# Patient Record
Sex: FEMALE | Race: WHITE | NOT HISPANIC OR LATINO | Employment: OTHER | RURAL
[De-identification: names, ages, dates, MRNs, and addresses within clinical notes are randomized per-mention and may not be internally consistent; named-entity substitution may affect disease eponyms.]

---

## 2017-02-08 ENCOUNTER — TELEPHONE (OUTPATIENT)
Dept: FAMILY MEDICINE CLINIC | Facility: CLINIC | Age: 82
End: 2017-02-08

## 2017-02-08 RX ORDER — ONDANSETRON 4 MG/1
4 TABLET, FILM COATED ORAL EVERY 6 HOURS PRN
Qty: 10 TABLET | Refills: 0 | Status: SHIPPED | OUTPATIENT
Start: 2017-02-08 | End: 2018-01-01

## 2017-02-08 NOTE — TELEPHONE ENCOUNTER
"Per Daughter \"she is throwing up and needs some zofran\"  Verbal per Dr Paniagua ok, but if she needs to be seen to let us know? Spoke with daughter and thinks is just a virus, advised if continues to call for appt, dtr stated understanding  "

## 2017-02-09 DIAGNOSIS — E11.9 TYPE 2 DIABETES MELLITUS WITHOUT COMPLICATION (HCC): ICD-10-CM

## 2017-02-09 RX ORDER — SITAGLIPTIN 100 MG/1
TABLET, FILM COATED ORAL
Qty: 90 TABLET | Refills: 1 | Status: SHIPPED | OUTPATIENT
Start: 2017-02-09 | End: 2017-07-05 | Stop reason: SDUPTHER

## 2017-02-09 RX ORDER — RANITIDINE 150 MG/1
TABLET ORAL
Qty: 180 TABLET | Refills: 1 | Status: SHIPPED | OUTPATIENT
Start: 2017-02-09 | End: 2017-07-05 | Stop reason: SDUPTHER

## 2017-02-09 RX ORDER — AMLODIPINE BESYLATE 5 MG/1
TABLET ORAL
Qty: 180 TABLET | Refills: 1 | Status: SHIPPED | OUTPATIENT
Start: 2017-02-09 | End: 2017-07-05 | Stop reason: SDUPTHER

## 2017-02-09 RX ORDER — METFORMIN HYDROCHLORIDE 500 MG/1
TABLET, EXTENDED RELEASE ORAL
Qty: 180 TABLET | Refills: 1 | Status: SHIPPED | OUTPATIENT
Start: 2017-02-09 | End: 2017-07-05 | Stop reason: SDUPTHER

## 2017-02-09 RX ORDER — POTASSIUM CHLORIDE 750 MG/1
CAPSULE, EXTENDED RELEASE ORAL
Qty: 90 CAPSULE | Refills: 1 | Status: SHIPPED | OUTPATIENT
Start: 2017-02-09 | End: 2017-07-05 | Stop reason: SDUPTHER

## 2017-02-09 RX ORDER — DULOXETIN HYDROCHLORIDE 30 MG/1
CAPSULE, DELAYED RELEASE ORAL
Qty: 90 CAPSULE | Refills: 1 | Status: SHIPPED | OUTPATIENT
Start: 2017-02-09 | End: 2017-07-05 | Stop reason: SDUPTHER

## 2017-02-09 RX ORDER — ATORVASTATIN CALCIUM 10 MG/1
TABLET, FILM COATED ORAL
Qty: 90 TABLET | Refills: 1 | Status: SHIPPED | OUTPATIENT
Start: 2017-02-09 | End: 2017-07-05 | Stop reason: SDUPTHER

## 2017-02-09 RX ORDER — GLIMEPIRIDE 4 MG/1
TABLET ORAL
Qty: 90 TABLET | Refills: 1 | Status: SHIPPED | OUTPATIENT
Start: 2017-02-09 | End: 2017-07-05 | Stop reason: SDUPTHER

## 2017-02-09 RX ORDER — PROPRANOLOL HYDROCHLORIDE 40 MG/1
TABLET ORAL
Qty: 270 TABLET | Refills: 1 | Status: SHIPPED | OUTPATIENT
Start: 2017-02-09 | End: 2017-07-05 | Stop reason: SDUPTHER

## 2017-02-09 RX ORDER — LOSARTAN POTASSIUM 100 MG/1
TABLET ORAL
Qty: 90 TABLET | Refills: 1 | Status: SHIPPED | OUTPATIENT
Start: 2017-02-09 | End: 2017-07-05 | Stop reason: SDUPTHER

## 2017-02-09 RX ORDER — FUROSEMIDE 20 MG/1
TABLET ORAL
Qty: 90 TABLET | Refills: 1 | Status: SHIPPED | OUTPATIENT
Start: 2017-02-09 | End: 2017-07-05 | Stop reason: SDUPTHER

## 2017-02-14 ENCOUNTER — LAB (OUTPATIENT)
Dept: FAMILY MEDICINE CLINIC | Facility: CLINIC | Age: 82
End: 2017-02-14

## 2017-02-14 DIAGNOSIS — I10 ESSENTIAL HYPERTENSION: ICD-10-CM

## 2017-02-14 DIAGNOSIS — E11.9 TYPE 2 DIABETES MELLITUS WITHOUT COMPLICATION, UNSPECIFIED LONG TERM INSULIN USE STATUS: Primary | ICD-10-CM

## 2017-02-14 DIAGNOSIS — E55.9 VITAMIN D DEFICIENCY DISEASE: ICD-10-CM

## 2017-02-14 DIAGNOSIS — E78.5 HYPERLIPIDEMIA, UNSPECIFIED HYPERLIPIDEMIA TYPE: ICD-10-CM

## 2017-02-15 LAB
ALBUMIN SERPL-MCNC: 3.5 G/DL (ref 3.5–5)
ALBUMIN/GLOB SERPL: 1.3 G/DL (ref 1.1–2.5)
ALP SERPL-CCNC: 62 U/L (ref 24–120)
ALT SERPL-CCNC: 36 U/L (ref 0–54)
AST SERPL-CCNC: 22 U/L (ref 7–45)
BASOPHILS # BLD AUTO: 0.02 10*3/MM3 (ref 0–0.2)
BASOPHILS NFR BLD AUTO: 0.2 % (ref 0–2)
BILIRUB SERPL-MCNC: 0.5 MG/DL (ref 0.1–1)
BUN SERPL-MCNC: 14 MG/DL (ref 5–21)
BUN/CREAT SERPL: 22.2 (ref 7–25)
CALCIUM SERPL-MCNC: 9.6 MG/DL (ref 8.4–10.4)
CHLORIDE SERPL-SCNC: 99 MMOL/L (ref 98–110)
CHOLEST SERPL-MCNC: 99 MG/DL (ref 130–200)
CO2 SERPL-SCNC: 30 MMOL/L (ref 24–31)
CREAT SERPL-MCNC: 0.63 MG/DL (ref 0.5–1.4)
EOSINOPHIL # BLD AUTO: 0.15 10*3/MM3 (ref 0–0.7)
EOSINOPHIL NFR BLD AUTO: 1.7 % (ref 0–4)
ERYTHROCYTE [DISTWIDTH] IN BLOOD BY AUTOMATED COUNT: 15 % (ref 12–15)
GLOBULIN SER CALC-MCNC: 2.8 GM/DL
GLUCOSE SERPL-MCNC: 164 MG/DL (ref 70–100)
HBA1C MFR BLD: 6.9 %
HCT VFR BLD AUTO: 40.8 % (ref 37–47)
HDLC SERPL-MCNC: 41 MG/DL
HGB BLD-MCNC: 12.5 G/DL (ref 12–16)
IMM GRANULOCYTES # BLD: 0.03 10*3/MM3 (ref 0–0.03)
IMM GRANULOCYTES NFR BLD: 0.3 % (ref 0–5)
LDLC SERPL CALC-MCNC: 39 MG/DL (ref 0–99)
LYMPHOCYTES # BLD AUTO: 0.9 10*3/MM3 (ref 0.72–4.86)
LYMPHOCYTES NFR BLD AUTO: 10.1 % (ref 15–45)
MCH RBC QN AUTO: 28.6 PG (ref 28–32)
MCHC RBC AUTO-ENTMCNC: 30.6 G/DL (ref 33–36)
MCV RBC AUTO: 93.4 FL (ref 82–98)
MICROALBUMIN UR-MCNC: 23.1 UG/ML
MONOCYTES # BLD AUTO: 0.75 10*3/MM3 (ref 0.19–1.3)
MONOCYTES NFR BLD AUTO: 8.4 % (ref 4–12)
NEUTROPHILS # BLD AUTO: 7.06 10*3/MM3 (ref 1.87–8.4)
NEUTROPHILS NFR BLD AUTO: 79.3 % (ref 39–78)
PLATELET # BLD AUTO: 260 10*3/MM3 (ref 130–400)
POTASSIUM SERPL-SCNC: 4.3 MMOL/L (ref 3.5–5.3)
PROT SERPL-MCNC: 6.3 G/DL (ref 6.3–8.7)
RBC # BLD AUTO: 4.37 10*6/MM3 (ref 4.2–5.4)
SODIUM SERPL-SCNC: 139 MMOL/L (ref 135–145)
T4 SERPL-MCNC: 8.3 UG/DL (ref 4.5–12)
TRIGL SERPL-MCNC: 95 MG/DL (ref 0–149)
TSH SERPL DL<=0.005 MIU/L-ACNC: 1.38 MIU/ML (ref 0.47–4.68)
VLDLC SERPL CALC-MCNC: 19 MG/DL
WBC # BLD AUTO: 8.91 10*3/MM3 (ref 4.8–10.8)

## 2017-02-16 PROBLEM — D64.9 ANEMIA: Status: ACTIVE | Noted: 2017-02-16

## 2017-02-16 PROBLEM — H40.9 GLAUCOMA: Chronic | Status: ACTIVE | Noted: 2017-02-16

## 2017-02-16 PROBLEM — R09.02 HYPOXEMIA: Status: ACTIVE | Noted: 2017-02-16

## 2017-02-16 PROBLEM — E78.5 HYPERLIPIDEMIA: Chronic | Status: ACTIVE | Noted: 2017-02-16

## 2017-02-16 PROBLEM — Z00.00 WELLNESS EXAMINATION: Status: ACTIVE | Noted: 2017-02-16

## 2017-02-16 PROBLEM — H61.20 EXCESSIVE CERUMEN IN EAR CANAL: Status: ACTIVE | Noted: 2017-02-16

## 2017-02-16 PROBLEM — H35.30 MACULAR DEGENERATION: Chronic | Status: ACTIVE | Noted: 2017-02-16

## 2017-02-16 PROBLEM — I10 HYPERTENSION: Chronic | Status: ACTIVE | Noted: 2017-02-16

## 2017-02-16 PROBLEM — F32.A DEPRESSION: Chronic | Status: ACTIVE | Noted: 2017-02-16

## 2017-02-16 PROBLEM — E11.9 DIABETES TYPE 2, CONTROLLED (HCC): Chronic | Status: ACTIVE | Noted: 2017-02-16

## 2017-02-16 PROBLEM — R25.1 TREMOR: Status: ACTIVE | Noted: 2017-02-16

## 2017-02-17 ENCOUNTER — OFFICE VISIT (OUTPATIENT)
Dept: FAMILY MEDICINE CLINIC | Facility: CLINIC | Age: 82
End: 2017-02-17

## 2017-02-17 VITALS
RESPIRATION RATE: 16 BRPM | HEART RATE: 84 BPM | OXYGEN SATURATION: 98 % | SYSTOLIC BLOOD PRESSURE: 128 MMHG | DIASTOLIC BLOOD PRESSURE: 68 MMHG | WEIGHT: 137 LBS | TEMPERATURE: 98.1 F | HEIGHT: 65 IN | BODY MASS INDEX: 22.82 KG/M2

## 2017-02-17 DIAGNOSIS — I10 ESSENTIAL HYPERTENSION: Primary | Chronic | ICD-10-CM

## 2017-02-17 DIAGNOSIS — E11.9 CONTROLLED TYPE 2 DIABETES MELLITUS WITHOUT COMPLICATION, WITHOUT LONG-TERM CURRENT USE OF INSULIN (HCC): Chronic | ICD-10-CM

## 2017-02-17 DIAGNOSIS — R26.9 GAIT DIFFICULTY: ICD-10-CM

## 2017-02-17 DIAGNOSIS — E78.5 HYPERLIPIDEMIA, UNSPECIFIED HYPERLIPIDEMIA TYPE: Chronic | ICD-10-CM

## 2017-02-17 DIAGNOSIS — D64.9 ANEMIA, UNSPECIFIED TYPE: ICD-10-CM

## 2017-02-17 DIAGNOSIS — R09.02 HYPOXEMIA: ICD-10-CM

## 2017-02-17 PROBLEM — I49.9 CARDIAC ARRHYTHMIA: Status: ACTIVE | Noted: 2017-02-17

## 2017-02-17 PROCEDURE — 99213 OFFICE O/P EST LOW 20 MIN: CPT | Performed by: FAMILY MEDICINE

## 2017-02-17 RX ORDER — DOCUSATE SODIUM 100 MG/1
100 CAPSULE, LIQUID FILLED ORAL 2 TIMES DAILY PRN
COMMUNITY

## 2017-02-17 RX ORDER — ASPIRIN 81 MG/1
81 TABLET ORAL DAILY
COMMUNITY
End: 2019-01-01 | Stop reason: ALTCHOICE

## 2017-02-17 NOTE — PROGRESS NOTES
Subjective   Kortney Henson is a 89 y.o. female presenting with chief complaint of:   Chief Complaint   Patient presents with   • Diabetes   • Hyperlipidemia   • Hypertension   • Anemia       History of Present Illness :  With daughter.  Has multiple chronic problems; interval appointment.  One of these problems is HTN.       The HTN has been present for years/it is chronic.  The HTN is assumed essential/without testing needed to look for other.  The HTN is controlled manifest by todays blood pressure and no home monitoring.   Other chronic problem/s to consider:  Anemia: This has been present for years/over a year.  It is chronic.  These has been GI evaulation in the past. There is no current melena, hematochezia. It has been bening to date and stable.   DM2: This has been present for years/over a year.  It is chronic.  It is controlled.  Home accuchecks rarely run.  No hypoglycemia manifest as syncope/near syncope or diaphoretic/sweating episodes.  A1c below.   Gait decline: This has been present for years/over a year.  It is chronic.  It is influenced by strength, balance, minor joint pain.   Hyperlipidemia: This has been present for years/over a year.  It is chronic.   It is controlled.   Labs are done regularly and prove control  Nocturnal hypoxemia:  Was discovered with her hip fracture/over a year and chronic. She wears her oxygen at night.  She denies SOB night/day.  She does not want a sleep study.     The following portions of the patient's history were reviewed and updated as appropriate: allergies, current medications, past family history, past medical history, past social history, past surgical history and problem list.  TCC migrated if needed/reviewed.      Current Outpatient Prescriptions:   •  amLODIPine (NORVASC) 5 MG tablet, TAKE ONE TABLET TWICE DAILY GENERIC FOR NORVASC, Disp: 180 tablet, Rfl: 1  •  aspirin 81 MG EC tablet, Take 81 mg by mouth Daily., Disp: , Rfl:   •  atorvastatin (LIPITOR) 10 MG  tablet, TAKE ONE TABLET AT BEDTIME GENERIC FOR LIPITOR, Disp: 90 tablet, Rfl: 1  •  Calcium Carb-Cholecalciferol (CALCIUM-VITAMIN D) 600-400 MG-UNIT tablet, Take  by mouth 2 (Two) Times a Day., Disp: , Rfl:   •  docusate sodium (COLACE) 100 MG capsule, Take 100 mg by mouth 2 (Two) Times a Day As Needed for constipation., Disp: , Rfl:   •  DULoxetine (CYMBALTA) 30 MG capsule, TAKE 1 CAPSULE DAILY GENERIC FOR CYMBALTA, Disp: 90 capsule, Rfl: 1  •  furosemide (LASIX) 20 MG tablet, TAKE 1 TABLET DAILY GENERIC FOR LASIX, Disp: 90 tablet, Rfl: 1  •  glimepiride (AMARYL) 4 MG tablet, TAKE ONE TABLET EVERY MORNING GENERIC FOR AMARYL, Disp: 90 tablet, Rfl: 1  •  glucose blood (COOL BLOOD GLUCOSE TEST STRIPS) test strip, USE AS DIRECTED DAILY, Disp: 100 each, Rfl: 5  •  JANUVIA 100 MG tablet, TAKE ONE TABLET DAILY, Disp: 90 tablet, Rfl: 1  •  losartan (COZAAR) 100 MG tablet, TAKE ONE TABLET DAILY GENERIC FOR COZAAR, Disp: 90 tablet, Rfl: 1  •  metFORMIN ER (GLUCOPHAGE-XR) 500 MG 24 hr tablet, TAKE  2 TABLETS DAILY WITH EVENING MEAL GENERIC FOR GLUCOPHAGE ER( DOSE IN  REHAB CENTER), Disp: 180 tablet, Rfl: 1  •  Multiple Vitamins-Minerals (WOMENS MULTI VITAMIN & MINERAL) tablet, Take  by mouth Daily., Disp: , Rfl:   •  potassium chloride (MICRO-K) 10 MEQ CR capsule, TAKE 1 CAPSULE DAILY (WITH LASIX), Disp: 90 capsule, Rfl: 1  •  propranolol (INDERAL) 40 MG tablet, TAKE ONE TABLET THREE TIMES DAILY GENERIC FOR INDERAL, Disp: 270 tablet, Rfl: 1  •  raNITIdine (ZANTAC) 150 MG tablet, TAKE ONE TABLET TWICE DAILY GENERIC FOR ZANTAC, Disp: 180 tablet, Rfl: 1  •  teriparatide (FORTEO) 600 MCG/2.4ML injection, Inject 20 mcg under the skin Daily. Dr Magana, Disp: , Rfl:   •  ondansetron (ZOFRAN) 4 MG tablet, Take 1 tablet by mouth Every 6 (Six) Hours As Needed for nausea or vomiting., Disp: 10 tablet, Rfl: 0    Allergies   Allergen Reactions   • Penicillins Swelling and Rash       Review of Systems  GENERAL:  Inactive/slower with  limits, speed, samni for age and balance.. Sleep is ok with oxygen and denies apnea. No fever.  ENDO:  No syncope, near or diaphoretic sweaty spells.  HEENT: No head injury  headache,  No vision change, No hearing loss.  Ears without pain/drainage.  No sore throat.  No nasal/sinus congestion/drainage. No epistaxis.  CHEST: No chest wall tenderness or mass. No cough,  without wheeze, SOB; no hemoptysis.  CV: No chest pain, palpatations, ankle edema.  GI: No heartburn, dysphagia.  No abdominal pain, diarrhea, constipation, rectal bleeding, or melena.    :  Voids without dysuria, or incontience to completion.  ORTHO: No painful/swollen joints but various on /off sore.  No  sore neck or back.  No acute neck or back pain without recent injury.   NEURO: No dizziness, weakness of extremities.  No numbness/parethesias.   PSYCH: No memory loss.  Mood good; not anxious, depressed but/and not suicidal.  Tolerated stress.     Results for orders placed or performed in visit on 02/14/17   Comprehensive metabolic panel   Result Value Ref Range    Glucose 164 (H) 70 - 100 mg/dL    BUN 14 5 - 21 mg/dL    Creatinine 0.63 0.50 - 1.40 mg/dL    eGFR Non African Am 89 >60 mL/min/1.73    eGFR African Am 108 >60 mL/min/1.73    BUN/Creatinine Ratio 22.2 7.0 - 25.0    Sodium 139 135 - 145 mmol/L    Potassium 4.3 3.5 - 5.3 mmol/L    Chloride 99 98 - 110 mmol/L    Total CO2 30.0 24.0 - 31.0 mmol/L    Calcium 9.6 8.4 - 10.4 mg/dL    Total Protein 6.3 6.3 - 8.7 g/dL    Albumin 3.50 3.50 - 5.00 g/dL    Globulin 2.8 gm/dL    A/G Ratio 1.3 1.1 - 2.5 g/dL    Total Bilirubin 0.5 0.1 - 1.0 mg/dL    Alkaline Phosphatase 62 24 - 120 U/L    AST (SGOT) 22 7 - 45 U/L    ALT (SGPT) 36 0 - 54 U/L   Lipid Panel   Result Value Ref Range    Total Cholesterol 99 (L) 130 - 200 mg/dL    Triglycerides 95 0 - 149 mg/dL    HDL Cholesterol 41 (L) >=50 mg/dL    VLDL Cholesterol 19 mg/dL    LDL Cholesterol  39 0 - 99 mg/dL   TSH   Result Value Ref Range    TSH 1.380  0.470 - 4.680 mIU/mL   T4   Result Value Ref Range    T4, Total 8.3 4.5 - 12.0 ug/dL   Hemoglobin A1c   Result Value Ref Range    Hemoglobin A1C 6.90 %   MicroAlbumin, Urine, Random   Result Value Ref Range    Microalbumin, Urine 23.1 Not Estab. ug/mL   CBC & Differential   Result Value Ref Range    WBC 8.91 4.80 - 10.80 10*3/mm3    RBC 4.37 4.20 - 5.40 10*6/mm3    Hemoglobin 12.5 12.0 - 16.0 g/dL    Hematocrit 40.8 37.0 - 47.0 %    MCV 93.4 82.0 - 98.0 fL    MCH 28.6 28.0 - 32.0 pg    MCHC 30.6 (L) 33.0 - 36.0 g/dL    RDW 15.0 12.0 - 15.0 %    Platelets 260 130 - 400 10*3/mm3    Neutrophil Rel % 79.3 (H) 39.0 - 78.0 %    Lymphocyte Rel % 10.1 (L) 15.0 - 45.0 %    Monocyte Rel % 8.4 4.0 - 12.0 %    Eosinophil Rel % 1.7 0.0 - 4.0 %    Basophil Rel % 0.2 0.0 - 2.0 %    Neutrophils Absolute 7.06 1.87 - 8.40 10*3/mm3    Lymphocytes Absolute 0.90 0.72 - 4.86 10*3/mm3    Monocytes Absolute 0.75 0.19 - 1.30 10*3/mm3    Eosinophils Absolute 0.15 0.00 - 0.70 10*3/mm3    Basophils Absolute 0.02 0.00 - 0.20 10*3/mm3    Immature Granulocyte Rel % 0.3 0.0 - 5.0 %    Immature Grans Absolute 0.03 0.00 - 0.03 10*3/mm3       Lab Results   Component Value Date    HGBA1C 6.90 02/14/2017       Lab Results   Component Value Date     02/14/2017     02/21/2016     02/20/2016    K 4.3 02/14/2017    K 3.7 02/21/2016    K 3.6 02/20/2016    CL 99 02/14/2017     02/21/2016     02/20/2016    CO2 30.0 02/14/2017    CO2 22 (L) 02/21/2016    CO2 25 02/20/2016    GLUCOSE 131 (H) 02/21/2016    GLUCOSE 204 (H) 02/20/2016    BUN 14 02/14/2017    BUN 22 (H) 02/21/2016    BUN 24 (H) 02/20/2016    CREATININE 0.63 02/14/2017    CREATININE 0.54 02/21/2016    CREATININE 0.51 02/20/2016    CALCIUM 9.6 02/14/2017    CALCIUM 8.2 (L) 02/21/2016    CALCIUM 8.9 02/20/2016    EGFRCLEARA 107 02/21/2016    EGFRCLEARA 114 02/20/2016       No results found for: PSA     Objective   Visit Vitals   • /68 (BP Location: Left arm, Patient  "Position: Sitting, Cuff Size: Adult)   • Pulse 84   • Temp 98.1 °F (36.7 °C) (Oral)   • Resp 16   • Ht 64.5\" (163.8 cm)   • Wt 137 lb (62.1 kg)   • SpO2 98%   • BMI 23.15 kg/m2       Physical Exam  GENERAL:  Well nourished/developed in no acute distress. Thin  SKIN: Turgor excellent, without wound, rash, lesion.  HEENT: Normal cephalic without trauma.  Pupils equal round reactive to light. Extraocular motions full without nystagmus.   External canals nonobstructive nontender without reddness. Tymphatic membranes elza with cristina structures intact.   Oral cavity without growths, exudates, and moist.  Posterior pharnyx without mass, obstruction, reddness.  No thyroidmegaly, mass, tenderness, lymphadenopathy and supple.  CV: Regular rhythm.  No murmur, gallop,  edema. Posterior pulses intact.  No carotid bruits.  CHEST: No chest wall tenderness or mass.   LUNGS: Symmetric motion with clear to auscultation.   ABD: Soft, nontender without mass.   ORTHO: Symmetric extremities without swelling/point tenderness.  Full gross range of motion.    NEURO: CN 2-12 grossly intact.  Symmetric facies. 1/4 x knee  equal reflexes.  UE/LE   2-3/5 strength throughout.  Nonfocal use extremities. Speech clear.  Reduced light touch with monofilament, vibratory sensation with tuning fork; equal toes/distal feet.    PSYCH: Oriented x 3.  Pleasant calm, well kept.  Purposeful/directed conservation with intact short/long gross memory.     Assessment/Plan     1. Essential hypertension    2. Controlled type 2 diabetes mellitus without complication, without long-term current use of insulin    3. Hyperlipidemia, unspecified hyperlipidemia type    4. Anemia, unspecified type  In past    5. Gait difficulty  Mostly degenerative    6. Hypoxemia  nocturnal      Rx: reviewed.  Changes if above  LAB: reviewed/above, and if orders  Wrap-up/other instructions  Regular cardio exercise something everyone should consider and try to do; discussed as " able  Healthy diet helpful for weight management, illness prevention; discussed  If over 50-screening exams include men PSA/rectal exam, women mammograms, and  everyone colonoscopy screening for colon cancer.   There are no Patient Instructions on file for this visit.    Follow up: Return in about 6 months (around 8/17/2017).

## 2017-03-07 ENCOUNTER — OFFICE VISIT (OUTPATIENT)
Dept: FAMILY MEDICINE CLINIC | Facility: CLINIC | Age: 82
End: 2017-03-07

## 2017-03-07 VITALS
DIASTOLIC BLOOD PRESSURE: 66 MMHG | OXYGEN SATURATION: 96 % | TEMPERATURE: 98.2 F | SYSTOLIC BLOOD PRESSURE: 128 MMHG | BODY MASS INDEX: 22.99 KG/M2 | HEIGHT: 65 IN | RESPIRATION RATE: 18 BRPM | HEART RATE: 88 BPM | WEIGHT: 138 LBS

## 2017-03-07 DIAGNOSIS — R09.02 HYPOXEMIA: Primary | ICD-10-CM

## 2017-03-07 PROCEDURE — 99213 OFFICE O/P EST LOW 20 MIN: CPT | Performed by: FAMILY MEDICINE

## 2017-03-07 NOTE — PROGRESS NOTES
"Subjective   Kortney Henson is a 89 y.o. female presenting with chief complaint of:   Chief Complaint   Patient presents with   • oxygen therapy     face to face \"I use it at bedtime\"       History of Present Illness :  With daughter.  Has multiple chronic problems; interval appointment.  One of these problems is nocturnal hypoxemia.  Discovered while in hospital 2.2016 with hip fracture.  Treated with nocturnal oxygen.  Denies SOB at night.  Needs face to face to continue care.      Other chronic problem/s to consider:  HTN.  The HTN has been present for years/it is chronic.  The HTN is assumed essential/without testing needed to look for other.  The HTN is controlled manifest by todays blood pressure and same/occ home monitoring.   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  The arrthymia is primarily PAC/PVC benign on holter.   The rhythm is not associated with syncope/near syncope, dizziness, or weakness. Medications being used help.  DM2: This has been present for years/over a year.  It is chronic.  It is controlled.  Home accuchecks run fasting 100-120.   No hypoglycemia manifest as syncope/near syncope or diaphoretic/sweating episodes.  A1c below.   Hyperlipidemia: This has been present for years/over a year.  It is chronic.   It is controlled.   Labs are done regularly and prove contro    The following portions of the patient's history were reviewed and updated as appropriate: allergies, current medications, past family history, past medical history, past social history, past surgical history and problem list.  TCC migrated if needed/reviewed.      Current Outpatient Prescriptions:   •  amLODIPine (NORVASC) 5 MG tablet, TAKE ONE TABLET TWICE DAILY GENERIC FOR NORVASC, Disp: 180 tablet, Rfl: 1  •  aspirin 81 MG EC tablet, Take 81 mg by mouth Daily., Disp: , Rfl:   •  atorvastatin (LIPITOR) 10 MG tablet, TAKE ONE TABLET AT BEDTIME GENERIC FOR LIPITOR, Disp: 90 tablet, Rfl: " 1  •  Calcium Carb-Cholecalciferol (CALCIUM-VITAMIN D) 600-400 MG-UNIT tablet, Take  by mouth 2 (Two) Times a Day., Disp: , Rfl:   •  docusate sodium (COLACE) 100 MG capsule, Take 100 mg by mouth 2 (Two) Times a Day As Needed for constipation., Disp: , Rfl:   •  DULoxetine (CYMBALTA) 30 MG capsule, TAKE 1 CAPSULE DAILY GENERIC FOR CYMBALTA, Disp: 90 capsule, Rfl: 1  •  furosemide (LASIX) 20 MG tablet, TAKE 1 TABLET DAILY GENERIC FOR LASIX, Disp: 90 tablet, Rfl: 1  •  glimepiride (AMARYL) 4 MG tablet, TAKE ONE TABLET EVERY MORNING GENERIC FOR AMARYL, Disp: 90 tablet, Rfl: 1  •  glucose blood (COOL BLOOD GLUCOSE TEST STRIPS) test strip, USE AS DIRECTED DAILY, Disp: 100 each, Rfl: 5  •  JANUVIA 100 MG tablet, TAKE ONE TABLET DAILY, Disp: 90 tablet, Rfl: 1  •  losartan (COZAAR) 100 MG tablet, TAKE ONE TABLET DAILY GENERIC FOR COZAAR, Disp: 90 tablet, Rfl: 1  •  metFORMIN ER (GLUCOPHAGE-XR) 500 MG 24 hr tablet, TAKE  2 TABLETS DAILY WITH EVENING MEAL GENERIC FOR GLUCOPHAGE ER( DOSE IN  REHAB CENTER), Disp: 180 tablet, Rfl: 1  •  Multiple Vitamins-Minerals (WOMENS MULTI VITAMIN & MINERAL) tablet, Take  by mouth Daily., Disp: , Rfl:   •  O2 (OXYGEN), Inhale 2 L/min Every Night. Per nasal cannula, Disp: , Rfl:   •  ondansetron (ZOFRAN) 4 MG tablet, Take 1 tablet by mouth Every 6 (Six) Hours As Needed for nausea or vomiting., Disp: 10 tablet, Rfl: 0  •  potassium chloride (MICRO-K) 10 MEQ CR capsule, TAKE 1 CAPSULE DAILY (WITH LASIX), Disp: 90 capsule, Rfl: 1  •  propranolol (INDERAL) 40 MG tablet, TAKE ONE TABLET THREE TIMES DAILY GENERIC FOR INDERAL, Disp: 270 tablet, Rfl: 1  •  raNITIdine (ZANTAC) 150 MG tablet, TAKE ONE TABLET TWICE DAILY GENERIC FOR ZANTAC, Disp: 180 tablet, Rfl: 1  •  teriparatide (FORTEO) 600 MCG/2.4ML injection, Inject 20 mcg under the skin Daily. Dr Magana, Disp: , Rfl:     Allergies   Allergen Reactions   • Penicillins Swelling and Rash       Review of Systems  GENERAL:  Inactive/slower with  limits, speed, samni for age fatigue with exertion; desire. Sleep is ok.  ENDO:  No syncope, near or diaphoretic sweaty spells.  BS Ok: no download noted.  HEENT: No head injury or headache,  No vision change, Same mild hearing loss.  Ears without pain/drainage.  No sore throat.  No nasal/sinus congestion/drainage. No epistaxis.  CHEST: No chest wall tenderness or mass. No cough,  without wheeze,occ with exertional stable/mild SOB; no hemoptysis.  CV: No chest pain, palpatations, ankle edema.  GI: No heartburn, dysphagia.  No abdominal pain, diarrhea, constipation, rectal bleeding, or melena.    :  Voids without dysuria, or incontience to completion.  ORTHO: No painful/swollen joints but various on /off sore.  No change occ sore neck or back.  No acute neck or back pain without recent injury.   NEURO: No dizziness, weakness of extremities.  No change LE numbness/parethesias.   PSYCH: No memory loss.  Mood good; mild occ anxious, depressed but/and not suicidal.  Tolerated stress.     Results for orders placed or performed in visit on 02/14/17   Comprehensive metabolic panel   Result Value Ref Range    Glucose 164 (H) 70 - 100 mg/dL    BUN 14 5 - 21 mg/dL    Creatinine 0.63 0.50 - 1.40 mg/dL    eGFR Non African Am 89 >60 mL/min/1.73    eGFR African Am 108 >60 mL/min/1.73    BUN/Creatinine Ratio 22.2 7.0 - 25.0    Sodium 139 135 - 145 mmol/L    Potassium 4.3 3.5 - 5.3 mmol/L    Chloride 99 98 - 110 mmol/L    Total CO2 30.0 24.0 - 31.0 mmol/L    Calcium 9.6 8.4 - 10.4 mg/dL    Total Protein 6.3 6.3 - 8.7 g/dL    Albumin 3.50 3.50 - 5.00 g/dL    Globulin 2.8 gm/dL    A/G Ratio 1.3 1.1 - 2.5 g/dL    Total Bilirubin 0.5 0.1 - 1.0 mg/dL    Alkaline Phosphatase 62 24 - 120 U/L    AST (SGOT) 22 7 - 45 U/L    ALT (SGPT) 36 0 - 54 U/L   Lipid Panel   Result Value Ref Range    Total Cholesterol 99 (L) 130 - 200 mg/dL    Triglycerides 95 0 - 149 mg/dL    HDL Cholesterol 41 (L) >=50 mg/dL    VLDL Cholesterol 19 mg/dL    LDL  Cholesterol  39 0 - 99 mg/dL   TSH   Result Value Ref Range    TSH 1.380 0.470 - 4.680 mIU/mL   T4   Result Value Ref Range    T4, Total 8.3 4.5 - 12.0 ug/dL   Hemoglobin A1c   Result Value Ref Range    Hemoglobin A1C 6.90 %   MicroAlbumin, Urine, Random   Result Value Ref Range    Microalbumin, Urine 23.1 Not Estab. ug/mL   CBC & Differential   Result Value Ref Range    WBC 8.91 4.80 - 10.80 10*3/mm3    RBC 4.37 4.20 - 5.40 10*6/mm3    Hemoglobin 12.5 12.0 - 16.0 g/dL    Hematocrit 40.8 37.0 - 47.0 %    MCV 93.4 82.0 - 98.0 fL    MCH 28.6 28.0 - 32.0 pg    MCHC 30.6 (L) 33.0 - 36.0 g/dL    RDW 15.0 12.0 - 15.0 %    Platelets 260 130 - 400 10*3/mm3    Neutrophil Rel % 79.3 (H) 39.0 - 78.0 %    Lymphocyte Rel % 10.1 (L) 15.0 - 45.0 %    Monocyte Rel % 8.4 4.0 - 12.0 %    Eosinophil Rel % 1.7 0.0 - 4.0 %    Basophil Rel % 0.2 0.0 - 2.0 %    Neutrophils Absolute 7.06 1.87 - 8.40 10*3/mm3    Lymphocytes Absolute 0.90 0.72 - 4.86 10*3/mm3    Monocytes Absolute 0.75 0.19 - 1.30 10*3/mm3    Eosinophils Absolute 0.15 0.00 - 0.70 10*3/mm3    Basophils Absolute 0.02 0.00 - 0.20 10*3/mm3    Immature Granulocyte Rel % 0.3 0.0 - 5.0 %    Immature Grans Absolute 0.03 0.00 - 0.03 10*3/mm3       CBC:     BMP:      Lab Results   Component Value Date    HGBA1C 6.90 02/14/2017       Lab Results   Component Value Date     02/14/2017     02/21/2016     02/20/2016    K 4.3 02/14/2017    K 3.7 02/21/2016    K 3.6 02/20/2016    CL 99 02/14/2017     02/21/2016     02/20/2016    CO2 30.0 02/14/2017    CO2 22 (L) 02/21/2016    CO2 25 02/20/2016    GLUCOSE 131 (H) 02/21/2016    GLUCOSE 204 (H) 02/20/2016    BUN 14 02/14/2017    BUN 22 (H) 02/21/2016    BUN 24 (H) 02/20/2016    CREATININE 0.63 02/14/2017    CREATININE 0.54 02/21/2016    CREATININE 0.51 02/20/2016    CALCIUM 9.6 02/14/2017    CALCIUM 8.2 (L) 02/21/2016    CALCIUM 8.9 02/20/2016    EGFRCLEARA 107 02/21/2016    EGFRCLEARA 114 02/20/2016       No  "results found for: PSA     Objective   Visit Vitals   • /66 (BP Location: Left arm, Patient Position: Sitting, Cuff Size: Adult)   • Pulse 88   • Temp 98.2 °F (36.8 °C) (Oral)   • Resp 18   • Ht 64.5\" (163.8 cm)   • Wt 138 lb (62.6 kg)   • SpO2 96%   • BMI 23.32 kg/m2       Physical Exam  GENERAL:  Well nourished/developed in no acute distress. Thin  SKIN: Turgor excellent, without wound, rash, lesion.  HEENT: Normal cephalic without trauma.  Pupils equal round reactive to light. Extraocular motions full without nystagmus.   External canals nonobstructive nontender without reddness. Tymphatic membranes elza with cristina structures intact.   Oral cavity without growths, exudates, and moist.  Posterior pharnyx without mass, obstruction, reddness.  No thyroidmegaly, mass, tenderness, lymphadenopathy and supple.  CV: Regular rhythm.  No murmur, gallop,  edema. Posterior pulses intact.  No carotid bruits.  CHEST: No chest wall tenderness or mass. Mild kyphosis.   LUNGS: Symmetric motion with clear to auscultation.   ABD: Soft, nontender without mass.   ORTHO: Symmetric extremities without swelling/point tenderness.  Full gross range of motion.   With rolling walker  NEURO: CN 2-12 grossly intact.  Symmetric facies. 1/4 x bicep knee equal reflexes.  UE/LE   3/5 strength throughout.  Nonfocal use extremities. Speech clear.    PSYCH: Oriented x 3.  Pleasant calm, well kept.  Purposeful/directed conservation with intact short/long gross memory.     Assessment/Plan     1. Hypoxemia  Noctural; she does not want further evaluation for this; other health issues with age/situation  Face-face visit for oxygen therapy    Rx: reviewed.  Same-including continuing her oxygen as ordered   LAB: reviewed/above; same 6/12    There are no Patient Instructions on file for this visit.    Follow up: Return for 8.18.17 as planned.  "

## 2017-07-05 RX ORDER — RANITIDINE 150 MG/1
TABLET ORAL
Qty: 180 TABLET | Refills: 1 | Status: SHIPPED | OUTPATIENT
Start: 2017-07-05 | End: 2018-01-19 | Stop reason: SDUPTHER

## 2017-07-05 RX ORDER — FUROSEMIDE 20 MG/1
TABLET ORAL
Qty: 90 TABLET | Refills: 1 | Status: SHIPPED | OUTPATIENT
Start: 2017-07-05 | End: 2018-01-19 | Stop reason: SDUPTHER

## 2017-07-05 RX ORDER — ATORVASTATIN CALCIUM 10 MG/1
TABLET, FILM COATED ORAL
Qty: 90 TABLET | Refills: 1 | Status: SHIPPED | OUTPATIENT
Start: 2017-07-05 | End: 2018-01-19 | Stop reason: SDUPTHER

## 2017-07-05 RX ORDER — DULOXETIN HYDROCHLORIDE 30 MG/1
CAPSULE, DELAYED RELEASE ORAL
Qty: 90 CAPSULE | Refills: 1 | Status: SHIPPED | OUTPATIENT
Start: 2017-07-05 | End: 2018-01-19 | Stop reason: SDUPTHER

## 2017-07-05 RX ORDER — LOSARTAN POTASSIUM 100 MG/1
TABLET ORAL
Qty: 90 TABLET | Refills: 1 | Status: SHIPPED | OUTPATIENT
Start: 2017-07-05 | End: 2018-01-19 | Stop reason: SDUPTHER

## 2017-07-05 RX ORDER — PROPRANOLOL HYDROCHLORIDE 40 MG/1
TABLET ORAL
Qty: 270 TABLET | Refills: 1 | Status: SHIPPED | OUTPATIENT
Start: 2017-07-05 | End: 2018-01-19 | Stop reason: SDUPTHER

## 2017-07-05 RX ORDER — POTASSIUM CHLORIDE 750 MG/1
CAPSULE, EXTENDED RELEASE ORAL
Qty: 90 CAPSULE | Refills: 1 | Status: SHIPPED | OUTPATIENT
Start: 2017-07-05 | End: 2018-01-19 | Stop reason: SDUPTHER

## 2017-07-05 RX ORDER — SITAGLIPTIN 100 MG/1
TABLET, FILM COATED ORAL
Qty: 90 TABLET | Refills: 1 | Status: SHIPPED | OUTPATIENT
Start: 2017-07-05 | End: 2018-01-19 | Stop reason: SDUPTHER

## 2017-07-05 RX ORDER — AMLODIPINE BESYLATE 5 MG/1
TABLET ORAL
Qty: 180 TABLET | Refills: 1 | Status: SHIPPED | OUTPATIENT
Start: 2017-07-05 | End: 2018-01-19 | Stop reason: SDUPTHER

## 2017-07-05 RX ORDER — METFORMIN HYDROCHLORIDE 500 MG/1
TABLET, EXTENDED RELEASE ORAL
Qty: 180 TABLET | Refills: 1 | Status: SHIPPED | OUTPATIENT
Start: 2017-07-05 | End: 2018-01-19 | Stop reason: SDUPTHER

## 2017-07-05 RX ORDER — GLIMEPIRIDE 4 MG/1
TABLET ORAL
Qty: 90 TABLET | Refills: 1 | Status: SHIPPED | OUTPATIENT
Start: 2017-07-05 | End: 2018-01-19 | Stop reason: SDUPTHER

## 2017-08-18 ENCOUNTER — OFFICE VISIT (OUTPATIENT)
Dept: FAMILY MEDICINE CLINIC | Facility: CLINIC | Age: 82
End: 2017-08-18

## 2017-08-18 VITALS
RESPIRATION RATE: 16 BRPM | HEIGHT: 65 IN | BODY MASS INDEX: 22.99 KG/M2 | HEART RATE: 78 BPM | DIASTOLIC BLOOD PRESSURE: 60 MMHG | WEIGHT: 138 LBS | SYSTOLIC BLOOD PRESSURE: 128 MMHG | OXYGEN SATURATION: 97 % | TEMPERATURE: 97.8 F

## 2017-08-18 DIAGNOSIS — E78.5 HYPERLIPIDEMIA, UNSPECIFIED HYPERLIPIDEMIA TYPE: Chronic | ICD-10-CM

## 2017-08-18 DIAGNOSIS — F32.A DEPRESSION, UNSPECIFIED DEPRESSION TYPE: Chronic | ICD-10-CM

## 2017-08-18 DIAGNOSIS — R26.9 GAIT DIFFICULTY: ICD-10-CM

## 2017-08-18 DIAGNOSIS — I10 ESSENTIAL HYPERTENSION: Primary | Chronic | ICD-10-CM

## 2017-08-18 DIAGNOSIS — D64.9 ANEMIA, UNSPECIFIED TYPE: ICD-10-CM

## 2017-08-18 DIAGNOSIS — E11.9 CONTROLLED TYPE 2 DIABETES MELLITUS WITHOUT COMPLICATION, WITHOUT LONG-TERM CURRENT USE OF INSULIN (HCC): Chronic | ICD-10-CM

## 2017-08-18 PROBLEM — R32 URINARY INCONTINENCE: Status: ACTIVE | Noted: 2017-08-18

## 2017-08-18 PROBLEM — M81.0 OSTEOPOROSIS: Status: ACTIVE | Noted: 2017-08-18

## 2017-08-18 PROCEDURE — 99213 OFFICE O/P EST LOW 20 MIN: CPT | Performed by: FAMILY MEDICINE

## 2017-08-19 LAB
ALBUMIN SERPL-MCNC: 4.3 G/DL (ref 3.5–5)
ALBUMIN/GLOB SERPL: 1.5 G/DL (ref 1.1–2.5)
ALP SERPL-CCNC: 67 U/L (ref 24–120)
ALT SERPL-CCNC: 40 U/L (ref 0–54)
AST SERPL-CCNC: 26 U/L (ref 7–45)
BASOPHILS # BLD AUTO: 0.03 10*3/MM3 (ref 0–0.2)
BASOPHILS NFR BLD AUTO: 0.4 % (ref 0–2)
BILIRUB SERPL-MCNC: 0.7 MG/DL (ref 0.1–1)
BUN SERPL-MCNC: 21 MG/DL (ref 5–21)
BUN/CREAT SERPL: 29.6 (ref 7–25)
CALCIUM SERPL-MCNC: 10 MG/DL (ref 8.4–10.4)
CHLORIDE SERPL-SCNC: 103 MMOL/L (ref 98–110)
CO2 SERPL-SCNC: 26 MMOL/L (ref 24–31)
CREAT SERPL-MCNC: 0.71 MG/DL (ref 0.5–1.4)
EOSINOPHIL # BLD AUTO: 0.15 10*3/MM3 (ref 0–0.7)
EOSINOPHIL NFR BLD AUTO: 2.1 % (ref 0–4)
ERYTHROCYTE [DISTWIDTH] IN BLOOD BY AUTOMATED COUNT: 14.4 % (ref 12–15)
GLOBULIN SER CALC-MCNC: 2.8 GM/DL
GLUCOSE SERPL-MCNC: 230 MG/DL (ref 70–100)
HBA1C MFR BLD: 7.2 %
HCT VFR BLD AUTO: 39.3 % (ref 37–47)
HGB BLD-MCNC: 12.3 G/DL (ref 12–16)
IMM GRANULOCYTES # BLD: 0.01 10*3/MM3 (ref 0–0.03)
IMM GRANULOCYTES NFR BLD: 0.1 % (ref 0–5)
LYMPHOCYTES # BLD AUTO: 1.11 10*3/MM3 (ref 0.72–4.86)
LYMPHOCYTES NFR BLD AUTO: 15.6 % (ref 15–45)
MCH RBC QN AUTO: 29.5 PG (ref 28–32)
MCHC RBC AUTO-ENTMCNC: 31.3 G/DL (ref 33–36)
MCV RBC AUTO: 94.2 FL (ref 82–98)
MONOCYTES # BLD AUTO: 0.68 10*3/MM3 (ref 0.19–1.3)
MONOCYTES NFR BLD AUTO: 9.5 % (ref 4–12)
NEUTROPHILS # BLD AUTO: 5.15 10*3/MM3 (ref 1.87–8.4)
NEUTROPHILS NFR BLD AUTO: 72.3 % (ref 39–78)
PLATELET # BLD AUTO: 256 10*3/MM3 (ref 130–400)
POTASSIUM SERPL-SCNC: 4.4 MMOL/L (ref 3.5–5.3)
PROT SERPL-MCNC: 7.1 G/DL (ref 6.3–8.7)
RBC # BLD AUTO: 4.17 10*6/MM3 (ref 4.2–5.4)
SODIUM SERPL-SCNC: 142 MMOL/L (ref 135–145)
WBC # BLD AUTO: 7.13 10*3/MM3 (ref 4.8–10.8)

## 2017-08-21 PROBLEM — Z01.89 LABORATORY TEST: Status: ACTIVE | Noted: 2017-08-21

## 2017-08-29 ENCOUNTER — TELEPHONE (OUTPATIENT)
Dept: FAMILY MEDICINE CLINIC | Facility: CLINIC | Age: 82
End: 2017-08-29

## 2017-08-29 RX ORDER — METRONIDAZOLE 250 MG/1
250 TABLET ORAL 3 TIMES DAILY
Qty: 21 TABLET | Refills: 0 | Status: SHIPPED | OUTPATIENT
Start: 2017-08-29 | End: 2017-10-27 | Stop reason: SDUPTHER

## 2017-08-29 NOTE — TELEPHONE ENCOUNTER
Recent antibiotics from somewhere?   Blood in stool?   Abdominal pain?   Fever?   New Rx from somewhere?

## 2017-08-29 NOTE — TELEPHONE ENCOUNTER
Pt's dtr came in and filled out nurse communication form and states that pt has had explosive diarrhea x 3 days dtr states she has had diarrhea x 3 today and x 4 Sun and Monday has chrystal giving Imodium and pushing fluids and not really helping and wants to know about [place] states she is getting weaker Vy REEVES

## 2017-10-27 RX ORDER — METRONIDAZOLE 250 MG/1
250 TABLET ORAL 3 TIMES DAILY
Qty: 21 TABLET | Refills: 0 | Status: SHIPPED | OUTPATIENT
Start: 2017-10-27 | End: 2018-01-01

## 2018-01-01 ENCOUNTER — APPOINTMENT (OUTPATIENT)
Dept: GENERAL RADIOLOGY | Facility: HOSPITAL | Age: 83
End: 2018-01-01

## 2018-01-01 ENCOUNTER — OFFICE VISIT (OUTPATIENT)
Dept: FAMILY MEDICINE CLINIC | Facility: CLINIC | Age: 83
End: 2018-01-01

## 2018-01-01 ENCOUNTER — OFFICE VISIT (OUTPATIENT)
Dept: OTOLARYNGOLOGY | Facility: CLINIC | Age: 83
End: 2018-01-01

## 2018-01-01 ENCOUNTER — HOSPITAL ENCOUNTER (INPATIENT)
Facility: HOSPITAL | Age: 83
LOS: 4 days | Discharge: HOME | End: 2018-04-19
Attending: FAMILY MEDICINE | Admitting: FAMILY MEDICINE

## 2018-01-01 ENCOUNTER — TELEPHONE (OUTPATIENT)
Dept: FAMILY MEDICINE CLINIC | Facility: CLINIC | Age: 83
End: 2018-01-01

## 2018-01-01 ENCOUNTER — CLINICAL SUPPORT (OUTPATIENT)
Dept: FAMILY MEDICINE CLINIC | Facility: CLINIC | Age: 83
End: 2018-01-01

## 2018-01-01 ENCOUNTER — RESULTS ENCOUNTER (OUTPATIENT)
Dept: FAMILY MEDICINE CLINIC | Facility: CLINIC | Age: 83
End: 2018-01-01

## 2018-01-01 ENCOUNTER — APPOINTMENT (OUTPATIENT)
Dept: ONCOLOGY | Facility: HOSPITAL | Age: 83
End: 2018-01-01

## 2018-01-01 ENCOUNTER — APPOINTMENT (OUTPATIENT)
Dept: CT IMAGING | Facility: HOSPITAL | Age: 83
End: 2018-01-01

## 2018-01-01 ENCOUNTER — APPOINTMENT (OUTPATIENT)
Dept: CARDIOLOGY | Facility: HOSPITAL | Age: 83
End: 2018-01-01
Attending: FAMILY MEDICINE

## 2018-01-01 ENCOUNTER — HOSPITAL ENCOUNTER (OUTPATIENT)
Dept: GENERAL RADIOLOGY | Facility: HOSPITAL | Age: 83
Discharge: HOME | End: 2018-06-11
Attending: FAMILY MEDICINE | Admitting: FAMILY MEDICINE

## 2018-01-01 ENCOUNTER — HOSPITAL ENCOUNTER (EMERGENCY)
Facility: HOSPITAL | Age: 83
Discharge: HOME | End: 2018-04-14
Attending: EMERGENCY MEDICINE | Admitting: EMERGENCY MEDICINE

## 2018-01-01 VITALS
BODY MASS INDEX: 20.41 KG/M2 | OXYGEN SATURATION: 93 % | SYSTOLIC BLOOD PRESSURE: 124 MMHG | RESPIRATION RATE: 18 BRPM | WEIGHT: 127 LBS | HEART RATE: 84 BPM | DIASTOLIC BLOOD PRESSURE: 66 MMHG | TEMPERATURE: 97.7 F | HEIGHT: 66 IN

## 2018-01-01 VITALS
HEART RATE: 87 BPM | SYSTOLIC BLOOD PRESSURE: 131 MMHG | BODY MASS INDEX: 21.66 KG/M2 | HEIGHT: 65 IN | TEMPERATURE: 98.7 F | WEIGHT: 130 LBS | DIASTOLIC BLOOD PRESSURE: 61 MMHG | RESPIRATION RATE: 18 BRPM | OXYGEN SATURATION: 95 %

## 2018-01-01 VITALS
SYSTOLIC BLOOD PRESSURE: 127 MMHG | HEART RATE: 74 BPM | WEIGHT: 132 LBS | TEMPERATURE: 97.9 F | HEIGHT: 65 IN | BODY MASS INDEX: 21.99 KG/M2 | RESPIRATION RATE: 20 BRPM | DIASTOLIC BLOOD PRESSURE: 62 MMHG

## 2018-01-01 VITALS
DIASTOLIC BLOOD PRESSURE: 72 MMHG | HEART RATE: 74 BPM | SYSTOLIC BLOOD PRESSURE: 124 MMHG | RESPIRATION RATE: 16 BRPM | WEIGHT: 132 LBS | HEIGHT: 65 IN | TEMPERATURE: 98.5 F | BODY MASS INDEX: 21.99 KG/M2 | OXYGEN SATURATION: 97 %

## 2018-01-01 VITALS
WEIGHT: 132 LBS | RESPIRATION RATE: 16 BRPM | HEIGHT: 66 IN | SYSTOLIC BLOOD PRESSURE: 116 MMHG | HEART RATE: 74 BPM | BODY MASS INDEX: 21.21 KG/M2 | OXYGEN SATURATION: 98 % | TEMPERATURE: 98.1 F | DIASTOLIC BLOOD PRESSURE: 66 MMHG

## 2018-01-01 VITALS
HEART RATE: 61 BPM | HEIGHT: 66 IN | DIASTOLIC BLOOD PRESSURE: 70 MMHG | WEIGHT: 132 LBS | TEMPERATURE: 97.4 F | BODY MASS INDEX: 21.21 KG/M2 | SYSTOLIC BLOOD PRESSURE: 122 MMHG | RESPIRATION RATE: 18 BRPM | OXYGEN SATURATION: 95 %

## 2018-01-01 VITALS
SYSTOLIC BLOOD PRESSURE: 128 MMHG | BODY MASS INDEX: 21.52 KG/M2 | WEIGHT: 133.9 LBS | HEART RATE: 104 BPM | RESPIRATION RATE: 16 BRPM | DIASTOLIC BLOOD PRESSURE: 67 MMHG | TEMPERATURE: 98.2 F | HEIGHT: 66 IN | OXYGEN SATURATION: 95 %

## 2018-01-01 DIAGNOSIS — H61.20 EXCESSIVE CERUMEN IN EAR CANAL, UNSPECIFIED LATERALITY: ICD-10-CM

## 2018-01-01 DIAGNOSIS — H35.30 MACULAR DEGENERATION, UNSPECIFIED LATERALITY, UNSPECIFIED TYPE: Chronic | ICD-10-CM

## 2018-01-01 DIAGNOSIS — E78.5 HYPERLIPIDEMIA, UNSPECIFIED HYPERLIPIDEMIA TYPE: Chronic | ICD-10-CM

## 2018-01-01 DIAGNOSIS — M81.0 OSTEOPOROSIS, UNSPECIFIED OSTEOPOROSIS TYPE, UNSPECIFIED PATHOLOGICAL FRACTURE PRESENCE: ICD-10-CM

## 2018-01-01 DIAGNOSIS — I10 ESSENTIAL HYPERTENSION: Primary | Chronic | ICD-10-CM

## 2018-01-01 DIAGNOSIS — E53.8 LOW VITAMIN B12 LEVEL: Primary | ICD-10-CM

## 2018-01-01 DIAGNOSIS — R25.1 TREMOR: ICD-10-CM

## 2018-01-01 DIAGNOSIS — D64.9 ANEMIA, UNSPECIFIED TYPE: ICD-10-CM

## 2018-01-01 DIAGNOSIS — E53.8 LOW VITAMIN B12 LEVEL: ICD-10-CM

## 2018-01-01 DIAGNOSIS — F32.A DEPRESSION, UNSPECIFIED DEPRESSION TYPE: Chronic | ICD-10-CM

## 2018-01-01 DIAGNOSIS — M25.552 PAIN IN LEFT HIP: Primary | ICD-10-CM

## 2018-01-01 DIAGNOSIS — H61.23 BILATERAL IMPACTED CERUMEN: Primary | ICD-10-CM

## 2018-01-01 DIAGNOSIS — E11.9 CONTROLLED TYPE 2 DIABETES MELLITUS WITHOUT COMPLICATION, WITHOUT LONG-TERM CURRENT USE OF INSULIN (HCC): Chronic | ICD-10-CM

## 2018-01-01 DIAGNOSIS — J18.9 PNEUMONIA OF BOTH LOWER LOBES DUE TO INFECTIOUS ORGANISM: ICD-10-CM

## 2018-01-01 DIAGNOSIS — M25.552 PAIN IN LEFT HIP: ICD-10-CM

## 2018-01-01 DIAGNOSIS — E11.9 CONTROLLED TYPE 2 DIABETES MELLITUS WITHOUT COMPLICATION, WITHOUT LONG-TERM CURRENT USE OF INSULIN (HCC): Primary | Chronic | ICD-10-CM

## 2018-01-01 DIAGNOSIS — R26.9 GAIT DIFFICULTY: ICD-10-CM

## 2018-01-01 DIAGNOSIS — E53.8 B12 DEFICIENCY: ICD-10-CM

## 2018-01-01 DIAGNOSIS — Z00.00 WELLNESS EXAMINATION: ICD-10-CM

## 2018-01-01 DIAGNOSIS — I10 ESSENTIAL HYPERTENSION: Chronic | ICD-10-CM

## 2018-01-01 DIAGNOSIS — Z79.01 ANTICOAGULATED: ICD-10-CM

## 2018-01-01 DIAGNOSIS — J18.9 PNEUMONIA OF BOTH LOWER LOBES DUE TO INFECTIOUS ORGANISM: Primary | ICD-10-CM

## 2018-01-01 DIAGNOSIS — Z74.09 IMPAIRED FUNCTIONAL MOBILITY AND ACTIVITY TOLERANCE: ICD-10-CM

## 2018-01-01 DIAGNOSIS — R53.83 FATIGUE, UNSPECIFIED TYPE: ICD-10-CM

## 2018-01-01 DIAGNOSIS — G47.34 NOCTURNAL HYPOXEMIA: ICD-10-CM

## 2018-01-01 DIAGNOSIS — R26.9 GAIT DIFFICULTY: Primary | ICD-10-CM

## 2018-01-01 DIAGNOSIS — J44.1 COPD EXACERBATION (HCC): Primary | ICD-10-CM

## 2018-01-01 DIAGNOSIS — H40.9 GLAUCOMA, UNSPECIFIED GLAUCOMA TYPE, UNSPECIFIED LATERALITY: Chronic | ICD-10-CM

## 2018-01-01 DIAGNOSIS — I10 HYPERTENSION, UNSPECIFIED TYPE: Chronic | ICD-10-CM

## 2018-01-01 DIAGNOSIS — M81.0 OSTEOPOROSIS, UNSPECIFIED OSTEOPOROSIS TYPE, UNSPECIFIED PATHOLOGICAL FRACTURE PRESENCE: Primary | ICD-10-CM

## 2018-01-01 DIAGNOSIS — D64.9 ANEMIA, UNSPECIFIED TYPE: Primary | ICD-10-CM

## 2018-01-01 DIAGNOSIS — R63.4 WEIGHT LOSS: ICD-10-CM

## 2018-01-01 DIAGNOSIS — I49.9 CARDIAC ARRHYTHMIA, UNSPECIFIED CARDIAC ARRHYTHMIA TYPE: ICD-10-CM

## 2018-01-01 LAB
25(OH)D3+25(OH)D2 SERPL-MCNC: 48.2 NG/ML (ref 30–100)
ALBUMIN SERPL-MCNC: 3.3 G/DL (ref 3.5–5)
ALBUMIN SERPL-MCNC: 4.1 G/DL (ref 3.5–5)
ALBUMIN SERPL-MCNC: 4.2 G/DL (ref 3.5–5)
ALBUMIN/GLOB SERPL: 1.2 G/DL (ref 1.1–2.5)
ALBUMIN/GLOB SERPL: 1.2 G/DL (ref 1.1–2.5)
ALBUMIN/GLOB SERPL: 1.3 G/DL (ref 1.1–2.5)
ALBUMIN/GLOB SERPL: 1.4 G/DL (ref 1.1–2.5)
ALBUMIN/GLOB SERPL: 1.5 G/DL (ref 1.1–2.5)
ALP SERPL-CCNC: 67 U/L (ref 24–120)
ALP SERPL-CCNC: 69 U/L (ref 24–120)
ALP SERPL-CCNC: 76 U/L (ref 24–120)
ALP SERPL-CCNC: 82 U/L (ref 24–120)
ALP SERPL-CCNC: 88 U/L (ref 24–120)
ALT SERPL W P-5'-P-CCNC: 25 U/L (ref 0–54)
ALT SERPL W P-5'-P-CCNC: 35 U/L (ref 0–54)
ALT SERPL-CCNC: 20 U/L (ref 0–54)
ALT SERPL-CCNC: 21 U/L (ref 0–54)
ALT SERPL-CCNC: 25 U/L (ref 0–54)
ANION GAP SERPL CALCULATED.3IONS-SCNC: 11 MMOL/L (ref 4–13)
ANION GAP SERPL CALCULATED.3IONS-SCNC: 12 MMOL/L (ref 4–13)
ANION GAP SERPL CALCULATED.3IONS-SCNC: 6 MMOL/L (ref 4–13)
ANION GAP SERPL CALCULATED.3IONS-SCNC: 8 MMOL/L (ref 4–13)
ANION GAP SERPL CALCULATED.3IONS-SCNC: 9 MMOL/L (ref 4–13)
ARTERIAL PATENCY WRIST A: POSITIVE
AST SERPL-CCNC: 21 U/L (ref 7–45)
AST SERPL-CCNC: 21 U/L (ref 7–45)
AST SERPL-CCNC: 22 U/L (ref 7–45)
AST SERPL-CCNC: 28 U/L (ref 7–45)
AST SERPL-CCNC: 44 U/L (ref 7–45)
ATMOSPHERIC PRESS: 747 MMHG
BACTERIA SPEC AEROBE CULT: NORMAL
BACTERIA SPEC AEROBE CULT: NORMAL
BASE EXCESS BLDA CALC-SCNC: 1 MMOL/L (ref 0–2)
BASOPHILS # BLD AUTO: 0.01 10*3/MM3 (ref 0–0.2)
BASOPHILS # BLD AUTO: 0.01 10*3/MM3 (ref 0–0.2)
BASOPHILS # BLD AUTO: 0.02 10*3/MM3 (ref 0–0.2)
BASOPHILS # BLD AUTO: 0.03 10*3/MM3 (ref 0–0.2)
BASOPHILS # BLD AUTO: 0.04 10*3/MM3 (ref 0–0.2)
BASOPHILS NFR BLD AUTO: 0.1 % (ref 0–2)
BASOPHILS NFR BLD AUTO: 0.1 % (ref 0–2)
BASOPHILS NFR BLD AUTO: 0.2 % (ref 0–2)
BASOPHILS NFR BLD AUTO: 0.3 % (ref 0–2)
BASOPHILS NFR BLD AUTO: 0.4 % (ref 0–2)
BASOPHILS NFR BLD AUTO: 0.5 % (ref 0–2)
BASOPHILS NFR BLD AUTO: 0.5 % (ref 0–2)
BDY SITE: ABNORMAL
BH CV ECHO MEAS - AI DEC SLOPE: 285 CM/SEC^2
BH CV ECHO MEAS - AI MAX PG: 72.6 MMHG
BH CV ECHO MEAS - AI MAX VEL: 426 CM/SEC
BH CV ECHO MEAS - AI P1/2T: 437.8 MSEC
BH CV ECHO MEAS - AO MAX PG (FULL): 6.9 MMHG
BH CV ECHO MEAS - AO MAX PG: 12.7 MMHG
BH CV ECHO MEAS - AO MEAN PG (FULL): 3.5 MMHG
BH CV ECHO MEAS - AO MEAN PG: 7.5 MMHG
BH CV ECHO MEAS - AO ROOT AREA (BSA CORRECTED): 1.6
BH CV ECHO MEAS - AO ROOT AREA: 5.3 CM^2
BH CV ECHO MEAS - AO ROOT DIAM: 2.6 CM
BH CV ECHO MEAS - AO V2 MAX: 178 CM/SEC
BH CV ECHO MEAS - AO V2 MEAN: 130 CM/SEC
BH CV ECHO MEAS - AO V2 VTI: 36.5 CM
BH CV ECHO MEAS - AVA(I,A): 2.3 CM^2
BH CV ECHO MEAS - AVA(I,D): 2.3 CM^2
BH CV ECHO MEAS - AVA(V,A): 2.1 CM^2
BH CV ECHO MEAS - AVA(V,D): 2.1 CM^2
BH CV ECHO MEAS - BSA(HAYCOCK): 1.7 M^2
BH CV ECHO MEAS - BSA: 1.7 M^2
BH CV ECHO MEAS - BZI_BMI: 21.8 KILOGRAMS/M^2
BH CV ECHO MEAS - BZI_METRIC_HEIGHT: 165.1 CM
BH CV ECHO MEAS - BZI_METRIC_WEIGHT: 59.4 KG
BH CV ECHO MEAS - CONTRAST EF 4CH: 42.5 ML/M^2
BH CV ECHO MEAS - EDV(CUBED): 71 ML
BH CV ECHO MEAS - EDV(MOD-SP4): 62.8 ML
BH CV ECHO MEAS - EDV(TEICH): 75.9 ML
BH CV ECHO MEAS - EF(CUBED): 57.2 %
BH CV ECHO MEAS - EF(MOD-SP4): 42.5 %
BH CV ECHO MEAS - EF(TEICH): 49.3 %
BH CV ECHO MEAS - ESV(CUBED): 30.4 ML
BH CV ECHO MEAS - ESV(MOD-SP4): 36.1 ML
BH CV ECHO MEAS - ESV(TEICH): 38.5 ML
BH CV ECHO MEAS - FS: 24.6 %
BH CV ECHO MEAS - IVS/LVPW: 0.82
BH CV ECHO MEAS - IVSD: 1.2 CM
BH CV ECHO MEAS - LA DIMENSION: 4.3 CM
BH CV ECHO MEAS - LA/AO: 1.7
BH CV ECHO MEAS - LV DIASTOLIC VOL/BSA (35-75): 38 ML/M^2
BH CV ECHO MEAS - LV MASS(C)D: 206.6 GRAMS
BH CV ECHO MEAS - LV MASS(C)DI: 125 GRAMS/M^2
BH CV ECHO MEAS - LV MAX PG: 5.8 MMHG
BH CV ECHO MEAS - LV MEAN PG: 4 MMHG
BH CV ECHO MEAS - LV SYSTOLIC VOL/BSA (12-30): 21.8 ML/M^2
BH CV ECHO MEAS - LV V1 MAX: 120 CM/SEC
BH CV ECHO MEAS - LV V1 MEAN: 100 CM/SEC
BH CV ECHO MEAS - LV V1 VTI: 26.4 CM
BH CV ECHO MEAS - LVIDD: 4.1 CM
BH CV ECHO MEAS - LVIDS: 3.1 CM
BH CV ECHO MEAS - LVLD AP4: 7.6 CM
BH CV ECHO MEAS - LVLS AP4: 7.4 CM
BH CV ECHO MEAS - LVOT AREA (M): 3.1 CM^2
BH CV ECHO MEAS - LVOT AREA: 3.1 CM^2
BH CV ECHO MEAS - LVOT DIAM: 2 CM
BH CV ECHO MEAS - LVPWD: 1.5 CM
BH CV ECHO MEAS - MR ALIAS VEL: 30.8 CM/SEC
BH CV ECHO MEAS - MR ERO: 0.15 CM^2
BH CV ECHO MEAS - MR FLOW RATE: 69.7 CM^3/SEC
BH CV ECHO MEAS - MR MAX PG: 82.1 MMHG
BH CV ECHO MEAS - MR MAX VEL: 453 CM/SEC
BH CV ECHO MEAS - MR MEAN PG: 67 MMHG
BH CV ECHO MEAS - MR MEAN VEL: 397 CM/SEC
BH CV ECHO MEAS - MR PISA RADIUS: 0.6 CM
BH CV ECHO MEAS - MR PISA: 2.3 CM^2
BH CV ECHO MEAS - MR VOLUME: 23.8 ML
BH CV ECHO MEAS - MR VTI: 155 CM
BH CV ECHO MEAS - MV DEC TIME: 0.21 SEC
BH CV ECHO MEAS - MV E MAX VEL: 130 CM/SEC
BH CV ECHO MEAS - RAP SYSTOLE: 10 MMHG
BH CV ECHO MEAS - RVSP: 57.9 MMHG
BH CV ECHO MEAS - SI(AO): 117.3 ML/M^2
BH CV ECHO MEAS - SI(CUBED): 24.6 ML/M^2
BH CV ECHO MEAS - SI(LVOT): 50.2 ML/M^2
BH CV ECHO MEAS - SI(MOD-SP4): 16.2 ML/M^2
BH CV ECHO MEAS - SI(TEICH): 22.7 ML/M^2
BH CV ECHO MEAS - SV(AO): 193.8 ML
BH CV ECHO MEAS - SV(CUBED): 40.6 ML
BH CV ECHO MEAS - SV(LVOT): 82.9 ML
BH CV ECHO MEAS - SV(MOD-SP4): 26.7 ML
BH CV ECHO MEAS - SV(TEICH): 37.4 ML
BH CV ECHO MEAS - TR MAX VEL: 346 CM/SEC
BILIRUB SERPL-MCNC: 0.5 MG/DL (ref 0.1–1)
BILIRUB SERPL-MCNC: 0.5 MG/DL (ref 0.1–1)
BILIRUB SERPL-MCNC: 0.6 MG/DL (ref 0.1–1)
BILIRUB UR QL STRIP: NEGATIVE
BODY TEMPERATURE: 37 C
BUN BLD-MCNC: 20 MG/DL (ref 5–21)
BUN BLD-MCNC: 20 MG/DL (ref 5–21)
BUN BLD-MCNC: 21 MG/DL (ref 5–21)
BUN BLD-MCNC: 22 MG/DL (ref 5–21)
BUN BLD-MCNC: 32 MG/DL (ref 5–21)
BUN SERPL-MCNC: 19 MG/DL (ref 5–21)
BUN SERPL-MCNC: 22 MG/DL (ref 5–21)
BUN SERPL-MCNC: 24 MG/DL (ref 5–21)
BUN/CREAT SERPL: 25.3 (ref 7–25)
BUN/CREAT SERPL: 27.9 (ref 7–25)
BUN/CREAT SERPL: 28.2 (ref 7–25)
BUN/CREAT SERPL: 28.8 (ref 7–25)
BUN/CREAT SERPL: 29.4 (ref 7–25)
BUN/CREAT SERPL: 29.7 (ref 7–25)
BUN/CREAT SERPL: 33.3 (ref 7–25)
BUN/CREAT SERPL: 46.4 (ref 7–25)
CALCIUM SERPL-MCNC: 10.2 MG/DL (ref 8.4–10.4)
CALCIUM SERPL-MCNC: 10.7 MG/DL (ref 8.4–10.4)
CALCIUM SERPL-MCNC: 9.5 MG/DL (ref 8.4–10.4)
CALCIUM SPEC-SCNC: 10 MG/DL (ref 8.4–10.4)
CALCIUM SPEC-SCNC: 9.3 MG/DL (ref 8.4–10.4)
CALCIUM SPEC-SCNC: 9.5 MG/DL (ref 8.4–10.4)
CALCIUM SPEC-SCNC: 9.6 MG/DL (ref 8.4–10.4)
CALCIUM SPEC-SCNC: 9.8 MG/DL (ref 8.4–10.4)
CHLORIDE SERPL-SCNC: 100 MMOL/L (ref 98–110)
CHLORIDE SERPL-SCNC: 101 MMOL/L (ref 98–110)
CHLORIDE SERPL-SCNC: 97 MMOL/L (ref 98–110)
CHLORIDE SERPL-SCNC: 98 MMOL/L (ref 98–110)
CHOLEST SERPL-MCNC: 127 MG/DL (ref 130–200)
CLARITY UR: CLEAR
CO2 SERPL-SCNC: 30 MMOL/L (ref 24–31)
CO2 SERPL-SCNC: 31 MMOL/L (ref 24–31)
CO2 SERPL-SCNC: 32 MMOL/L (ref 24–31)
CO2 SERPL-SCNC: 34 MMOL/L (ref 24–31)
CO2 SERPL-SCNC: 35 MMOL/L (ref 24–31)
COLOR UR: YELLOW
CREAT BLD-MCNC: 0.63 MG/DL (ref 0.5–1.4)
CREAT BLD-MCNC: 0.68 MG/DL (ref 0.5–1.4)
CREAT BLD-MCNC: 0.69 MG/DL (ref 0.5–1.4)
CREAT BLD-MCNC: 0.71 MG/DL (ref 0.5–1.4)
CREAT BLD-MCNC: 0.74 MG/DL (ref 0.5–1.4)
CREAT SERPL-MCNC: 0.66 MG/DL (ref 0.5–1.4)
CREAT SERPL-MCNC: 0.86 MG/DL (ref 0.5–1.4)
CREAT SERPL-MCNC: 0.87 MG/DL (ref 0.5–1.4)
D DIMER PPP FEU-MCNC: 0.91 MG/L (FEU) (ref 0–0.5)
D DIMER PPP FEU-MCNC: 1.67 MG/L (FEU) (ref 0–0.5)
D-LACTATE SERPL-SCNC: 1.6 MMOL/L (ref 0.5–2)
D-LACTATE SERPL-SCNC: 2.4 MMOL/L (ref 0.5–2)
DEPRECATED RDW RBC AUTO: 42.8 FL (ref 40–54)
DEPRECATED RDW RBC AUTO: 43.1 FL (ref 40–54)
DEPRECATED RDW RBC AUTO: 43.1 FL (ref 40–54)
DEPRECATED RDW RBC AUTO: 43.3 FL (ref 40–54)
DEPRECATED RDW RBC AUTO: 43.7 FL (ref 40–54)
E/E' RATIO: 20.6
EOSINOPHIL # BLD AUTO: 0 10*3/MM3 (ref 0–0.7)
EOSINOPHIL # BLD AUTO: 0 10*3/MM3 (ref 0–0.7)
EOSINOPHIL # BLD AUTO: 0.05 10*3/MM3 (ref 0–0.7)
EOSINOPHIL # BLD AUTO: 0.07 10*3/MM3 (ref 0–0.7)
EOSINOPHIL # BLD AUTO: 0.19 10*3/MM3 (ref 0–0.7)
EOSINOPHIL # BLD AUTO: 0.2 10*3/MM3 (ref 0–0.7)
EOSINOPHIL # BLD AUTO: 0.31 10*3/MM3 (ref 0–0.7)
EOSINOPHIL NFR BLD AUTO: 0 % (ref 0–4)
EOSINOPHIL NFR BLD AUTO: 0 % (ref 0–4)
EOSINOPHIL NFR BLD AUTO: 0.7 % (ref 0–4)
EOSINOPHIL NFR BLD AUTO: 0.9 % (ref 0–4)
EOSINOPHIL NFR BLD AUTO: 1.8 % (ref 0–4)
EOSINOPHIL NFR BLD AUTO: 2.2 % (ref 0–4)
EOSINOPHIL NFR BLD AUTO: 4.7 % (ref 0–4)
ERYTHROCYTE [DISTWIDTH] IN BLOOD BY AUTOMATED COUNT: 13.1 % (ref 12–15)
ERYTHROCYTE [DISTWIDTH] IN BLOOD BY AUTOMATED COUNT: 13.2 % (ref 12–15)
ERYTHROCYTE [DISTWIDTH] IN BLOOD BY AUTOMATED COUNT: 13.2 % (ref 12–15)
ERYTHROCYTE [DISTWIDTH] IN BLOOD BY AUTOMATED COUNT: 13.8 % (ref 12–15)
ERYTHROCYTE [DISTWIDTH] IN BLOOD BY AUTOMATED COUNT: 13.8 % (ref 12–15)
ERYTHROCYTE [DISTWIDTH] IN BLOOD BY AUTOMATED COUNT: 15.6 % (ref 12–15)
FOLATE SERPL-MCNC: >20 NG/ML
FOLATE SERPL-MCNC: >20 NG/ML
GAS FLOW AIRWAY: 5 LPM
GFR SERPL CREATININE-BSD FRML MDRD: 74 ML/MIN/1.73
GFR SERPL CREATININE-BSD FRML MDRD: 77 ML/MIN/1.73
GFR SERPL CREATININE-BSD FRML MDRD: 80 ML/MIN/1.73
GFR SERPL CREATININE-BSD FRML MDRD: 81 ML/MIN/1.73
GFR SERPL CREATININE-BSD FRML MDRD: 89 ML/MIN/1.73
GFR SERPLBLD CREATININE-BSD FMLA CKD-EPI: 61 ML/MIN/1.73
GFR SERPLBLD CREATININE-BSD FMLA CKD-EPI: 62 ML/MIN/1.73
GFR SERPLBLD CREATININE-BSD FMLA CKD-EPI: 74 ML/MIN/1.73
GFR SERPLBLD CREATININE-BSD FMLA CKD-EPI: 75 ML/MIN/1.73
GIANT PLATELETS: ABNORMAL
GLOBULIN SER CALC-MCNC: 2.6 GM/DL
GLOBULIN SER CALC-MCNC: 2.8 GM/DL
GLOBULIN SER CALC-MCNC: 2.9 GM/DL
GLOBULIN UR ELPH-MCNC: 3.4 GM/DL
GLOBULIN UR ELPH-MCNC: 3.4 GM/DL
GLUCOSE BLD-MCNC: 249 MG/DL (ref 70–100)
GLUCOSE BLD-MCNC: 263 MG/DL (ref 70–100)
GLUCOSE BLD-MCNC: 344 MG/DL (ref 70–100)
GLUCOSE BLD-MCNC: 44 MG/DL (ref 70–100)
GLUCOSE BLD-MCNC: 64 MG/DL (ref 70–100)
GLUCOSE BLDC GLUCOMTR-MCNC: 115 MG/DL (ref 70–130)
GLUCOSE BLDC GLUCOMTR-MCNC: 134 MG/DL (ref 70–130)
GLUCOSE BLDC GLUCOMTR-MCNC: 136 MG/DL (ref 70–130)
GLUCOSE BLDC GLUCOMTR-MCNC: 139 MG/DL (ref 70–130)
GLUCOSE BLDC GLUCOMTR-MCNC: 177 MG/DL (ref 70–130)
GLUCOSE BLDC GLUCOMTR-MCNC: 207 MG/DL (ref 70–130)
GLUCOSE BLDC GLUCOMTR-MCNC: 311 MG/DL (ref 70–130)
GLUCOSE BLDC GLUCOMTR-MCNC: 76 MG/DL (ref 70–130)
GLUCOSE SERPL-MCNC: 129 MG/DL (ref 70–100)
GLUCOSE SERPL-MCNC: 191 MG/DL (ref 70–100)
GLUCOSE SERPL-MCNC: 316 MG/DL (ref 70–100)
GLUCOSE UR STRIP-MCNC: ABNORMAL MG/DL
HBA1C MFR BLD: 6.6 %
HBA1C MFR BLD: 6.9 %
HCO3 BLDA-SCNC: 27.1 MMOL/L (ref 20–26)
HCT VFR BLD AUTO: 34.9 % (ref 37–47)
HCT VFR BLD AUTO: 35.9 % (ref 37–47)
HCT VFR BLD AUTO: 36.5 % (ref 37–47)
HCT VFR BLD AUTO: 36.6 % (ref 37–47)
HCT VFR BLD AUTO: 37.4 % (ref 37–47)
HCT VFR BLD AUTO: 38.2 % (ref 37–47)
HCT VFR BLD AUTO: 39.2 % (ref 37–47)
HCT VFR BLD AUTO: 39.5 % (ref 37–47)
HDLC SERPL-MCNC: 57 MG/DL
HGB BLD-MCNC: 11.3 G/DL (ref 12–16)
HGB BLD-MCNC: 11.6 G/DL (ref 12–16)
HGB BLD-MCNC: 11.8 G/DL (ref 12–16)
HGB BLD-MCNC: 11.9 G/DL (ref 12–16)
HGB BLD-MCNC: 12 G/DL (ref 12–16)
HGB BLD-MCNC: 12 G/DL (ref 12–16)
HGB BLD-MCNC: 12.2 G/DL (ref 12–16)
HGB BLD-MCNC: 12.2 G/DL (ref 12–16)
HGB UR QL STRIP.AUTO: NEGATIVE
HOLD SPECIMEN: NORMAL
IMM GRANULOCYTES # BLD: 0.02 10*3/MM3 (ref 0–0.03)
IMM GRANULOCYTES # BLD: 0.03 10*3/MM3 (ref 0–0.03)
IMM GRANULOCYTES # BLD: 0.04 10*3/MM3 (ref 0–0.03)
IMM GRANULOCYTES # BLD: 0.04 10*3/MM3 (ref 0–0.03)
IMM GRANULOCYTES # BLD: 0.05 10*3/MM3 (ref 0–0.03)
IMM GRANULOCYTES # BLD: 0.06 10*3/MM3 (ref 0–0.03)
IMM GRANULOCYTES # BLD: 0.08 10*3/MM3 (ref 0–0.03)
IMM GRANULOCYTES NFR BLD: 0.3 % (ref 0–5)
IMM GRANULOCYTES NFR BLD: 0.4 % (ref 0–5)
IMM GRANULOCYTES NFR BLD: 0.5 % (ref 0–5)
IMM GRANULOCYTES NFR BLD: 0.5 % (ref 0–5)
IMM GRANULOCYTES NFR BLD: 0.6 % (ref 0–5)
IRON SATN MFR SERPL: 24 % (ref 20–45)
IRON SERPL-MCNC: 77 MCG/DL (ref 42–180)
KETONES UR QL STRIP: NEGATIVE
LDLC SERPL CALC-MCNC: 51 MG/DL (ref 0–99)
LEFT ATRIUM VOLUME INDEX: 49.6 ML/M2
LEFT ATRIUM VOLUME: 81.9 CM3
LEUKOCYTE ESTERASE UR QL STRIP.AUTO: NEGATIVE
LV EF 2D ECHO EST: 45 %
LYMPHOCYTES # BLD AUTO: 0.6 10*3/MM3 (ref 0.72–4.86)
LYMPHOCYTES # BLD AUTO: 0.69 10*3/MM3 (ref 0.72–4.86)
LYMPHOCYTES # BLD AUTO: 0.77 10*3/MM3 (ref 0.72–4.86)
LYMPHOCYTES # BLD AUTO: 0.81 10*3/MM3 (ref 0.72–4.86)
LYMPHOCYTES # BLD AUTO: 0.84 10*3/MM3 (ref 0.72–4.86)
LYMPHOCYTES # BLD AUTO: 0.85 10*3/MM3 (ref 0.72–4.86)
LYMPHOCYTES # BLD AUTO: 0.93 10*3/MM3 (ref 0.72–4.86)
LYMPHOCYTES # BLD MANUAL: 0.5 10*3/MM3 (ref 0.8–7)
LYMPHOCYTES NFR BLD AUTO: 10 % (ref 15–45)
LYMPHOCYTES NFR BLD AUTO: 12.4 % (ref 15–45)
LYMPHOCYTES NFR BLD AUTO: 14 % (ref 15–45)
LYMPHOCYTES NFR BLD AUTO: 4.8 % (ref 15–45)
LYMPHOCYTES NFR BLD AUTO: 7 % (ref 15–45)
LYMPHOCYTES NFR BLD AUTO: 8.7 % (ref 15–45)
LYMPHOCYTES NFR BLD AUTO: 9.5 % (ref 15–45)
LYMPHOCYTES NFR BLD MANUAL: 4 % (ref 0–12)
LYMPHOCYTES NFR BLD MANUAL: 8.1 % (ref 24–44)
Lab: ABNORMAL
MAGNESIUM SERPL-MCNC: 1.7 MG/DL (ref 1.4–2.2)
MAXIMAL PREDICTED HEART RATE: 130 BPM
MCH RBC QN AUTO: 28.8 PG (ref 28–32)
MCH RBC QN AUTO: 29 PG (ref 28–32)
MCH RBC QN AUTO: 29.2 PG (ref 28–32)
MCH RBC QN AUTO: 29.3 PG (ref 28–32)
MCH RBC QN AUTO: 29.7 PG (ref 28–32)
MCHC RBC AUTO-ENTMCNC: 30.4 G/DL (ref 33–36)
MCHC RBC AUTO-ENTMCNC: 31.1 G/DL (ref 33–36)
MCHC RBC AUTO-ENTMCNC: 31.4 G/DL (ref 33–36)
MCHC RBC AUTO-ENTMCNC: 32.2 G/DL (ref 33–36)
MCHC RBC AUTO-ENTMCNC: 32.3 G/DL (ref 33–36)
MCHC RBC AUTO-ENTMCNC: 32.4 G/DL (ref 33–36)
MCHC RBC AUTO-ENTMCNC: 32.6 G/DL (ref 33–36)
MCHC RBC AUTO-ENTMCNC: 32.6 G/DL (ref 33–36)
MCV RBC AUTO: 89.1 FL (ref 82–98)
MCV RBC AUTO: 89.7 FL (ref 82–98)
MCV RBC AUTO: 89.7 FL (ref 82–98)
MCV RBC AUTO: 90.2 FL (ref 82–98)
MCV RBC AUTO: 90.6 FL (ref 82–98)
MCV RBC AUTO: 93.8 FL (ref 82–98)
MCV RBC AUTO: 94.6 FL (ref 82–98)
MCV RBC AUTO: 95.4 FL (ref 82–98)
MICROALBUMIN UR-MCNC: 28.8 UG/ML
MODALITY: ABNORMAL
MONOCYTES # BLD AUTO: 0.25 10*3/MM3 (ref 0–1)
MONOCYTES # BLD AUTO: 0.58 10*3/MM3 (ref 0.19–1.3)
MONOCYTES # BLD AUTO: 0.64 10*3/MM3 (ref 0.19–1.3)
MONOCYTES # BLD AUTO: 0.74 10*3/MM3 (ref 0.19–1.3)
MONOCYTES # BLD AUTO: 0.74 10*3/MM3 (ref 0.19–1.3)
MONOCYTES # BLD AUTO: 0.79 10*3/MM3 (ref 0.19–1.3)
MONOCYTES # BLD AUTO: 0.86 10*3/MM3 (ref 0.19–1.3)
MONOCYTES # BLD AUTO: 0.95 10*3/MM3 (ref 0.19–1.3)
MONOCYTES NFR BLD AUTO: 10.9 % (ref 4–12)
MONOCYTES NFR BLD AUTO: 11.1 % (ref 4–12)
MONOCYTES NFR BLD AUTO: 11.6 % (ref 4–12)
MONOCYTES NFR BLD AUTO: 4.6 % (ref 4–12)
MONOCYTES NFR BLD AUTO: 6.7 % (ref 4–12)
MONOCYTES NFR BLD AUTO: 8.7 % (ref 4–12)
MONOCYTES NFR BLD AUTO: 9.6 % (ref 4–12)
NEUTROPHILS # BLD AUTO: 11.3 10*3/MM3 (ref 1.87–8.4)
NEUTROPHILS # BLD AUTO: 4.72 10*3/MM3 (ref 1.87–8.4)
NEUTROPHILS # BLD AUTO: 5.06 10*3/MM3 (ref 1.87–8.4)
NEUTROPHILS # BLD AUTO: 5.3 10*3/MM3 (ref 1–11)
NEUTROPHILS # BLD AUTO: 6.2 10*3/MM3 (ref 1.87–8.4)
NEUTROPHILS # BLD AUTO: 6.63 10*3/MM3 (ref 1.87–8.4)
NEUTROPHILS # BLD AUTO: 6.73 10*3/MM3 (ref 1.87–8.4)
NEUTROPHILS # BLD AUTO: 9.17 10*3/MM3 (ref 1.87–8.4)
NEUTROPHILS NFR BLD AUTO: 70.9 % (ref 39–78)
NEUTROPHILS NFR BLD AUTO: 74.6 % (ref 39–78)
NEUTROPHILS NFR BLD AUTO: 78.3 % (ref 39–78)
NEUTROPHILS NFR BLD AUTO: 78.5 % (ref 39–78)
NEUTROPHILS NFR BLD AUTO: 78.7 % (ref 39–78)
NEUTROPHILS NFR BLD AUTO: 83.7 % (ref 39–78)
NEUTROPHILS NFR BLD AUTO: 89.8 % (ref 39–78)
NEUTROPHILS NFR BLD MANUAL: 85.9 % (ref 37–80)
NITRITE UR QL STRIP: NEGATIVE
NRBC BLD AUTO-RTO: 0 /100 WBC (ref 0–0)
NRBC BLD MANUAL-RTO: 0 /100 WBC (ref 0–0)
NT-PROBNP SERPL-MCNC: 3410 PG/ML (ref 0–1800)
NT-PROBNP SERPL-MCNC: 4250 PG/ML (ref 0–1800)
PCO2 BLDA: 48.2 MM HG (ref 35–45)
PH BLDA: 7.36 PH UNITS (ref 7.35–7.45)
PH UR STRIP.AUTO: <=5 [PH] (ref 5–8)
PLATELET # BLD AUTO: 204 10*3/MM3 (ref 130–400)
PLATELET # BLD AUTO: 210 10*3/MM3 (ref 130–400)
PLATELET # BLD AUTO: 211 10*3/MM3 (ref 130–400)
PLATELET # BLD AUTO: 233 10*3/MM3 (ref 130–400)
PLATELET # BLD AUTO: 239 10*3/MM3 (ref 130–400)
PLATELET # BLD AUTO: 251 10*3/MM3 (ref 130–400)
PLATELET # BLD AUTO: 267 10*3/MM3 (ref 130–400)
PLATELET # BLD AUTO: 311 10*3/MM3 (ref 130–400)
PMV BLD AUTO: 10.4 FL (ref 6–12)
PMV BLD AUTO: 10.5 FL (ref 6–12)
PMV BLD AUTO: 10.5 FL (ref 6–12)
PO2 BLDA: 98.2 MM HG (ref 83–108)
POTASSIUM BLD-SCNC: 3.3 MMOL/L (ref 3.5–5.3)
POTASSIUM BLD-SCNC: 4 MMOL/L (ref 3.5–5.3)
POTASSIUM BLD-SCNC: 4.2 MMOL/L (ref 3.5–5.3)
POTASSIUM BLD-SCNC: 4.5 MMOL/L (ref 3.5–5.3)
POTASSIUM BLD-SCNC: 5 MMOL/L (ref 3.5–5.3)
POTASSIUM SERPL-SCNC: 4.3 MMOL/L (ref 3.5–5.3)
POTASSIUM SERPL-SCNC: 4.7 MMOL/L (ref 3.5–5.3)
POTASSIUM SERPL-SCNC: 5.1 MMOL/L (ref 3.5–5.3)
PROT SERPL-MCNC: 5.9 G/DL (ref 6.3–8.7)
PROT SERPL-MCNC: 6.9 G/DL (ref 6.3–8.7)
PROT SERPL-MCNC: 7.1 G/DL (ref 6.3–8.7)
PROT SERPL-MCNC: 7.6 G/DL (ref 6.3–8.7)
PROT SERPL-MCNC: 7.6 G/DL (ref 6.3–8.7)
PROT UR QL STRIP: NEGATIVE
RBC # BLD AUTO: 3.87 10*6/MM3 (ref 4.2–5.4)
RBC # BLD AUTO: 4.03 10*6/MM3 (ref 4.2–5.4)
RBC # BLD AUTO: 4.04 10*6/MM3 (ref 4.2–5.4)
RBC # BLD AUTO: 4.04 10*6/MM3 (ref 4.2–5.4)
RBC # BLD AUTO: 4.07 10*6/MM3 (ref 4.2–5.4)
RBC # BLD AUTO: 4.14 10*6/MM3 (ref 4.2–5.4)
RBC # BLD AUTO: 4.17 10*6/MM3 (ref 4.2–5.4)
RBC # BLD AUTO: 4.18 10*6/MM3 (ref 4.2–5.4)
RBC MORPH BLD: NORMAL
SAO2 % BLDCOA: 97.7 % (ref 94–99)
SODIUM BLD-SCNC: 138 MMOL/L (ref 135–145)
SODIUM BLD-SCNC: 139 MMOL/L (ref 135–145)
SODIUM BLD-SCNC: 141 MMOL/L (ref 135–145)
SODIUM BLD-SCNC: 141 MMOL/L (ref 135–145)
SODIUM BLD-SCNC: 146 MMOL/L (ref 135–145)
SODIUM SERPL-SCNC: 138 MMOL/L (ref 135–145)
SODIUM SERPL-SCNC: 142 MMOL/L (ref 135–145)
SODIUM SERPL-SCNC: 143 MMOL/L (ref 135–145)
SP GR UR STRIP: 1.01 (ref 1–1.03)
STRESS TARGET HR: 111 BPM
T4 FREE SERPL-MCNC: 1.59 NG/DL (ref 0.78–2.19)
TIBC SERPL-MCNC: 315 MCG/DL (ref 225–420)
TRIGL SERPL-MCNC: 95 MG/DL (ref 0–149)
TROPONIN I SERPL-MCNC: <0.012 NG/ML (ref 0–0.03)
TROPONIN I SERPL-MCNC: <0.012 NG/ML (ref 0–0.03)
TSH SERPL DL<=0.005 MIU/L-ACNC: 1.43 MIU/ML (ref 0.47–4.68)
TSH SERPL DL<=0.005 MIU/L-ACNC: 1.55 MIU/ML (ref 0.47–4.68)
TSH SERPL DL<=0.05 MIU/L-ACNC: 2.36 MIU/ML (ref 0.47–4.68)
UIBC SERPL-MCNC: 238 MCG/DL
UROBILINOGEN UR QL STRIP: ABNORMAL
VARIANT LYMPHS NFR BLD MANUAL: 2 % (ref 0–5)
VENTILATOR MODE: ABNORMAL
VIT B12 SERPL-MCNC: 237 PG/ML (ref 239–931)
VIT B12 SERPL-MCNC: 422 PG/ML (ref 239–931)
VLDLC SERPL CALC-MCNC: 19 MG/DL
WBC # BLD AUTO: 10.97 10*3/MM3 (ref 4.8–10.8)
WBC # BLD AUTO: 6.65 10*3/MM3 (ref 4.8–10.8)
WBC # BLD AUTO: 8.47 10*3/MM3 (ref 4.8–10.8)
WBC MORPH BLD: NORMAL
WBC NRBC COR # BLD: 12.58 10*3/MM3 (ref 4.8–10.8)
WBC NRBC COR # BLD: 6.17 10*3/MM3 (ref 4.8–10.8)
WBC NRBC COR # BLD: 6.79 10*3/MM3 (ref 4.8–10.8)
WBC NRBC COR # BLD: 7.9 10*3/MM3 (ref 4.8–10.8)
WBC NRBC COR # BLD: 8.55 10*3/MM3 (ref 4.8–10.8)
WHOLE BLOOD HOLD SPECIMEN: NORMAL

## 2018-01-01 PROCEDURE — 96372 THER/PROPH/DIAG INJ SC/IM: CPT | Performed by: FAMILY MEDICINE

## 2018-01-01 PROCEDURE — 85379 FIBRIN DEGRADATION QUANT: CPT | Performed by: EMERGENCY MEDICINE

## 2018-01-01 PROCEDURE — 96375 TX/PRO/DX INJ NEW DRUG ADDON: CPT

## 2018-01-01 PROCEDURE — 84443 ASSAY THYROID STIM HORMONE: CPT | Performed by: EMERGENCY MEDICINE

## 2018-01-01 PROCEDURE — 25010000002 LEVOFLOXACIN PER 250 MG: Performed by: FAMILY MEDICINE

## 2018-01-01 PROCEDURE — 36600 WITHDRAWAL OF ARTERIAL BLOOD: CPT

## 2018-01-01 PROCEDURE — 94618 PULMONARY STRESS TESTING: CPT

## 2018-01-01 PROCEDURE — 25010000002 METHYLPREDNISOLONE PER 125 MG: Performed by: EMERGENCY MEDICINE

## 2018-01-01 PROCEDURE — 63710000001 INSULIN LISPRO (HUMAN) PER 5 UNITS: Performed by: FAMILY MEDICINE

## 2018-01-01 PROCEDURE — 93306 TTE W/DOPPLER COMPLETE: CPT | Performed by: INTERNAL MEDICINE

## 2018-01-01 PROCEDURE — 80048 BASIC METABOLIC PNL TOTAL CA: CPT | Performed by: FAMILY MEDICINE

## 2018-01-01 PROCEDURE — 93005 ELECTROCARDIOGRAM TRACING: CPT | Performed by: FAMILY MEDICINE

## 2018-01-01 PROCEDURE — 94640 AIRWAY INHALATION TREATMENT: CPT

## 2018-01-01 PROCEDURE — 81003 URINALYSIS AUTO W/O SCOPE: CPT | Performed by: FAMILY MEDICINE

## 2018-01-01 PROCEDURE — 83605 ASSAY OF LACTIC ACID: CPT | Performed by: FAMILY MEDICINE

## 2018-01-01 PROCEDURE — 99213 OFFICE O/P EST LOW 20 MIN: CPT | Performed by: FAMILY MEDICINE

## 2018-01-01 PROCEDURE — 94799 UNLISTED PULMONARY SVC/PX: CPT

## 2018-01-01 PROCEDURE — 90662 IIV NO PRSV INCREASED AG IM: CPT | Performed by: FAMILY MEDICINE

## 2018-01-01 PROCEDURE — 99495 TRANSJ CARE MGMT MOD F2F 14D: CPT | Performed by: FAMILY MEDICINE

## 2018-01-01 PROCEDURE — 85379 FIBRIN DEGRADATION QUANT: CPT | Performed by: FAMILY MEDICINE

## 2018-01-01 PROCEDURE — 99214 OFFICE O/P EST MOD 30 MIN: CPT | Performed by: FAMILY MEDICINE

## 2018-01-01 PROCEDURE — 71275 CT ANGIOGRAPHY CHEST: CPT

## 2018-01-01 PROCEDURE — 83735 ASSAY OF MAGNESIUM: CPT | Performed by: EMERGENCY MEDICINE

## 2018-01-01 PROCEDURE — 99232 SBSQ HOSP IP/OBS MODERATE 35: CPT | Performed by: INTERNAL MEDICINE

## 2018-01-01 PROCEDURE — 97161 PT EVAL LOW COMPLEX 20 MIN: CPT

## 2018-01-01 PROCEDURE — 93010 ELECTROCARDIOGRAM REPORT: CPT | Performed by: INTERNAL MEDICINE

## 2018-01-01 PROCEDURE — 84484 ASSAY OF TROPONIN QUANT: CPT | Performed by: FAMILY MEDICINE

## 2018-01-01 PROCEDURE — 82962 GLUCOSE BLOOD TEST: CPT

## 2018-01-01 PROCEDURE — G8979 MOBILITY GOAL STATUS: HCPCS

## 2018-01-01 PROCEDURE — 85025 COMPLETE CBC W/AUTO DIFF WBC: CPT | Performed by: EMERGENCY MEDICINE

## 2018-01-01 PROCEDURE — 99222 1ST HOSP IP/OBS MODERATE 55: CPT | Performed by: INTERNAL MEDICINE

## 2018-01-01 PROCEDURE — G8978 MOBILITY CURRENT STATUS: HCPCS

## 2018-01-01 PROCEDURE — 25010000002 CYANOCOBALAMIN PER 1000 MCG: Performed by: FAMILY MEDICINE

## 2018-01-01 PROCEDURE — 25010000002 FUROSEMIDE PER 20 MG: Performed by: FAMILY MEDICINE

## 2018-01-01 PROCEDURE — 85025 COMPLETE CBC W/AUTO DIFF WBC: CPT | Performed by: FAMILY MEDICINE

## 2018-01-01 PROCEDURE — 71046 X-RAY EXAM CHEST 2 VIEWS: CPT

## 2018-01-01 PROCEDURE — G0008 ADMIN INFLUENZA VIRUS VAC: HCPCS | Performed by: FAMILY MEDICINE

## 2018-01-01 PROCEDURE — 83880 ASSAY OF NATRIURETIC PEPTIDE: CPT | Performed by: EMERGENCY MEDICINE

## 2018-01-01 PROCEDURE — 80053 COMPREHEN METABOLIC PANEL: CPT | Performed by: EMERGENCY MEDICINE

## 2018-01-01 PROCEDURE — 25010000002 ENOXAPARIN PER 10 MG: Performed by: FAMILY MEDICINE

## 2018-01-01 PROCEDURE — 99238 HOSP IP/OBS DSCHRG MGMT 30/<: CPT | Performed by: FAMILY MEDICINE

## 2018-01-01 PROCEDURE — 25010000002 PERFLUTREN 6.52 MG/ML SUSPENSION: Performed by: FAMILY MEDICINE

## 2018-01-01 PROCEDURE — 36415 COLL VENOUS BLD VENIPUNCTURE: CPT | Performed by: FAMILY MEDICINE

## 2018-01-01 PROCEDURE — 99232 SBSQ HOSP IP/OBS MODERATE 35: CPT | Performed by: FAMILY MEDICINE

## 2018-01-01 PROCEDURE — 99284 EMERGENCY DEPT VISIT MOD MDM: CPT

## 2018-01-01 PROCEDURE — 80053 COMPREHEN METABOLIC PANEL: CPT | Performed by: FAMILY MEDICINE

## 2018-01-01 PROCEDURE — 93005 ELECTROCARDIOGRAM TRACING: CPT

## 2018-01-01 PROCEDURE — 94760 N-INVAS EAR/PLS OXIMETRY 1: CPT

## 2018-01-01 PROCEDURE — 99233 SBSQ HOSP IP/OBS HIGH 50: CPT | Performed by: INTERNAL MEDICINE

## 2018-01-01 PROCEDURE — 0 IOPAMIDOL PER 1 ML: Performed by: EMERGENCY MEDICINE

## 2018-01-01 PROCEDURE — 99222 1ST HOSP IP/OBS MODERATE 55: CPT | Performed by: FAMILY MEDICINE

## 2018-01-01 PROCEDURE — 63710000001 PREDNISONE PER 5 MG: Performed by: FAMILY MEDICINE

## 2018-01-01 PROCEDURE — 69210 REMOVE IMPACTED EAR WAX UNI: CPT | Performed by: NURSE PRACTITIONER

## 2018-01-01 PROCEDURE — 82803 BLOOD GASES ANY COMBINATION: CPT

## 2018-01-01 PROCEDURE — 93005 ELECTROCARDIOGRAM TRACING: CPT | Performed by: EMERGENCY MEDICINE

## 2018-01-01 PROCEDURE — 84484 ASSAY OF TROPONIN QUANT: CPT | Performed by: EMERGENCY MEDICINE

## 2018-01-01 PROCEDURE — 85007 BL SMEAR W/DIFF WBC COUNT: CPT | Performed by: FAMILY MEDICINE

## 2018-01-01 PROCEDURE — 87040 BLOOD CULTURE FOR BACTERIA: CPT | Performed by: FAMILY MEDICINE

## 2018-01-01 PROCEDURE — 83880 ASSAY OF NATRIURETIC PEPTIDE: CPT | Performed by: FAMILY MEDICINE

## 2018-01-01 PROCEDURE — 96374 THER/PROPH/DIAG INJ IV PUSH: CPT

## 2018-01-01 PROCEDURE — 25010000002 FUROSEMIDE PER 20 MG: Performed by: EMERGENCY MEDICINE

## 2018-01-01 PROCEDURE — 71045 X-RAY EXAM CHEST 1 VIEW: CPT

## 2018-01-01 PROCEDURE — 93306 TTE W/DOPPLER COMPLETE: CPT

## 2018-01-01 PROCEDURE — 99285 EMERGENCY DEPT VISIT HI MDM: CPT

## 2018-01-01 RX ORDER — PROPRANOLOL HYDROCHLORIDE 40 MG/1
40 TABLET ORAL 3 TIMES DAILY
Status: DISCONTINUED | OUTPATIENT
Start: 2018-01-01 | End: 2018-01-01 | Stop reason: HOSPADM

## 2018-01-01 RX ORDER — GLIMEPIRIDE 4 MG/1
TABLET ORAL
Qty: 90 TABLET | Refills: 1 | Status: SHIPPED | OUTPATIENT
Start: 2018-01-01 | End: 2019-01-01 | Stop reason: SDUPTHER

## 2018-01-01 RX ORDER — GLIPIZIDE 5 MG/1
2.5 TABLET ORAL
Status: DISCONTINUED | OUTPATIENT
Start: 2018-01-01 | End: 2018-01-01 | Stop reason: HOSPADM

## 2018-01-01 RX ORDER — PREDNISONE 20 MG/1
TABLET ORAL
Qty: 10 TABLET | Refills: 0 | Status: ON HOLD | OUTPATIENT
Start: 2018-01-01 | End: 2018-01-01

## 2018-01-01 RX ORDER — PROPRANOLOL HYDROCHLORIDE 40 MG/1
TABLET ORAL
Qty: 270 TABLET | Refills: 1 | Status: SHIPPED | OUTPATIENT
Start: 2018-01-01 | End: 2019-01-01 | Stop reason: SDUPTHER

## 2018-01-01 RX ORDER — POTASSIUM CHLORIDE 750 MG/1
10 CAPSULE, EXTENDED RELEASE ORAL DAILY
Status: DISCONTINUED | OUTPATIENT
Start: 2018-01-01 | End: 2018-01-01

## 2018-01-01 RX ORDER — FUROSEMIDE 20 MG/1
TABLET ORAL
Qty: 90 TABLET | Refills: 3 | Status: SHIPPED | OUTPATIENT
Start: 2018-01-01

## 2018-01-01 RX ORDER — METHYLPREDNISOLONE SODIUM SUCCINATE 125 MG/2ML
125 INJECTION, POWDER, LYOPHILIZED, FOR SOLUTION INTRAMUSCULAR; INTRAVENOUS ONCE
Status: COMPLETED | OUTPATIENT
Start: 2018-01-01 | End: 2018-01-01

## 2018-01-01 RX ORDER — SODIUM CHLORIDE 0.9 % (FLUSH) 0.9 %
10 SYRINGE (ML) INJECTION AS NEEDED
Status: DISCONTINUED | OUTPATIENT
Start: 2018-01-01 | End: 2018-01-01

## 2018-01-01 RX ORDER — DOCUSATE SODIUM 100 MG/1
100 CAPSULE, LIQUID FILLED ORAL 2 TIMES DAILY PRN
Status: DISCONTINUED | OUTPATIENT
Start: 2018-01-01 | End: 2018-01-01 | Stop reason: HOSPADM

## 2018-01-01 RX ORDER — POTASSIUM CHLORIDE 750 MG/1
10 CAPSULE, EXTENDED RELEASE ORAL 2 TIMES DAILY
Start: 2018-01-01 | End: 2018-01-01 | Stop reason: SDUPTHER

## 2018-01-01 RX ORDER — LEVOFLOXACIN 750 MG/1
750 TABLET ORAL DAILY
Qty: 7 TABLET | Refills: 0 | Status: ON HOLD | OUTPATIENT
Start: 2018-01-01 | End: 2018-01-01

## 2018-01-01 RX ORDER — AMLODIPINE BESYLATE 5 MG/1
TABLET ORAL
Qty: 180 TABLET | Refills: 1 | Status: SHIPPED | OUTPATIENT
Start: 2018-01-01 | End: 2019-01-01 | Stop reason: SDUPTHER

## 2018-01-01 RX ORDER — RANITIDINE 150 MG/1
TABLET ORAL
Qty: 180 TABLET | Refills: 1 | Status: SHIPPED | OUTPATIENT
Start: 2018-01-01 | End: 2019-01-01 | Stop reason: SDUPTHER

## 2018-01-01 RX ORDER — FUROSEMIDE 20 MG/1
20 TABLET ORAL DAILY
Qty: 30 TABLET | Refills: 5 | Status: SHIPPED | OUTPATIENT
Start: 2018-01-01 | End: 2018-01-01 | Stop reason: SDUPTHER

## 2018-01-01 RX ORDER — DEXTROSE MONOHYDRATE 25 G/50ML
25 INJECTION, SOLUTION INTRAVENOUS
Status: DISCONTINUED | OUTPATIENT
Start: 2018-01-01 | End: 2018-01-01 | Stop reason: HOSPADM

## 2018-01-01 RX ORDER — METFORMIN HYDROCHLORIDE 500 MG/1
TABLET, EXTENDED RELEASE ORAL
Qty: 180 TABLET | Refills: 1 | Status: SHIPPED | OUTPATIENT
Start: 2018-01-01 | End: 2019-01-01 | Stop reason: SDUPTHER

## 2018-01-01 RX ORDER — FUROSEMIDE 10 MG/ML
20 INJECTION INTRAMUSCULAR; INTRAVENOUS ONCE
Status: COMPLETED | OUTPATIENT
Start: 2018-01-01 | End: 2018-01-01

## 2018-01-01 RX ORDER — LEVOFLOXACIN 250 MG/1
250 TABLET ORAL EVERY 24 HOURS
Status: DISCONTINUED | OUTPATIENT
Start: 2018-01-01 | End: 2018-01-01 | Stop reason: HOSPADM

## 2018-01-01 RX ORDER — PROPRANOLOL HYDROCHLORIDE 40 MG/1
40 TABLET ORAL 3 TIMES DAILY
COMMUNITY
End: 2018-01-01 | Stop reason: SDUPTHER

## 2018-01-01 RX ORDER — CYANOCOBALAMIN 1000 UG/ML
1000 INJECTION, SOLUTION INTRAMUSCULAR; SUBCUTANEOUS
Status: SHIPPED | OUTPATIENT
Start: 2018-01-01

## 2018-01-01 RX ORDER — RANITIDINE 150 MG/1
150 TABLET ORAL 2 TIMES DAILY
COMMUNITY
End: 2018-01-01 | Stop reason: SDUPTHER

## 2018-01-01 RX ORDER — LOSARTAN POTASSIUM 100 MG/1
100 TABLET ORAL DAILY
COMMUNITY
End: 2018-01-01 | Stop reason: SDUPTHER

## 2018-01-01 RX ORDER — GLIMEPIRIDE 4 MG/1
4 TABLET ORAL
COMMUNITY
End: 2018-01-01 | Stop reason: SDUPTHER

## 2018-01-01 RX ORDER — LOSARTAN POTASSIUM 50 MG/1
100 TABLET ORAL DAILY
Status: DISCONTINUED | OUTPATIENT
Start: 2018-01-01 | End: 2018-01-01 | Stop reason: HOSPADM

## 2018-01-01 RX ORDER — DULOXETIN HYDROCHLORIDE 30 MG/1
30 CAPSULE, DELAYED RELEASE ORAL DAILY
COMMUNITY
End: 2018-01-01 | Stop reason: SDUPTHER

## 2018-01-01 RX ORDER — IPRATROPIUM BROMIDE AND ALBUTEROL SULFATE 2.5; .5 MG/3ML; MG/3ML
3 SOLUTION RESPIRATORY (INHALATION) ONCE
Status: COMPLETED | OUTPATIENT
Start: 2018-01-01 | End: 2018-01-01

## 2018-01-01 RX ORDER — POTASSIUM CHLORIDE 750 MG/1
CAPSULE, EXTENDED RELEASE ORAL
Qty: 60 CAPSULE | Refills: 3 | Status: SHIPPED | OUTPATIENT
Start: 2018-01-01 | End: 2019-01-01 | Stop reason: SDUPTHER

## 2018-01-01 RX ORDER — IPRATROPIUM BROMIDE AND ALBUTEROL SULFATE 2.5; .5 MG/3ML; MG/3ML
3 SOLUTION RESPIRATORY (INHALATION)
Status: DISCONTINUED | OUTPATIENT
Start: 2018-01-01 | End: 2018-01-01 | Stop reason: HOSPADM

## 2018-01-01 RX ORDER — DULOXETIN HYDROCHLORIDE 30 MG/1
CAPSULE, DELAYED RELEASE ORAL
Qty: 90 CAPSULE | Refills: 1 | Status: SHIPPED | OUTPATIENT
Start: 2018-01-01 | End: 2019-01-01 | Stop reason: SDUPTHER

## 2018-01-01 RX ORDER — POTASSIUM CHLORIDE 750 MG/1
20 CAPSULE, EXTENDED RELEASE ORAL 2 TIMES DAILY WITH MEALS
Status: DISCONTINUED | OUTPATIENT
Start: 2018-01-01 | End: 2018-01-01 | Stop reason: HOSPADM

## 2018-01-01 RX ORDER — MULTIVIT,CALC,MINS/IRON/FOLIC 9MG-400MCG
1 TABLET ORAL DAILY
Status: DISCONTINUED | OUTPATIENT
Start: 2018-01-01 | End: 2018-01-01 | Stop reason: HOSPADM

## 2018-01-01 RX ORDER — ATORVASTATIN CALCIUM 10 MG/1
10 TABLET, FILM COATED ORAL NIGHTLY
COMMUNITY
End: 2018-01-01 | Stop reason: SDUPTHER

## 2018-01-01 RX ORDER — PREDNISONE 10 MG/1
10 TABLET ORAL
Status: DISCONTINUED | OUTPATIENT
Start: 2018-01-01 | End: 2018-01-01 | Stop reason: HOSPADM

## 2018-01-01 RX ORDER — FUROSEMIDE 20 MG/1
20 TABLET ORAL DAILY
Status: DISCONTINUED | OUTPATIENT
Start: 2018-01-01 | End: 2018-01-01 | Stop reason: HOSPADM

## 2018-01-01 RX ORDER — POTASSIUM CHLORIDE 750 MG/1
10 CAPSULE, EXTENDED RELEASE ORAL 2 TIMES DAILY
Qty: 60 CAPSULE | Refills: 5 | Status: SHIPPED | OUTPATIENT
Start: 2018-01-01 | End: 2018-01-01 | Stop reason: SDUPTHER

## 2018-01-01 RX ORDER — CYANOCOBALAMIN 1000 UG/ML
1000 INJECTION, SOLUTION INTRAMUSCULAR; SUBCUTANEOUS
Status: DISCONTINUED | OUTPATIENT
Start: 2018-01-01 | End: 2018-01-01 | Stop reason: HOSPADM

## 2018-01-01 RX ORDER — SITAGLIPTIN 100 MG/1
TABLET, FILM COATED ORAL
Qty: 90 TABLET | Refills: 1 | Status: SHIPPED | OUTPATIENT
Start: 2018-01-01 | End: 2019-01-01 | Stop reason: SDUPTHER

## 2018-01-01 RX ORDER — DULOXETIN HYDROCHLORIDE 30 MG/1
30 CAPSULE, DELAYED RELEASE ORAL DAILY
Status: DISCONTINUED | OUTPATIENT
Start: 2018-01-01 | End: 2018-01-01 | Stop reason: HOSPADM

## 2018-01-01 RX ORDER — SODIUM CHLORIDE 0.9 % (FLUSH) 0.9 %
1-10 SYRINGE (ML) INJECTION AS NEEDED
Status: DISCONTINUED | OUTPATIENT
Start: 2018-01-01 | End: 2018-01-01 | Stop reason: HOSPADM

## 2018-01-01 RX ORDER — AMLODIPINE BESYLATE 5 MG/1
5 TABLET ORAL
Status: DISCONTINUED | OUTPATIENT
Start: 2018-01-01 | End: 2018-01-01 | Stop reason: HOSPADM

## 2018-01-01 RX ORDER — LEVOFLOXACIN 750 MG/1
750 TABLET ORAL DAILY
COMMUNITY
End: 2018-01-01

## 2018-01-01 RX ORDER — FUROSEMIDE 20 MG/1
20 TABLET ORAL DAILY
COMMUNITY
End: 2018-01-01 | Stop reason: SDUPTHER

## 2018-01-01 RX ORDER — PREDNISONE 20 MG/1
20 TABLET ORAL DAILY
Qty: 10 TABLET | Refills: 0 | Status: SHIPPED | OUTPATIENT
Start: 2018-01-01 | End: 2018-01-01

## 2018-01-01 RX ORDER — NICOTINE POLACRILEX 4 MG
15 LOZENGE BUCCAL
Status: DISCONTINUED | OUTPATIENT
Start: 2018-01-01 | End: 2018-01-01 | Stop reason: HOSPADM

## 2018-01-01 RX ORDER — ATORVASTATIN CALCIUM 10 MG/1
10 TABLET, FILM COATED ORAL NIGHTLY
Status: DISCONTINUED | OUTPATIENT
Start: 2018-01-01 | End: 2018-01-01 | Stop reason: HOSPADM

## 2018-01-01 RX ORDER — POTASSIUM CHLORIDE 750 MG/1
10 CAPSULE, EXTENDED RELEASE ORAL DAILY
Status: ON HOLD | COMMUNITY
End: 2018-01-01

## 2018-01-01 RX ORDER — LEVOFLOXACIN 500 MG/1
500 TABLET, FILM COATED ORAL EVERY 24 HOURS
Status: DISCONTINUED | OUTPATIENT
Start: 2018-01-01 | End: 2018-01-01

## 2018-01-01 RX ORDER — LEVOFLOXACIN 5 MG/ML
500 INJECTION, SOLUTION INTRAVENOUS EVERY 24 HOURS
Status: DISCONTINUED | OUTPATIENT
Start: 2018-01-01 | End: 2018-01-01

## 2018-01-01 RX ORDER — AMLODIPINE BESYLATE 5 MG/1
5 TABLET ORAL 2 TIMES DAILY
COMMUNITY
End: 2018-01-01 | Stop reason: SDUPTHER

## 2018-01-01 RX ORDER — PREDNISONE 20 MG/1
40 TABLET ORAL DAILY
Status: ON HOLD | COMMUNITY
End: 2018-01-01

## 2018-01-01 RX ORDER — FUROSEMIDE 10 MG/ML
40 INJECTION INTRAMUSCULAR; INTRAVENOUS ONCE
Status: COMPLETED | OUTPATIENT
Start: 2018-01-01 | End: 2018-01-01

## 2018-01-01 RX ORDER — LOSARTAN POTASSIUM 100 MG/1
TABLET ORAL
Qty: 90 TABLET | Refills: 1 | Status: SHIPPED | OUTPATIENT
Start: 2018-01-01 | End: 2019-01-01 | Stop reason: SDUPTHER

## 2018-01-01 RX ORDER — LEVOFLOXACIN 5 MG/ML
750 INJECTION, SOLUTION INTRAVENOUS ONCE
Status: COMPLETED | OUTPATIENT
Start: 2018-01-01 | End: 2018-01-01

## 2018-01-01 RX ORDER — ATORVASTATIN CALCIUM 10 MG/1
TABLET, FILM COATED ORAL
Qty: 90 TABLET | Refills: 1 | Status: SHIPPED | OUTPATIENT
Start: 2018-01-01 | End: 2019-01-01 | Stop reason: SDUPTHER

## 2018-01-01 RX ORDER — ASPIRIN 81 MG/1
81 TABLET ORAL DAILY
Status: DISCONTINUED | OUTPATIENT
Start: 2018-01-01 | End: 2018-01-01 | Stop reason: HOSPADM

## 2018-01-01 RX ORDER — METFORMIN HYDROCHLORIDE 500 MG/1
1000 TABLET, EXTENDED RELEASE ORAL EVERY EVENING
COMMUNITY
End: 2018-01-01 | Stop reason: SDUPTHER

## 2018-01-01 RX ADMIN — IPRATROPIUM BROMIDE AND ALBUTEROL SULFATE 3 ML: 2.5; .5 SOLUTION RESPIRATORY (INHALATION) at 14:50

## 2018-01-01 RX ADMIN — GLIPIZIDE 2.5 MG: 5 TABLET ORAL at 07:30

## 2018-01-01 RX ADMIN — PROPRANOLOL HYDROCHLORIDE 40 MG: 40 TABLET ORAL at 20:53

## 2018-01-01 RX ADMIN — PROPRANOLOL HYDROCHLORIDE 40 MG: 40 TABLET ORAL at 20:21

## 2018-01-01 RX ADMIN — DULOXETINE HYDROCHLORIDE 30 MG: 30 CAPSULE, DELAYED RELEASE ORAL at 09:00

## 2018-01-01 RX ADMIN — CYANOCOBALAMIN 1000 MCG: 1000 INJECTION, SOLUTION INTRAMUSCULAR at 11:44

## 2018-01-01 RX ADMIN — POTASSIUM CHLORIDE 20 MEQ: 750 CAPSULE, EXTENDED RELEASE ORAL at 09:30

## 2018-01-01 RX ADMIN — POTASSIUM CHLORIDE 20 MEQ: 750 CAPSULE, EXTENDED RELEASE ORAL at 08:36

## 2018-01-01 RX ADMIN — FUROSEMIDE 20 MG: 10 INJECTION, SOLUTION INTRAMUSCULAR; INTRAVENOUS at 12:54

## 2018-01-01 RX ADMIN — LEVOFLOXACIN 500 MG: 5 INJECTION, SOLUTION INTRAVENOUS at 17:09

## 2018-01-01 RX ADMIN — CYANOCOBALAMIN 1000 MCG: 1000 INJECTION, SOLUTION INTRAMUSCULAR; SUBCUTANEOUS at 10:43

## 2018-01-01 RX ADMIN — CYANOCOBALAMIN 1000 MCG: 1000 INJECTION, SOLUTION INTRAMUSCULAR; SUBCUTANEOUS at 09:10

## 2018-01-01 RX ADMIN — AMLODIPINE BESYLATE 5 MG: 5 TABLET ORAL at 09:00

## 2018-01-01 RX ADMIN — LEVOFLOXACIN 250 MG: 250 TABLET, FILM COATED ORAL at 10:07

## 2018-01-01 RX ADMIN — ASPIRIN 81 MG: 81 TABLET ORAL at 08:34

## 2018-01-01 RX ADMIN — INSULIN LISPRO 3 UNITS: 100 INJECTION, SOLUTION INTRAVENOUS; SUBCUTANEOUS at 22:10

## 2018-01-01 RX ADMIN — IPRATROPIUM BROMIDE AND ALBUTEROL SULFATE 3 ML: 2.5; .5 SOLUTION RESPIRATORY (INHALATION) at 11:57

## 2018-01-01 RX ADMIN — DULOXETINE HYDROCHLORIDE 30 MG: 30 CAPSULE, DELAYED RELEASE ORAL at 09:30

## 2018-01-01 RX ADMIN — ENOXAPARIN SODIUM 40 MG: 40 INJECTION SUBCUTANEOUS at 15:10

## 2018-01-01 RX ADMIN — CYANOCOBALAMIN 1000 MCG: 1000 INJECTION, SOLUTION INTRAMUSCULAR; SUBCUTANEOUS at 12:03

## 2018-01-01 RX ADMIN — Medication 1 TABLET: at 09:30

## 2018-01-01 RX ADMIN — LOSARTAN POTASSIUM 100 MG: 50 TABLET, FILM COATED ORAL at 09:00

## 2018-01-01 RX ADMIN — ASPIRIN 81 MG: 81 TABLET ORAL at 09:30

## 2018-01-01 RX ADMIN — IPRATROPIUM BROMIDE AND ALBUTEROL SULFATE 3 ML: 2.5; .5 SOLUTION RESPIRATORY (INHALATION) at 11:36

## 2018-01-01 RX ADMIN — AMLODIPINE BESYLATE 5 MG: 5 TABLET ORAL at 08:10

## 2018-01-01 RX ADMIN — IPRATROPIUM BROMIDE AND ALBUTEROL SULFATE 3 ML: 2.5; .5 SOLUTION RESPIRATORY (INHALATION) at 16:07

## 2018-01-01 RX ADMIN — PROPRANOLOL HYDROCHLORIDE 40 MG: 40 TABLET ORAL at 15:13

## 2018-01-01 RX ADMIN — CYANOCOBALAMIN 1000 MCG: 1000 INJECTION, SOLUTION INTRAMUSCULAR; SUBCUTANEOUS at 11:54

## 2018-01-01 RX ADMIN — CYANOCOBALAMIN 1000 MCG: 1000 INJECTION, SOLUTION INTRAMUSCULAR; SUBCUTANEOUS at 11:14

## 2018-01-01 RX ADMIN — PROPRANOLOL HYDROCHLORIDE 40 MG: 40 TABLET ORAL at 21:33

## 2018-01-01 RX ADMIN — Medication 1 TABLET: at 08:12

## 2018-01-01 RX ADMIN — CYANOCOBALAMIN 1000 MCG: 1000 INJECTION, SOLUTION INTRAMUSCULAR; SUBCUTANEOUS at 11:00

## 2018-01-01 RX ADMIN — ENOXAPARIN SODIUM 40 MG: 40 INJECTION SUBCUTANEOUS at 15:34

## 2018-01-01 RX ADMIN — PROPRANOLOL HYDROCHLORIDE 40 MG: 40 TABLET ORAL at 20:47

## 2018-01-01 RX ADMIN — GLIPIZIDE 2.5 MG: 5 TABLET ORAL at 09:29

## 2018-01-01 RX ADMIN — AMLODIPINE BESYLATE 5 MG: 5 TABLET ORAL at 08:35

## 2018-01-01 RX ADMIN — IPRATROPIUM BROMIDE AND ALBUTEROL SULFATE 3 ML: 2.5; .5 SOLUTION RESPIRATORY (INHALATION) at 14:56

## 2018-01-01 RX ADMIN — CYANOCOBALAMIN 1000 MCG: 1000 INJECTION, SOLUTION INTRAMUSCULAR; SUBCUTANEOUS at 13:44

## 2018-01-01 RX ADMIN — DULOXETINE HYDROCHLORIDE 30 MG: 30 CAPSULE, DELAYED RELEASE ORAL at 08:34

## 2018-01-01 RX ADMIN — IPRATROPIUM BROMIDE AND ALBUTEROL SULFATE 3 ML: 2.5; .5 SOLUTION RESPIRATORY (INHALATION) at 15:33

## 2018-01-01 RX ADMIN — FUROSEMIDE 20 MG: 20 TABLET ORAL at 08:10

## 2018-01-01 RX ADMIN — FUROSEMIDE 40 MG: 10 INJECTION, SOLUTION INTRAMUSCULAR; INTRAVENOUS at 01:57

## 2018-01-01 RX ADMIN — CYANOCOBALAMIN 1000 MCG: 1000 INJECTION, SOLUTION INTRAMUSCULAR; SUBCUTANEOUS at 11:07

## 2018-01-01 RX ADMIN — PREDNISONE 10 MG: 10 TABLET ORAL at 09:30

## 2018-01-01 RX ADMIN — IPRATROPIUM BROMIDE AND ALBUTEROL SULFATE 3 ML: 2.5; .5 SOLUTION RESPIRATORY (INHALATION) at 21:01

## 2018-01-01 RX ADMIN — CYANOCOBALAMIN 1000 MCG: 1000 INJECTION, SOLUTION INTRAMUSCULAR; SUBCUTANEOUS at 11:01

## 2018-01-01 RX ADMIN — PROPRANOLOL HYDROCHLORIDE 40 MG: 40 TABLET ORAL at 19:13

## 2018-01-01 RX ADMIN — LOSARTAN POTASSIUM 100 MG: 50 TABLET, FILM COATED ORAL at 09:30

## 2018-01-01 RX ADMIN — GLIPIZIDE 2.5 MG: 5 TABLET ORAL at 08:37

## 2018-01-01 RX ADMIN — PROPRANOLOL HYDROCHLORIDE 40 MG: 40 TABLET ORAL at 08:36

## 2018-01-01 RX ADMIN — FUROSEMIDE 20 MG: 20 TABLET ORAL at 09:30

## 2018-01-01 RX ADMIN — LINAGLIPTIN 5 MG: 5 TABLET, FILM COATED ORAL at 09:30

## 2018-01-01 RX ADMIN — PROPRANOLOL HYDROCHLORIDE 40 MG: 40 TABLET ORAL at 09:00

## 2018-01-01 RX ADMIN — FUROSEMIDE 20 MG: 10 INJECTION, SOLUTION INTRAVENOUS at 11:44

## 2018-01-01 RX ADMIN — ASPIRIN 81 MG: 81 TABLET ORAL at 09:00

## 2018-01-01 RX ADMIN — ATORVASTATIN CALCIUM 10 MG: 10 TABLET, FILM COATED ORAL at 20:21

## 2018-01-01 RX ADMIN — LEVOFLOXACIN 250 MG: 250 TABLET, FILM COATED ORAL at 09:30

## 2018-01-01 RX ADMIN — IOPAMIDOL 76 ML: 755 INJECTION, SOLUTION INTRAVENOUS at 01:31

## 2018-01-01 RX ADMIN — DULOXETINE HYDROCHLORIDE 30 MG: 30 CAPSULE, DELAYED RELEASE ORAL at 08:11

## 2018-01-01 RX ADMIN — PERFLUTREN 8.48 MG: 6.52 INJECTION, SUSPENSION INTRAVENOUS at 17:47

## 2018-01-01 RX ADMIN — ATORVASTATIN CALCIUM 10 MG: 10 TABLET, FILM COATED ORAL at 20:47

## 2018-01-01 RX ADMIN — Medication 1 TABLET: at 09:00

## 2018-01-01 RX ADMIN — ASPIRIN 81 MG: 81 TABLET ORAL at 08:10

## 2018-01-01 RX ADMIN — IPRATROPIUM BROMIDE AND ALBUTEROL SULFATE 3 ML: 2.5; .5 SOLUTION RESPIRATORY (INHALATION) at 12:13

## 2018-01-01 RX ADMIN — ENOXAPARIN SODIUM 40 MG: 40 INJECTION SUBCUTANEOUS at 13:36

## 2018-01-01 RX ADMIN — METHYLPREDNISOLONE SODIUM SUCCINATE 125 MG: 125 INJECTION, POWDER, FOR SOLUTION INTRAMUSCULAR; INTRAVENOUS at 00:01

## 2018-01-01 RX ADMIN — LEVOFLOXACIN 750 MG: 5 INJECTION, SOLUTION INTRAVENOUS at 13:32

## 2018-01-01 RX ADMIN — AMLODIPINE BESYLATE 5 MG: 5 TABLET ORAL at 09:30

## 2018-01-01 RX ADMIN — PROPRANOLOL HYDROCHLORIDE 40 MG: 40 TABLET ORAL at 09:30

## 2018-01-01 RX ADMIN — PROPRANOLOL HYDROCHLORIDE 40 MG: 40 TABLET ORAL at 16:15

## 2018-01-01 RX ADMIN — CYANOCOBALAMIN 1000 MCG: 1000 INJECTION, SOLUTION INTRAMUSCULAR; SUBCUTANEOUS at 11:33

## 2018-01-01 RX ADMIN — LINAGLIPTIN 5 MG: 5 TABLET, FILM COATED ORAL at 09:00

## 2018-01-01 RX ADMIN — POTASSIUM CHLORIDE 20 MEQ: 750 CAPSULE, EXTENDED RELEASE ORAL at 17:02

## 2018-01-01 RX ADMIN — LINAGLIPTIN 5 MG: 5 TABLET, FILM COATED ORAL at 08:08

## 2018-01-01 RX ADMIN — Medication 1 TABLET: at 08:34

## 2018-01-01 RX ADMIN — POTASSIUM CHLORIDE 20 MEQ: 750 CAPSULE, EXTENDED RELEASE ORAL at 19:13

## 2018-01-01 RX ADMIN — ATORVASTATIN CALCIUM 10 MG: 10 TABLET, FILM COATED ORAL at 21:34

## 2018-01-01 RX ADMIN — POTASSIUM CHLORIDE 20 MEQ: 750 CAPSULE, EXTENDED RELEASE ORAL at 08:00

## 2018-01-01 RX ADMIN — PROPRANOLOL HYDROCHLORIDE 40 MG: 40 TABLET ORAL at 15:05

## 2018-01-01 RX ADMIN — IPRATROPIUM BROMIDE AND ALBUTEROL SULFATE 3 ML: 2.5; .5 SOLUTION RESPIRATORY (INHALATION) at 07:25

## 2018-01-01 RX ADMIN — INSULIN LISPRO 2 UNITS: 100 INJECTION, SOLUTION INTRAVENOUS; SUBCUTANEOUS at 12:20

## 2018-01-01 RX ADMIN — IPRATROPIUM BROMIDE AND ALBUTEROL SULFATE 3 ML: 2.5; .5 SOLUTION RESPIRATORY (INHALATION) at 20:52

## 2018-01-01 RX ADMIN — IPRATROPIUM BROMIDE AND ALBUTEROL SULFATE 3 ML: 2.5; .5 SOLUTION RESPIRATORY (INHALATION) at 23:23

## 2018-01-01 RX ADMIN — LINAGLIPTIN 5 MG: 5 TABLET, FILM COATED ORAL at 08:35

## 2018-01-01 RX ADMIN — IPRATROPIUM BROMIDE AND ALBUTEROL SULFATE 3 ML: 2.5; .5 SOLUTION RESPIRATORY (INHALATION) at 10:56

## 2018-01-01 RX ADMIN — Medication 1 TABLET: at 15:05

## 2018-01-01 RX ADMIN — POTASSIUM CHLORIDE 20 MEQ: 750 CAPSULE, EXTENDED RELEASE ORAL at 08:12

## 2018-01-01 RX ADMIN — IPRATROPIUM BROMIDE AND ALBUTEROL SULFATE 3 ML: 2.5; .5 SOLUTION RESPIRATORY (INHALATION) at 20:01

## 2018-01-01 RX ADMIN — IPRATROPIUM BROMIDE AND ALBUTEROL SULFATE 3 ML: 2.5; .5 SOLUTION RESPIRATORY (INHALATION) at 07:15

## 2018-01-01 RX ADMIN — PREDNISONE 10 MG: 10 TABLET ORAL at 08:12

## 2018-01-01 RX ADMIN — INSULIN LISPRO 5 UNITS: 100 INJECTION, SOLUTION INTRAVENOUS; SUBCUTANEOUS at 17:06

## 2018-01-01 RX ADMIN — PROPRANOLOL HYDROCHLORIDE 40 MG: 40 TABLET ORAL at 08:13

## 2018-01-01 RX ADMIN — LOSARTAN POTASSIUM 100 MG: 50 TABLET, FILM COATED ORAL at 08:37

## 2018-01-01 RX ADMIN — IPRATROPIUM BROMIDE AND ALBUTEROL SULFATE 3 ML: 2.5; .5 SOLUTION RESPIRATORY (INHALATION) at 10:47

## 2018-01-01 RX ADMIN — CYANOCOBALAMIN 1000 MCG: 1000 INJECTION, SOLUTION INTRAMUSCULAR; SUBCUTANEOUS at 15:30

## 2018-01-01 RX ADMIN — LOSARTAN POTASSIUM 100 MG: 50 TABLET, FILM COATED ORAL at 08:11

## 2018-01-01 RX ADMIN — POTASSIUM CHLORIDE 20 MEQ: 750 CAPSULE, EXTENDED RELEASE ORAL at 18:46

## 2018-01-01 RX ADMIN — ATORVASTATIN CALCIUM 10 MG: 10 TABLET, FILM COATED ORAL at 20:53

## 2018-01-01 RX ADMIN — GLIPIZIDE 2.5 MG: 5 TABLET ORAL at 08:11

## 2018-01-19 RX ORDER — LOSARTAN POTASSIUM 100 MG/1
TABLET ORAL
Qty: 90 TABLET | Refills: 1 | Status: ON HOLD | OUTPATIENT
Start: 2018-01-19 | End: 2018-01-01

## 2018-01-19 RX ORDER — PROPRANOLOL HYDROCHLORIDE 40 MG/1
TABLET ORAL
Qty: 270 TABLET | Refills: 1 | Status: ON HOLD | OUTPATIENT
Start: 2018-01-19 | End: 2018-01-01

## 2018-01-19 RX ORDER — FUROSEMIDE 20 MG/1
TABLET ORAL
Qty: 90 TABLET | Refills: 1 | Status: ON HOLD | OUTPATIENT
Start: 2018-01-19 | End: 2018-01-01

## 2018-01-19 RX ORDER — AMLODIPINE BESYLATE 5 MG/1
TABLET ORAL
Qty: 180 TABLET | Refills: 1 | Status: ON HOLD | OUTPATIENT
Start: 2018-01-19 | End: 2018-01-01

## 2018-01-19 RX ORDER — DULOXETIN HYDROCHLORIDE 30 MG/1
CAPSULE, DELAYED RELEASE ORAL
Qty: 90 CAPSULE | Refills: 1 | Status: ON HOLD | OUTPATIENT
Start: 2018-01-19 | End: 2018-01-01

## 2018-01-19 RX ORDER — POTASSIUM CHLORIDE 750 MG/1
CAPSULE, EXTENDED RELEASE ORAL
Qty: 90 CAPSULE | Refills: 1 | Status: ON HOLD | OUTPATIENT
Start: 2018-01-19 | End: 2018-01-01

## 2018-01-19 RX ORDER — RANITIDINE 150 MG/1
TABLET ORAL
Qty: 180 TABLET | Refills: 1 | Status: ON HOLD | OUTPATIENT
Start: 2018-01-19 | End: 2018-01-01

## 2018-01-19 RX ORDER — ATORVASTATIN CALCIUM 10 MG/1
TABLET, FILM COATED ORAL
Qty: 90 TABLET | Refills: 1 | Status: ON HOLD | OUTPATIENT
Start: 2018-01-19 | End: 2018-01-01

## 2018-01-19 RX ORDER — SITAGLIPTIN 100 MG/1
TABLET, FILM COATED ORAL
Qty: 90 TABLET | Refills: 1 | Status: ON HOLD | OUTPATIENT
Start: 2018-01-19 | End: 2018-01-01

## 2018-01-19 RX ORDER — GLIMEPIRIDE 4 MG/1
TABLET ORAL
Qty: 90 TABLET | Refills: 1 | Status: ON HOLD | OUTPATIENT
Start: 2018-01-19 | End: 2018-01-01

## 2018-01-19 RX ORDER — METFORMIN HYDROCHLORIDE 500 MG/1
TABLET, EXTENDED RELEASE ORAL
Qty: 180 TABLET | Refills: 1 | Status: ON HOLD | OUTPATIENT
Start: 2018-01-19 | End: 2018-01-01

## 2018-03-15 NOTE — PROGRESS NOTES
"Subjective   Kortney Henson is a 89 y.o. female presenting with chief complaint of:   Chief Complaint   Patient presents with   • Diabetes   • Anemia   • Ear Fullness     \"feels like I have water in them\"   • Hyperlipidemia   • Hypertension       History of Present Illness :  With daugher.   Has multiple chronic problems to consider that might have a bearing on today's issues; an interval appointment.       1. Essential hypertension -long term/chronic.  Response suggest essential.  No home monitoring   2. Depression, unspecified depression type -doing well/ denies suicidial ideation   3. Anemia, unspecified type -post op affected past.  Colonoscopy 2000?; but with her age declines neg   4. Gait difficulty -no falls; gradually worse   5. Hyperlipidemia, unspecified hyperlipidemia type -comorbids for which lipitor felt important       acute Excessive cerumen in ear canal, unspecified laterality -acutely cannot hear well bilaterally       Has an/another acute issue today: ear wax above.    The following portions of the patient's history were reviewed and updated as appropriate: allergies, current medications, past family history, past medical history, past social history, past surgical history and problem list.  Records acquired and reviewed; TCC migrated.      Current Outpatient Prescriptions:   •  amLODIPine (NORVASC) 5 MG tablet, TAKE ONE TABLET TWICE DAILY GENERIC FOR NORVASC, Disp: 180 tablet, Rfl: 1  •  aspirin 81 MG EC tablet, Take 81 mg by mouth Daily., Disp: , Rfl:   •  atorvastatin (LIPITOR) 10 MG tablet, TAKE ONE TABLET AT BEDTIME GENERIC FOR LIPITOR, Disp: 90 tablet, Rfl: 1  •  Calcium Carb-Cholecalciferol (CALCIUM-VITAMIN D) 600-400 MG-UNIT tablet, Take  by mouth 2 (Two) Times a Day., Disp: , Rfl:   •  docusate sodium (COLACE) 100 MG capsule, Take 100 mg by mouth 2 (Two) Times a Day As Needed for constipation., Disp: , Rfl:   •  DULoxetine (CYMBALTA) 30 MG capsule, TAKE 1 CAPSULE DAILY GENERIC FOR CYMBALTA, " Disp: 90 capsule, Rfl: 1  •  furosemide (LASIX) 20 MG tablet, TAKE 1 TABLET DAILY GENERIC FOR LASIX, Disp: 90 tablet, Rfl: 1  •  glimepiride (AMARYL) 4 MG tablet, TAKE ONE TABLET EVERY MORNING GENERIC FOR AMARYL, Disp: 90 tablet, Rfl: 1  •  glucose blood (COOL BLOOD GLUCOSE TEST STRIPS) test strip, USE AS DIRECTED DAILY, Disp: 100 each, Rfl: 5  •  JANUVIA 100 MG tablet, TAKE ONE TABLET DAILY, Disp: 90 tablet, Rfl: 1  •  losartan (COZAAR) 100 MG tablet, TAKE ONE TABLET DAILY GENERIC FOR COZAAR, Disp: 90 tablet, Rfl: 1  •  metFORMIN ER (GLUCOPHAGE-XR) 500 MG 24 hr tablet, TAKE  2 TABLETS DAILY WITH EVENING MEAL GENERIC FOR GLUCOPHAGE ER( DOSE IN  REHAB CENTER), Disp: 180 tablet, Rfl: 1  •  O2 (OXYGEN), Inhale 2 L/min Every Night. Per nasal cannula, Disp: , Rfl:   •  potassium chloride (MICRO-K) 10 MEQ CR capsule, TAKE 1 CAPSULE DAILY (WITH LASIX), Disp: 90 capsule, Rfl: 1  •  propranolol (INDERAL) 40 MG tablet, TAKE ONE TABLET THREE TIMES DAILY GENERIC FOR INDERAL, Disp: 270 tablet, Rfl: 1  •  raNITIdine (ZANTAC) 150 MG tablet, TAKE ONE TABLET TWICE DAILY GENERIC FOR ZANTAC, Disp: 180 tablet, Rfl: 1  •  teriparatide (FORTEO) 600 MCG/2.4ML injection, Inject 20 mcg under the skin Daily. Dr Magana, Disp: , Rfl:   •  Multiple Vitamins-Minerals (WOMENS MULTI VITAMIN & MINERAL) tablet, Take  by mouth Daily., Disp: , Rfl:     No problems with medications.  Refills if needed done    Allergies   Allergen Reactions   • Penicillins Swelling and Rash       Review of Systems  GENERAL:  Inactive/slower with limits, speed, stamina for age and desire. Sleep is ok. No fever now.  SKIN: No rash/skin lesion of concern:   ENDO:  No syncope, near or diaphoretic sweaty spells.  HEENT: No recent head injury;  headache,  No vision change, Worse than usual; significant hearing loss.  Ears without pain/drainage.  No sore throat.  No significant nasal/sinus congestion/drainage. No epistaxis.  CHEST: No chest wall tenderness or mass. No  cough,  without wheeze.  No SOB; no hemoptysis.  CV: No chest pain, palpitations, ankle edema.  GI: No heartburn, dysphagia.  No abdominal pain, diarrhea, constipation.  No rectal bleeding, or melena.    :  Voids without dysuria, or  incontinence to completion.  ORTHO: No painful/swollen joints but various on /off sore.  No major sore neck or back.  No acute neck or back pain without recent injury.  NEURO: No dizziness, weakness of extremities.  No numbness/paresthesias.   PSYCH: ? mild memory loss.  Mood good; not anxious, depressed but/and not suicidal.  Tries to tolerate stress .     Results for orders placed or performed in visit on 08/18/17   Comprehensive Metabolic Panel   Result Value Ref Range    Glucose 230 (H) 70 - 100 mg/dL    BUN 21 5 - 21 mg/dL    Creatinine 0.71 0.50 - 1.40 mg/dL    eGFR Non African Am 77 >60 mL/min/1.73    eGFR African Am 94 >60 mL/min/1.73    BUN/Creatinine Ratio 29.6 (H) 7.0 - 25.0    Sodium 142 135 - 145 mmol/L    Potassium 4.4 3.5 - 5.3 mmol/L    Chloride 103 98 - 110 mmol/L    Total CO2 26.0 24.0 - 31.0 mmol/L    Calcium 10.0 8.4 - 10.4 mg/dL    Total Protein 7.1 6.3 - 8.7 g/dL    Albumin 4.30 3.50 - 5.00 g/dL    Globulin 2.8 gm/dL    A/G Ratio 1.5 1.1 - 2.5 g/dL    Total Bilirubin 0.7 0.1 - 1.0 mg/dL    Alkaline Phosphatase 67 24 - 120 U/L    AST (SGOT) 26 7 - 45 U/L    ALT (SGPT) 40 0 - 54 U/L   Hemoglobin A1c   Result Value Ref Range    Hemoglobin A1C 7.20 %   CBC & Differential   Result Value Ref Range    WBC 7.13 4.80 - 10.80 10*3/mm3    RBC 4.17 (L) 4.20 - 5.40 10*6/mm3    Hemoglobin 12.3 12.0 - 16.0 g/dL    Hematocrit 39.3 37.0 - 47.0 %    MCV 94.2 82.0 - 98.0 fL    MCH 29.5 28.0 - 32.0 pg    MCHC 31.3 (L) 33.0 - 36.0 g/dL    RDW 14.4 12.0 - 15.0 %    Platelets 256 130 - 400 10*3/mm3    Neutrophil Rel % 72.3 39.0 - 78.0 %    Lymphocyte Rel % 15.6 15.0 - 45.0 %    Monocyte Rel % 9.5 4.0 - 12.0 %    Eosinophil Rel % 2.1 0.0 - 4.0 %    Basophil Rel % 0.4 0.0 - 2.0 %     "Neutrophils Absolute 5.15 1.87 - 8.40 10*3/mm3    Lymphocytes Absolute 1.11 0.72 - 4.86 10*3/mm3    Monocytes Absolute 0.68 0.19 - 1.30 10*3/mm3    Eosinophils Absolute 0.15 0.00 - 0.70 10*3/mm3    Basophils Absolute 0.03 0.00 - 0.20 10*3/mm3    Immature Granulocyte Rel % 0.1 0.0 - 5.0 %    Immature Grans Absolute 0.01 0.00 - 0.03 10*3/mm3       No results found for: PSA     Lab Results:  CBC:  Lab Results - Last 18 Months  Lab Units 08/18/17  1242 02/14/17  0842   WBC 10*3/mm3 7.13 8.91   HEMOGLOBIN g/dL 12.3 12.5   HEMATOCRIT % 39.3 40.8      CMP:  Lab Results - Last 18 Months  Lab Units 08/18/17  1242 02/14/17  0842   SODIUM mmol/L 142 139   CHLORIDE mmol/L 103 99   TOTAL CO2, ARTERIAL mmol/L 26.0 30.0   BUN mg/dL 21 14   CREATININE mg/dL 0.71 0.63   EGFR IF NONAFRICN AM mL/min/1.73 77 89   EGFR IF AFRICN AM mL/min/1.73 94 108   CALCIUM mg/dL 10.0 9.6     HEPATIC:  Lab Results - Last 18 Months  Lab Units 08/18/17  1242 02/14/17  0842   ALT (SGPT) U/L 40 36   AST (SGOT) U/L 26 22     THYROID:  Lab Results - Last 18 Months  Lab Units 02/14/17  0842   TSH mIU/mL 1.380     A1C:  Lab Results - Last 18 Months  Lab Units 08/18/17  1242 02/14/17  0842   HEMOGLOBIN A1C % 7.20 6.90     PSA:No results for input(s): PSA in the last 81203 hours.    Objective   /72 (BP Location: Left arm, Patient Position: Sitting, Cuff Size: Adult)   Pulse 74   Temp 98.5 °F (36.9 °C) (Oral)   Resp 16   Ht 163.8 cm (64.5\")   Wt 59.9 kg (132 lb)   SpO2 97%   BMI 22.31 kg/m²   Body mass index is 22.31 kg/m².    Physical Exam  GENERAL:  Well nourished/developed in no acute distress. But weight loss noticed.  SKIN: Turgor excellent, without wound, rash, lesion  HEENT: Normal cephalic without trauma.  Pupils equal round reactive to light. Extraocular motions full without nystagmus.   External canals nonobstructive nontender without reddness. Tymphatic membranes elza with cristina structures intact.   Oral cavity without growths, " exudates, and moist.  Posterior pharynx without mass, obstruction, redness.  No thyromegaly, mass, tenderness, lymphadenopathy and supple.  CV: Regular rhythm.  No murmur, gallop, trace edema. Posterior pulses intact.  No carotid bruits.  CHEST: No chest wall tenderness or mass.   LUNGS: Symmetric motion with clear to auscultation.  ABD: Soft, nontender without mass.   ORTHO: Symmetric extremities without swelling/point tenderness.  Full gross range of motion.  NEURO: CN 2-12 grossly intact.  Symmetric facies. 1/4 x bicep knee equal reflexes.  UE/LE   3/5 strength throughout.  Nonfocal use extremities. Speech clear.  Reduced light touch with monofilament, vibratory sensation with tuning fork; equal toes/distal feet.    PSYCH: Oriented x 3.  Pleasant calm, well kept.  Purposeful/directed conservation with intact short/long gross memory.     Assessment/Plan     1. Essential hypertension    2. Depression, unspecified depression type    3. Anemia, unspecified type    4. Gait difficulty    5. Hyperlipidemia, unspecified hyperlipidemia type    6. Excessive cerumen in ear canal, unspecified laterality    7. Weight loss    8. Controlled type 2 diabetes mellitus without complication, without long-term current use of insulin        Rx: reviewed/changes:  same    LAB/Testing/Referrals: reviewed/orders:   Orders Placed This Encounter   Procedures   • Comprehensive Metabolic Panel   • Hemoglobin A1c   • Iron Profile   • TSH   • T4, Free   • Vitamin B12   • Folate   • Ambulatory Referral to ENT (Otolaryngology)   • CBC & Differential         Discussions:   Body mass index is 22.31 kg/m².  Discussed the patient's BMI with her. BMI is below normal parameters. Follow-up plan includes:  suggesting watching this and use boost/equal.     Non-smoker    Patient Instructions   Work with   1200 jt/24 hr  60 oz fluid/24 hr      Follow up: Return for lab today and Dr Paniagua-2m.  Future Appointments  Date Time Provider Department Center    3/26/2018 11:00 AM NURSE/NICOLE HEREDIA [facility] None   4/13/2018 9:30 AM FLORIAN Carlson [facility] None   5/10/2018 11:45 AM Luciano Paniagua MD [facility] None

## 2018-03-16 PROBLEM — E53.8 LOW VITAMIN B12 LEVEL: Status: ACTIVE | Noted: 2018-01-01

## 2018-03-16 NOTE — PROGRESS NOTES
Daughter informed of lab results. B12 is low. To start b12 injections weekly times 4, then Monthly. First appt made for B12 injection 3/26/2018

## 2018-03-26 NOTE — PROGRESS NOTES
Pt here for B 12 injection as ordered. Given in RVG site . Pt tolerated well and no adverse reactions noted and pt left office.

## 2018-04-13 PROBLEM — H61.23 BILATERAL IMPACTED CERUMEN: Status: ACTIVE | Noted: 2018-01-01

## 2018-04-13 NOTE — PROGRESS NOTES
PRIMARY CARE PROVIDER: Luciano Paniagua MD  REFERRING PROVIDER: No ref. provider found    Chief Complaint   Patient presents with   • Ear Problem     EAR WAX        Subjective   History of Present Illness:  Kortney Henson is a  89 y.o. female who complains of cerumen accumulation. The symptoms are localized to both ears. The patient has had severe symptoms. The symptoms have been present for the last several months. There have been no identified factors that aggravate the symptoms. There have been no factors that have improved the symptoms. She denies otalgia, otorrhea, dizziness, vertigo, tinnitus, or change in hearing.     Review of Systems:  Review of Systems   Constitutional: Negative for chills and fever.   HENT: Positive for hearing loss. Negative for ear discharge, ear pain, tinnitus, trouble swallowing and voice change.    Musculoskeletal: Positive for gait problem (uses cane).   All other systems reviewed and are negative.      Past History:  Past Medical History:   Diagnosis Date   • Anemia    • CHF (congestive heart failure)    • Diabetes    • Hyperlipidemia    • Hypertension      Past Surgical History:   Procedure Laterality Date   • FEMUR FRACTURE SURGERY       History reviewed. No pertinent family history.  Social History   Substance Use Topics   • Smoking status: Never Smoker   • Smokeless tobacco: Never Used   • Alcohol use No     Allergies:  Penicillins    Current Outpatient Prescriptions:   •  amLODIPine (NORVASC) 5 MG tablet, TAKE ONE TABLET TWICE DAILY GENERIC FOR NORVASC, Disp: 180 tablet, Rfl: 1  •  aspirin 81 MG EC tablet, Take 81 mg by mouth Daily., Disp: , Rfl:   •  atorvastatin (LIPITOR) 10 MG tablet, TAKE ONE TABLET AT BEDTIME GENERIC FOR LIPITOR, Disp: 90 tablet, Rfl: 1  •  Calcium Carb-Cholecalciferol (CALCIUM-VITAMIN D) 600-400 MG-UNIT tablet, Take  by mouth 2 (Two) Times a Day., Disp: , Rfl:   •  docusate sodium (COLACE) 100 MG capsule, Take 100 mg by mouth 2 (Two) Times a Day As  Needed for constipation., Disp: , Rfl:   •  DULoxetine (CYMBALTA) 30 MG capsule, TAKE 1 CAPSULE DAILY GENERIC FOR CYMBALTA, Disp: 90 capsule, Rfl: 1  •  furosemide (LASIX) 20 MG tablet, TAKE 1 TABLET DAILY GENERIC FOR LASIX, Disp: 90 tablet, Rfl: 1  •  glimepiride (AMARYL) 4 MG tablet, TAKE ONE TABLET EVERY MORNING GENERIC FOR AMARYL, Disp: 90 tablet, Rfl: 1  •  glucose blood (COOL BLOOD GLUCOSE TEST STRIPS) test strip, USE AS DIRECTED DAILY, Disp: 100 each, Rfl: 5  •  JANUVIA 100 MG tablet, TAKE ONE TABLET DAILY, Disp: 90 tablet, Rfl: 1  •  losartan (COZAAR) 100 MG tablet, TAKE ONE TABLET DAILY GENERIC FOR COZAAR, Disp: 90 tablet, Rfl: 1  •  metFORMIN ER (GLUCOPHAGE-XR) 500 MG 24 hr tablet, TAKE  2 TABLETS DAILY WITH EVENING MEAL GENERIC FOR GLUCOPHAGE ER( DOSE IN  REHAB CENTER), Disp: 180 tablet, Rfl: 1  •  Multiple Vitamins-Minerals (WOMENS MULTI VITAMIN & MINERAL) tablet, Take  by mouth Daily., Disp: , Rfl:   •  O2 (OXYGEN), Inhale 2 L/min Every Night. Per nasal cannula, Disp: , Rfl:   •  potassium chloride (MICRO-K) 10 MEQ CR capsule, TAKE 1 CAPSULE DAILY (WITH LASIX), Disp: 90 capsule, Rfl: 1  •  propranolol (INDERAL) 40 MG tablet, TAKE ONE TABLET THREE TIMES DAILY GENERIC FOR INDERAL, Disp: 270 tablet, Rfl: 1  •  raNITIdine (ZANTAC) 150 MG tablet, TAKE ONE TABLET TWICE DAILY GENERIC FOR ZANTAC, Disp: 180 tablet, Rfl: 1  •  teriparatide (FORTEO) 600 MCG/2.4ML injection, Inject 20 mcg under the skin Daily. Dr Magana, Disp: , Rfl:     Current Facility-Administered Medications:   •  cyanocobalamin injection 1,000 mcg, 1,000 mcg, Intramuscular, Q30 Days, Luciano Paniagua MD, 1,000 mcg at 04/09/18 1107      Objective     Vital Signs:  Temp:  [97.9 °F (36.6 °C)] 97.9 °F (36.6 °C)  Heart Rate:  [74] 74  Resp:  [20] 20  BP: (127)/(62) 127/62    Physical Exam:  Physical Exam  CONSTITUTIONAL: well nourished, well-developed, alert, oriented, in no acute distress   COMMUNICATION AND VOICE: able to communicate  normally, normal voice quality  HEAD: normocephalic, no lesions, atraumatic, no tenderness, no masses   FACE: appearance normal, no lesions, no tenderness, no deformities, facial motion symmetric  SALIVARY GLANDS: parotid glands with no tenderness, no swelling, no masses, submandibular glands with normal size, nontender  EYES: ocular motility normal, eyelids normal, orbits normal, no proptosis, conjunctiva normal , pupils equal, round   EARS:  Hearing: response to conversational voice normal bilaterally   External Ears: auricles without lesions  Otoscopic: tympanic membrane appearance normal, no lesions, no perforation, normal mobility, no fluid, severe bilateral cerumen impactions removed under microscopy with curette  NOSE:  External Nose: structure normal, no tenderness on palpation, no nasal discharge, no lesions, no evidence of trauma, nostrils patent   ORAL:  Lips: upper and lower lips without lesion   NECK: neck appearance normal, no masses or tenderness   LYMPH NODES: no lymphadenopathy  CHEST/RESPIRATORY: respiratory effort normal, normal breath sounds   CARDIOVASCULAR: rate and rhythm normal, extremities without cyanosis or edema    NEUROLOGIC/PSYCHIATRIC: oriented to time, place and person, mood normal, affect appropriate, CN II-XII intact grossly      PROCEDURE NOTE    LOCATION: [facility]  PROVIDER: TABBY Molina   PREOPERATIVE DIAGNOSIS: Cerumen Impaction bilaterally   POSTOPERATIVE DIAGNOSIS: Same  PROCEDURE: Cerumen removal  ANESTHESIA: None   REASON FOR THE OPERATION: The patient verbally consented to intervention after a full discussion of risks, benefits, and alternatives. No guarantees were made or implied.   DETAILS OF THE OPERATION: The patient was reclined in the procedure room chair. The binocular microscope was used to visualize the ear canal and tympanic membrane. Cerumen was then removed from the both ears using a currette. Findings: bilateral EACs narrow without lesion, bilateral  TMs intact without effusion. Patient tolerated procedure well and was without complications      Assessment   Assessment:  1. Bilateral impacted cerumen        Plan   Plan:    Cerumen impactions removed - see procedure note.  Call for ear drainage, ear pain, fever over 101, or hearing loss. Call for problems or worsening symptoms.     Return if symptoms worsen or fail to improve, for Recheck.    My findings and recommendations were discussed and questions were answered.     Emmy Martinez, APRN  04/13/18  10:38 AM

## 2018-04-14 NOTE — ED PROVIDER NOTES
Subjective   Patient is a 89-year-old female who presents with cough and shortness of breath.  History of diabetes, hypertension, CHF.  Patient notes she has had a cough her entire life but cough is worsening over 2 days.  She notes chest congestion.  Cough has been nonproductive.  Her daughter noted increased respiratory effort today with respirations in the 30s.  She is on 2 L at night.  Denies any leg swelling or hemoptysis.  No history of A. fib according to the patient.  No anticoagulation.  Denies any chest pain.  Denies any fevers.            Review of Systems   Constitutional: Negative for fever.   HENT: Negative for congestion, rhinorrhea and sore throat.    Eyes: Negative for visual disturbance.   Respiratory: Positive for cough and shortness of breath. Negative for wheezing and stridor.    Cardiovascular: Negative for chest pain, palpitations and leg swelling.   Gastrointestinal: Negative for abdominal pain, constipation, diarrhea, nausea and vomiting.   Genitourinary: Negative for hematuria.   Musculoskeletal: Negative for back pain.   Skin: Negative for rash.   Neurological: Negative for syncope and headaches.       Past Medical History:   Diagnosis Date   • Anemia    • CHF (congestive heart failure)    • Diabetes    • Hyperlipidemia    • Hypertension        Allergies   Allergen Reactions   • Penicillins Swelling and Rash       Past Surgical History:   Procedure Laterality Date   • FEMUR FRACTURE SURGERY         History reviewed. No pertinent family history.    Social History     Social History   • Marital status:      Spouse name:    • Number of children: 2   • Years of education: 12     Occupational History   • retired      telephone co/homemaker     Social History Main Topics   • Smoking status: Never Smoker   • Smokeless tobacco: Never Used   • Alcohol use No   • Drug use: No   • Sexual activity: Defer     Other Topics Concern   • Not on file       Lab Results (last 24 hours)      Procedure Component Value Units Date/Time    D-dimer, Quantitative [[id number]]  (Abnormal) Collected:  04/13/18 2048    Specimen:  Blood Updated:  04/13/18 2353     D-Dimer, Quantitative 1.67 (H) mg/L (FEU)     Narrative:       Reference Range is 0-0.50 mg/L FEU. However, results <0.50 mg/L FEU tends to rule out DVT or PE. Results >0.50 mg/L FEU are not useful in predicting absence or presence of DVT or PE.    CBC & Differential [[id number]] Collected:  04/13/18 2049    Specimen:  Blood Updated:  04/13/18 2056    Narrative:       The following orders were created for panel order CBC & Differential.  Procedure                               Abnormality         Status                     ---------                               -----------         ------                     CBC Auto Differential[[id number]]        Abnormal            Final result                 Please view results for these tests on the individual orders.    Comprehensive Metabolic Panel [[id number]]  (Abnormal) Collected:  04/13/18 2049    Specimen:  Blood Updated:  04/13/18 2108     Glucose 263 (H) mg/dL      BUN 20 mg/dL      Creatinine 0.68 mg/dL      Sodium 141 mmol/L      Potassium 4.2 mmol/L      Chloride 100 mmol/L      CO2 30.0 mmol/L      Calcium 10.0 mg/dL      Total Protein 7.6 g/dL      Albumin 4.20 g/dL      ALT (SGPT) 25 U/L      AST (SGOT) 28 U/L      Alkaline Phosphatase 76 U/L      Total Bilirubin 0.6 mg/dL      eGFR Non African Amer 81 mL/min/1.73      Globulin 3.4 gm/dL      A/G Ratio 1.2 g/dL      BUN/Creatinine Ratio 29.4 (H)     Anion Gap 11.0 mmol/L     Narrative:       The MDRD GFR formula is only valid for adults with stable renal function between ages 18 and 70.    Troponin [[id number]]  (Normal) Collected:  04/13/18 2049    Specimen:  Blood Updated:  04/13/18 2119     Troponin I <0.012 ng/mL     CBC Auto Differential [[id number]]  (Abnormal) Collected:  04/13/18 2049    Specimen:  Blood Updated:  04/13/18 2056     WBC 7.90  10*3/mm3      RBC 4.04 (L) 10*6/mm3      Hemoglobin 11.8 (L) g/dL      Hematocrit 36.6 (L) %      MCV 90.6 fL      MCH 29.2 pg      MCHC 32.2 (L) g/dL      RDW 13.2 %      RDW-SD 43.7 fl      MPV 10.4 fL      Platelets 204 10*3/mm3      Neutrophil % 78.5 (H) %      Lymphocyte % 8.7 (L) %      Monocyte % 10.9 %      Eosinophil % 0.9 %      Basophil % 0.5 %      Immature Grans % 0.5 %      Neutrophils, Absolute 6.20 10*3/mm3      Lymphocytes, Absolute 0.69 (L) 10*3/mm3      Monocytes, Absolute 0.86 10*3/mm3      Eosinophils, Absolute 0.07 10*3/mm3      Basophils, Absolute 0.04 10*3/mm3      Immature Grans, Absolute 0.04 (H) 10*3/mm3      nRBC 0.0 /100 WBC     BNP [[id number]]  (Abnormal) Collected:  04/13/18 2049    Specimen:  Blood Updated:  04/14/18 0014     proBNP 3,410.0 (H) pg/mL     Magnesium [[id number]]  (Normal) Collected:  04/13/18 2049    Specimen:  Blood Updated:  04/14/18 0004     Magnesium 1.7 mg/dL     TSH [[id number]]  (Normal) Collected:  04/13/18 2049    Specimen:  Blood Updated:  04/14/18 0036     TSH 2.360 mIU/mL           Objective   Physical Exam   Constitutional: She is oriented to person, place, and time. She appears well-nourished.  Non-toxic appearance. No distress.   HENT:   Head: Atraumatic.   Mouth/Throat: Oropharynx is clear and moist and mucous membranes are normal.   Eyes: EOM are normal. Pupils are equal, round, and reactive to light.   Neck: Normal range of motion. Neck supple. No JVD present. No tracheal deviation present.   Cardiovascular: Regular rhythm and normal heart sounds.  Tachycardia present.  Exam reveals no gallop and no friction rub.    No murmur heard.  Pulmonary/Chest: Effort normal. No stridor. No tachypnea. No respiratory distress. She has no decreased breath sounds. She has wheezes. She has no rhonchi. She has no rales.   Abdominal: Soft. There is no tenderness.   Musculoskeletal: Normal range of motion. She exhibits no edema, tenderness or deformity.   No calf Swelling  "or tenderness   Neurological: She is alert and oriented to person, place, and time.   Skin: Skin is warm and dry.   Psychiatric: She has a normal mood and affect.   Nursing note and vitals reviewed.      ECG 12 Lead    Date/Time: 4/13/2018 11:17 PM  Performed by: KYAW OSBORN  Authorized by: KYAW OSBORN   Interpreted by physician  Comparison: compared with previous ECG   Rhythm: atrial fibrillation  Rate: normal  QRS axis: normal  T depression: III  Clinical impression: dysrhythmia - atrial  Comments: A. fib that is rate controlled.  Nonspecific ST and T-wave abnormalities.  No identifiable P waves.  QTc 444.               XR Chest 2 View   ED Interpretation   No pneumothorax.  No obvious focal consolidation or infiltrate.  No significant change from previous chest x-ray.      CT Angiogram Chest With Contrast    (Results Pending)       /94   Pulse 85   Temp 98.7 °F (37.1 °C) (Temporal Artery )   Resp 18   Ht 165.1 cm (65\")   Wt 59 kg (130 lb)   SpO2 100%   BMI 21.63 kg/m²     ED Course    ED Course   Value Comment By Time   Glucose: (!) 263 (Reviewed) Kyaw Osborn MD 04/13 6307    This is a 89-year-old female who presents in setting of dyspnea and cough.  History of CHF on Lasix.  Patient did have wheezing bilaterally and was initially slightly tachycardic.  BNP 3400 without previous for comparison.  EKG unchanged.  Labs otherwise benign.  D-dimer was elevated therefore CTA obtained.  His negative for PE.  There are nodular opacities concerning for inflammatory or infectious process. Kyaw Osborn MD 04/14 4779    Upon reassessment, patient is well-appearing.  She does state her symptoms have largely resolved.  I did offer admission versus discharge.  Patient does want to go home.  Her daughter is in agreement.  She will continue to watch her.  We will give her antibiotics and steroids.  There is no concerning signs for fluid overload.  Kyaw Osborn MD 04/14 [id number] "       Medications   ipratropium-albuterol (DUO-NEB) nebulizer solution 3 mL (3 mL Nebulization Given 4/13/18 2323)   methylPREDNISolone sodium succinate (SOLU-Medrol) injection 125 mg (125 mg Intravenous Given 4/14/18 0001)   furosemide (LASIX) injection 40 mg (40 mg Intravenous Given 4/14/18 0157)   iopamidol (ISOVUE-370) 76 % injection 100 mL (76 mL Intravenous Given 4/14/18 0131)            MDM  Number of Diagnoses or Management Options  COPD exacerbation: new and requires workup     Amount and/or Complexity of Data Reviewed  Clinical lab tests: ordered and reviewed  Tests in the radiology section of CPT®: ordered and reviewed  Decide to obtain previous medical records or to obtain history from someone other than the patient: yes  Independent visualization of images, tracings, or specimens: yes    Risk of Complications, Morbidity, and/or Mortality  Presenting problems: moderate  Diagnostic procedures: moderate  Management options: low    Patient Progress  Patient progress: improved      Final diagnoses:   COPD exacerbation          Kyaw Osborn MD  04/14/18 1977

## 2018-04-15 PROBLEM — J18.9 PNEUMONIA OF BOTH LOWER LOBES DUE TO INFECTIOUS ORGANISM: Status: ACTIVE | Noted: 2018-01-01

## 2018-04-15 NOTE — PROGRESS NOTES
"Pharmacy Dosing Service  Anticoagulant  Enoxaparin     Assessment/Action/Plan:  Pharmacy to dose Lovenox for DVT PPX.  Initiated Lovenox 40 mg q 24hrs.      Subjective:  Kortney Henson is a 89 y.o. female on Enoxaparin 40 mg SQ every 24 hours for indication of VTE prophylaxis.  Objective:  [Ht: 165.1 cm (65\"); Wt: 59 kg (130 lb); BMI: Body mass index is 21.63 kg/m².]  Estimated Creatinine Clearance: 43.5 mL/min (by C-G formula based on SCr of 0.69 mg/dL).         Lab Results   Component Value Date     INR 1.07 02/20/2016     PROTIME 14.2 02/20/2016            Lab Results   Component Value Date     HGB 11.3 (L) 04/15/2018     HGB 11.8 (L) 04/13/2018     HGB 12.2 03/15/2018            Lab Results   Component Value Date      04/15/2018      04/13/2018      03/15/2018         SMITA Trujillo RPH  04/15/18 1:59 PM   "

## 2018-04-15 NOTE — ED NOTES
DR. MCDONALD AT BEDSIDE. PT PLACED ON 4L O2 NC. RESPIRATORY CALLED FOR DUO-NEB.      Anisa Cristina RN  04/15/18 1202

## 2018-04-15 NOTE — PLAN OF CARE
Problem: Patient Care Overview  Goal: Plan of Care Review  Outcome: Ongoing (interventions implemented as appropriate)   04/15/18 1538   Coping/Psychosocial   Plan of Care Reviewed With patient   Plan of Care Review   Progress no change   OTHER   Outcome Summary Pt new admit from ER. VSS. Currently on 3L 02 with sats in low 90's. Family at bedside. Will continue to monitor.      Goal: Individualization and Mutuality  Outcome: Ongoing (interventions implemented as appropriate)    Goal: Discharge Needs Assessment  Outcome: Ongoing (interventions implemented as appropriate)    Goal: Interprofessional Rounds/Family Conf  Outcome: Ongoing (interventions implemented as appropriate)      Problem: Fall Risk (Adult)  Goal: Identify Related Risk Factors and Signs and Symptoms  Outcome: Ongoing (interventions implemented as appropriate)    Goal: Absence of Fall  Outcome: Ongoing (interventions implemented as appropriate)      Problem: Skin Injury Risk (Adult)  Goal: Identify Related Risk Factors and Signs and Symptoms  Outcome: Ongoing (interventions implemented as appropriate)    Goal: Skin Health and Integrity  Outcome: Ongoing (interventions implemented as appropriate)      Problem: Pneumonia (Adult)  Goal: Signs and Symptoms of Listed Potential Problems Will be Absent, Minimized or Managed (Pneumonia)  Outcome: Ongoing (interventions implemented as appropriate)

## 2018-04-15 NOTE — ED PROVIDER NOTES
[facility]  eMERGENCY dEPARTMENT eNCOUnter      Pt Name: Kortney Henson  MRN: [id number]  Birthdate 8/16/1927  Date of evaluation: 4/15/2018      CHIEF COMPLAINT       Chief Complaint   Patient presents with   • Shortness of Breath       Nurses Notes reviewed and I agree except as noted in the HPI.      HISTORY OF PRESENT ILLNESS    Kortney Henson is a 89 y.o. female who presents      For shortness of breath.  Patient recently seen at this facility Friday.  She was discharged home with a diagnosis of COPD exacerbation.  Patient has not gotten any better and his fact has gotten much worse and chest her day.  Upon arrival patient is in mild distress, he is using accessory muscles to facilitate respirations.  And upon being hooked up to the O2 monitor patient had an O2 saturation 68%.  Respirations were 38-40 on arrival.    REVIEW OF SYSTEMS     Review of Systems  CONSTITUTION: No Fever, No chills, No activity change, No diaphoresis, No unexpected wt change.    HENT: No congestion, no dental problems, no ear pain or discharge,   EYES: No drainage, no itching, no photophobia, no visual disturbance  RESPIRATORY: As per the HPI otherwise negative.  CARDIOVASCULAR: No chest pain, no palpitations, no leg swelling  GI: No abdominal distention, no abdominal pain, no rectal bleeding, no melena, no hematochezia, no diarrhea, no nausea, no vomiting  ENDOCRINE: No polydipsia, no polyphagia, no polyuria, no cold or heat intolerance  : No difficulty urinating, no dyspareunia, no dysuria, no flank pain, no frequency, no genital sore, no hematuria, no menstrual problem if female, no decreased urination, no hesitation of urination, no vaginal discharge if female, no vaginal pain if female no penile pain if male  ALLERG/IMMUNO:  No env or food allergies, not immunocompromised  NEUROLOGICAL: No dizziness, no facial asymmetry, no Headaches, no Light-headedness, no numbness nor paresthesias, no seizures, no speech  difficulty, No syncope, no tremors, no weakness  HEMATOLOGIC: No adenopathy, no unusual bleeding or bruising  MUSC: No arthralgia, no back pain, no joint swelling, no myalgias, no neck pain, no neck stiffness  SKIN: No rash, no color change, no pallor, no wound  PSYCH: No agitation, no behavior problem, no confusion, no decreased concentration, no hallucinations, no suicidal ideation, no homicidal ideation, no self injury, no sleep disturbance  All other systems negative.    PAST MEDICAL HISTORY     Past Medical History:   Diagnosis Date   • Anemia    • CHF (congestive heart failure)    • Diabetes    • Hyperlipidemia    • Hypertension        SURGICAL HISTORY      has a past surgical history that includes Femur fracture surgery.    CURRENT MEDICATIONS        Medication List      ASK your doctor about these medications    amLODIPine 5 MG tablet  Commonly known as:  NORVASC  TAKE ONE TABLET TWICE DAILY GENERIC FOR NORVASC     aspirin 81 MG EC tablet     atorvastatin 10 MG tablet  Commonly known as:  LIPITOR  TAKE ONE TABLET AT BEDTIME GENERIC FOR LIPITOR     Calcium-Vitamin D 600-400 MG-UNIT tablet     docusate sodium 100 MG capsule  Commonly known as:  COLACE     DULoxetine 30 MG capsule  Commonly known as:  CYMBALTA  TAKE 1 CAPSULE DAILY GENERIC FOR CYMBALTA     furosemide 20 MG tablet  Commonly known as:  LASIX  TAKE 1 TABLET DAILY GENERIC FOR LASIX     glimepiride 4 MG tablet  Commonly known as:  AMARYL  TAKE ONE TABLET EVERY MORNING GENERIC FOR AMARYL     glucose blood test strip  Commonly known as:  COOL BLOOD GLUCOSE TEST STRIPS  USE AS DIRECTED DAILY     JANUVIA 100 MG tablet  Generic drug:  SITagliptin  TAKE ONE TABLET DAILY     levoFLOXacin 750 MG tablet  Commonly known as:  LEVAQUIN  Take 1 tablet by mouth Daily.     losartan 100 MG tablet  Commonly known as:  COZAAR  TAKE ONE TABLET DAILY GENERIC FOR COZAAR     metFORMIN  MG 24 hr tablet  Commonly known as:  GLUCOPHAGE-XR  TAKE  2 TABLETS DAILY WITH  "EVENING MEAL GENERIC FOR GLUCOPHAGE ER( DOSE IN    REHAB CENTER)     O2  Commonly known as:  OXYGEN     potassium chloride 10 MEQ CR capsule  Commonly known as:  MICRO-K  TAKE 1 CAPSULE DAILY (WITH LASIX)     predniSONE 20 MG tablet  Commonly known as:  DELTASONE  Take 2 tablets by mouth daily for 5 days.     propranolol 40 MG tablet  Commonly known as:  INDERAL  TAKE ONE TABLET THREE TIMES DAILY GENERIC FOR INDERAL     raNITIdine 150 MG tablet  Commonly known as:  ZANTAC  TAKE ONE TABLET TWICE DAILY GENERIC FOR ZANTAC     teriparatide 600 MCG/2.4ML injection  Commonly known as:  FORTEO     WOMENS MULTI VITAMIN & MINERAL tablet          ALLERGIES     is allergic to penicillins.    FAMILY HISTORY     has no family status information on file.    family history is not on file.    SOCIAL HISTORY      reports that she has never smoked. She has never used smokeless tobacco. She reports that she does not drink alcohol or use drugs.    PHYSICAL EXAM     INITIAL VITALS:  height is 165.1 cm (65\") and weight is 59 kg (130 lb). Her temperature is 97.8 °F (36.6 °C). Her blood pressure is 144/74 and her pulse is 90. Her respiration is 20 and oxygen saturation is 95%.    Physical Exam    CONSTITUTIONAL: Well developed, well nourished, not diaphoretic nor distressed  HENT: Normocephalic, atraumatic, oropharynx clear and moist  EYES: PERRL, EOM normal, no discharge, no scleral icterus  NECK: ROM normal, supple, no tracheal deviation nor JVD, no stridor  CARDIOVASCULAR: Normal rate and rhythm, heart sounds normal, no rub no gallop, intact distal pulses, normal cap refill  PULMONARY: Normal effort and breath sounds, no distress, no wheezes, rhonchi or rales, no chest tenderness  ABDOMINAL: Soft, nontender, no guarding, no mass, no rebound, no hernia  GENITOURINARY/ANORECTAL: deferred  MUSCULOSKELETAL: ROM normal, no tenderness nor deformity, no edema  NEUROLOGICAL: Alert, oriented x 3, DTRS normal, CN x 10 normal, normal tone  SKIN: " Warm, dry, no erythema, no rash, normal color  PSYCH: Mood and affect normal, behavior normal, thought content and judgement normal.  SEPTIC SHOCK FOCUSED EXAM    *Must be completed by a licensed independent Practitioner within 6 hours of diagnosis for the following conditions -- Septic Shock or Severe Sepsis with Lactate >/= 4.    Fluid bolus must be completed prior to assessment.      Diagnosis: Pneumonia     Vital Signs (Attestation) Reviewed Temp, HR, RR, BP   General Ill-appearing    Respiratory respiratory distress, decreased breath sounds, accessory muscle use, rales and wheezing   Cardiac/Chest no edema, no gallop, no JVD, no murmur and tachycardia   Skin (documentation of skin color is required) pink mucosa and warm/dry   Capillary Refill <3 seconds   Peripheral Pulses Checked                          Normal limits 2/4     † Septic shock is defined by CMS as severe sepsis plus one of the following: persistent hypotension after fluid bolus OR tissue hypoperfusion (Lactic Acid ?4)  †† TWO OF THE FOLLOWING can be done in lieu of the Focused Exam: Measure CVP; Measure ScVO2; Bedside cardiovascular ultrasound; Dynamic assessment of fluid responsiveness with passive leg raise or fluid challenge.      Martine Hopper DO  04/15/18  12:58 PM        DIFFERENTIAL DIAGNOSIS:       DIAGNOSTIC RESULTS     EKG: All EKG's are interpreted by the Emergency Department Physician who either signs or Co-signs this chart in the absence of a cardiologist.    EKG A. fib mild elevation of 105 normal axis  RADIOLOGY: non-plain film images(s) such as CT, Ultrasound and MRI are read by the radiologist.  Plain radiographic images are visualized and preliminarily interpreted by the emergency physician unless otherwise stated below.  Xr Chest 2 View    Result Date: 4/14/2018  Narrative: EXAMINATION: Chest 2 view 4/13/2018  HISTORY: Cough and shortness of breath  FINDINGS: Today's exam is compared to previous study 2/20/2016. There is  cardiomegaly with mild prominent vascular congestion. There is no evidence of lobar pneumonia or effusion. There is a tortuous thoracic aorta.      Impression: . Moderate cardiomegaly with mild vascular congestion. No evidence of lobar pneumonia or effusion. This report was finalized on 04/14/2018 10:10 by Dr. Ranjan Coyle MD.    Xr Chest 1 View    Result Date: 4/15/2018  Narrative: EXAMINATION: Chest 1 view 4/15/2018  HISTORY: Simple sepsis protocol  FINDINGS: Today's exam is compared to a previous study of 2 days earlier. There is cardiomegaly with equalization of the pulmonary vascularity. There are increased interstitial markings of the lungs which are unchanged from the previous study. By CT examination there is what appeared to be a patchy bilateral bronchopneumonia. There is no effusion or free air present.      Impression: . Stable appearance of the chest from previous study of 2 days earlier. This report was finalized on 04/15/2018 13:06 by Dr. Ranjan Coyle MD.    Ct Angiogram Chest With Contrast    Result Date: 4/14/2018  Narrative: CT chest angiogram dated 4/14/2018 1:20 AM CDT  HISTORY: Dyspnea and cough  COMPARISON: None.  DLP: 339 mGy cm. Automated exposure control was utilized to diminish patient radiation dose.  TECHNIQUE: Helical tomographic images of the chest were obtained after the administration of intravenous contrast following angiogram protocol. Additionally, 3D MIP reconstructions in the coronal and sagittal planes were provided.    FINDINGS:  There is mild enlargement of the thyroid gland with diffuse nodularity. If not previously evaluated this could be further assessed with ultrasound. There are some prominent lymph nodes at the base of the neck. This includes a 8 mm short axis node at the base of the right neck and a 1.8 x 0.9 cm node at the base of the left neck.  There is adequate enhancement of the pulmonary arteries to evaluate for central and segmental pulmonary emboli.  There are no filling defects within the main, lobar, segmental or visualized subsegmental pulmonary arteries. The pulmonary vessels are within normal limits.  There is bronchial wall thickening throughout both lungs suggesting an element of bronchitis. There is scarring within the lung apices. An ill-defined groundglass opacity is noted within the right upper lobe measuring approximately 9 mm in size. There is also ill-defined airspace disease involving the lingular segment of the left upper lobe. Ill-defined nodular opacities are noted within both lower lobes. I would favor this to represent an infectious process-likely a patchy bilateral pneumonia. There is no evidence of lobar consolidation. There is no effusion present.. The trachea and bronchial tree are patent.  There is mild cardiomegaly. Coronary artery calcifications are noted in the right coronary, LAD and circumflex distributions.. There is no pericardial effusion.  There are multiple middle mediastinal nodes none of which are enlarged by CT criteria. Several these nodes are borderline in size measuring 8 to 9 mm in short axis. This includes right paratracheal, pretracheal and AP window nodes. There is no enlarged axillary adenopathy..  The osseous structures of the thorax and surrounding soft tissues demonstrate no acute process.  A 10 mm left adrenal nodules present likely representing an adenoma. There is a small hiatal hernia. There is some mild esophageal dilatation within the mid and upper thorax of uncertain etiology.      Impression: 1. No evidence of pulmonary embolus. The thoracic aorta is normal in caliber with no dissection or aneurysm. 2. Moderate cardiomegaly. Coronary calcifications are present. 3. Patchy ill-defined nodular opacities within both lower lobes, lingular segment left upper lobe and right upper lobe. Radiographically this has the appearance of a patchy bilateral pneumonia. There is bronchial wall thickening within both lungs,  particularly within both lower lobes. No effusion is present. 4. Suspected left adrenal adenoma. 5. Hiatal hernia. There is mild esophageal dilatation within the mid and upper thorax of uncertain etiology. Barium swallow may be helpful for further characterization..   This report was finalized on 04/14/2018 08:18 by Dr. Ranjan Coyle MD.             LABS:   Lab Results (last 24 hours)     Procedure Component Value Units Date/Time    Blood Gas, Arterial [[id number]]  (Abnormal) Collected:  04/15/18 1205    Specimen:  Arterial Blood Updated:  04/15/18 1210     Site Left Radial     Bruce's Test Positive     pH, Arterial 7.358 pH units      pCO2, Arterial 48.2 (H) mm Hg      pO2, Arterial 98.2 mm Hg      HCO3, Arterial 27.1 (H) mmol/L      Base Excess, Arterial 1.0 mmol/L      O2 Saturation, Arterial 97.7 %      Temperature 37.0 C      Barometric Pressure for Blood Gas 747 mmHg      Modality Nasal Cannula     Flow Rate 5.0 lpm      Ventilator Mode NA     Collected by dr ball    Blood Culture With DINESH - Blood, [[id number]] Collected:  04/15/18 1208    Specimen:  Blood from Arm, Right Updated:  04/15/18 1223    Blood Culture With DINESH - Blood, [[id number]] Collected:  04/15/18 1210    Specimen:  Blood from Hand, Left Updated:  04/15/18 1223    CBC & Differential [[id number]] Collected:  04/15/18 1211    Specimen:  Blood Updated:  04/15/18 1223    Narrative:       The following orders were created for panel order CBC & Differential.  Procedure                               Abnormality         Status                     ---------                               -----------         ------                     CBC Auto Differential[[id number]]        Abnormal            Final result                 Please view results for these tests on the individual orders.    Comprehensive Metabolic Panel [[id number]]  (Abnormal) Collected:  04/15/18 1211    Specimen:  Blood Updated:  04/15/18 1237     Glucose 344 (H) mg/dL      BUN 32 (H) mg/dL       Creatinine 0.69 mg/dL      Sodium 139 mmol/L      Potassium 4.5 mmol/L      Chloride 98 mmol/L      CO2 32.0 (H) mmol/L      Calcium 9.6 mg/dL      Total Protein 7.6 g/dL      Albumin 4.20 g/dL      ALT (SGPT) 35 U/L      AST (SGOT) 44 U/L      Alkaline Phosphatase 67 U/L      Total Bilirubin 0.6 mg/dL      eGFR Non African Amer 80 mL/min/1.73      Globulin 3.4 gm/dL      A/G Ratio 1.2 g/dL      BUN/Creatinine Ratio 46.4 (H)     Anion Gap 9.0 mmol/L     Narrative:       The MDRD GFR formula is only valid for adults with stable renal function between ages 18 and 70.    Lactic Acid, Plasma [[id number]]  (Abnormal) Collected:  04/15/18 1211    Specimen:  Blood Updated:  04/15/18 1240     Lactate 2.4 (C) mmol/L     CBC Auto Differential [[id number]]  (Abnormal) Collected:  04/15/18 1211    Specimen:  Blood Updated:  04/15/18 1223     WBC 12.58 (H) 10*3/mm3      RBC 3.87 (L) 10*6/mm3      Hemoglobin 11.3 (L) g/dL      Hematocrit 34.9 (L) %      MCV 90.2 fL      MCH 29.2 pg      MCHC 32.4 (L) g/dL      RDW 13.2 %      RDW-SD 43.3 fl      MPV 10.5 fL      Platelets 239 10*3/mm3      Neutrophil % 89.8 (H) %      Lymphocyte % 4.8 (L) %      Monocyte % 4.6 %      Eosinophil % 0.0 %      Basophil % 0.2 %      Immature Grans % 0.6 %      Neutrophils, Absolute 11.30 (H) 10*3/mm3      Lymphocytes, Absolute 0.60 (L) 10*3/mm3      Monocytes, Absolute 0.58 10*3/mm3      Eosinophils, Absolute 0.00 10*3/mm3      Basophils, Absolute 0.02 10*3/mm3      Immature Grans, Absolute 0.08 (H) 10*3/mm3      nRBC 0.0 /100 WBC     Troponin [[id number]] Collected:  04/15/18 1211    Specimen:  Blood Updated:  04/15/18 1257    D-dimer, Quantitative [[id number]]  (Abnormal) Collected:  04/15/18 1211    Specimen:  Blood Updated:  04/15/18 1252     D-Dimer, Quantitative 0.91 (H) mg/L (FEU)     Narrative:       Reference Range is 0-0.50 mg/L FEU. However, results <0.50 mg/L FEU tends to rule out DVT or PE. Results >0.50 mg/L FEU are not useful in  predicting absence or presence of DVT or PE.    BNP [[id number]] Collected:  04/15/18 1211    Specimen:  Blood Updated:  04/15/18 1257    Lactic Acid, Reflex Timer (This will reflex a repeat order 3-3:15 hours after ordered.) [[id number]] Collected:  04/15/18 1211    Specimen:  Blood Updated:  04/15/18 1240          EMERGENCY DEPARTMENT COURSE:   Vitals:    Vitals:    04/15/18 1201 04/15/18 1213 04/15/18 1218 04/15/18 1228   BP: 144/74      Pulse: 100 96 90    Resp: 27 22 20    Temp:    97.8 °F (36.6 °C)   SpO2: 95% 95%     Weight:       Height:           The patient was given the following medications:  Medications   sodium chloride 0.9 % flush 10 mL (not administered)   levoFLOXacin (LEVAQUIN) 750 mg/150 mL D5W (premix) (LEVAQUIN) 750 mg (not administered)   ipratropium-albuterol (DUO-NEB) nebulizer solution 3 mL (3 mL Nebulization Given 4/15/18 1213)   furosemide (LASIX) injection 20 mg (20 mg Intravenous Given 4/15/18 1254)       ED Course       CRITICAL CARE:  none    CONSULTS:  none    PROCEDURES:  None    FINAL IMPRESSION      1. Pneumonia of both lower lobes due to infectious organism          DISPOSITION/PLAN   Admitted to the service of Dr. Paniagua in improved condition.  PATIENT REFERRED TO:  No follow-up provider specified.    DISCHARGE MEDICATIONS:     Medication List      ASK your doctor about these medications    amLODIPine 5 MG tablet  Commonly known as:  NORVASC  TAKE ONE TABLET TWICE DAILY GENERIC FOR NORVASC     aspirin 81 MG EC tablet     atorvastatin 10 MG tablet  Commonly known as:  LIPITOR  TAKE ONE TABLET AT BEDTIME GENERIC FOR LIPITOR     Calcium-Vitamin D 600-400 MG-UNIT tablet     docusate sodium 100 MG capsule  Commonly known as:  COLACE     DULoxetine 30 MG capsule  Commonly known as:  CYMBALTA  TAKE 1 CAPSULE DAILY GENERIC FOR CYMBALTA     furosemide 20 MG tablet  Commonly known as:  LASIX  TAKE 1 TABLET DAILY GENERIC FOR LASIX     glimepiride 4 MG tablet  Commonly known as:  AMARYL  TAKE  ONE TABLET EVERY MORNING GENERIC FOR AMARYL     glucose blood test strip  Commonly known as:  COOL BLOOD GLUCOSE TEST STRIPS  USE AS DIRECTED DAILY     JANUVIA 100 MG tablet  Generic drug:  SITagliptin  TAKE ONE TABLET DAILY     levoFLOXacin 750 MG tablet  Commonly known as:  LEVAQUIN  Take 1 tablet by mouth Daily.     losartan 100 MG tablet  Commonly known as:  COZAAR  TAKE ONE TABLET DAILY GENERIC FOR COZAAR     metFORMIN  MG 24 hr tablet  Commonly known as:  GLUCOPHAGE-XR  TAKE  2 TABLETS DAILY WITH EVENING MEAL GENERIC FOR GLUCOPHAGE ER( DOSE IN    REHAB CENTER)     O2  Commonly known as:  OXYGEN     potassium chloride 10 MEQ CR capsule  Commonly known as:  MICRO-K  TAKE 1 CAPSULE DAILY (WITH LASIX)     predniSONE 20 MG tablet  Commonly known as:  DELTASONE  Take 2 tablets by mouth daily for 5 days.     propranolol 40 MG tablet  Commonly known as:  INDERAL  TAKE ONE TABLET THREE TIMES DAILY GENERIC FOR INDERAL     raNITIdine 150 MG tablet  Commonly known as:  ZANTAC  TAKE ONE TABLET TWICE DAILY GENERIC FOR ZANTAC     teriparatide 600 MCG/2.4ML injection  Commonly known as:  FORTEO     WOMENS MULTI VITAMIN & MINERAL tablet          (Please note that portions of this note were completed with a voice recognition program.)    Martine Hopper, DO Martine Hopper DO  04/15/18 5747

## 2018-04-16 PROBLEM — G47.34 NOCTURNAL HYPOXEMIA: Status: ACTIVE | Noted: 2017-02-16

## 2018-04-16 NOTE — PROGRESS NOTES
"Discharge Planning Assessment  [facility]     Patient Name: Kortney Henson  MRN: [id number]  Today's Date: 4/16/2018    Admit Date: 4/15/2018          Discharge Needs Assessment     Row Name 04/16/18 1556       Living Environment    Lives With (P)  alone    Current Living Arrangements (P)  home/apartment/condo    Primary Care Provided by (P)  self    Provides Primary Care For (P)  no one    Family Caregiver if Needed (P)  grandchild(homa), adult    Quality of Family Relationships (P)  helpful;supportive    Able to Return to Prior Arrangements (P)  yes       Resource/Environmental Concerns    Transportation Concerns (P)  car, none       Transition Planning    Patient/Family Anticipates Transition to (P)  home    Patient/Family Anticipated Services at Transition (P)  none    Transportation Anticipated (P)  car, drives self;family or friend will provide       Discharge Needs Assessment    Readmission Within the Last 30 Days (P)  no previous admission in last 30 days    Concerns to be Addressed (P)  no discharge needs identified    Equipment Currently Used at Home (P)  commode;oxygen;walker, rolling;rollator   O2 @ [facility] from [facility]    Anticipated Changes Related to Illness (P)  none    Equipment Needed After Discharge (P)  none    Discharge Coordination/Progress (P)  Pt lives at home alone and plans to return at [place]. Pt states her grandson or other family member/friend is able to take her to her MD appts. Pt has O2 at home at [facility] and other needed DME. Pt declines home health services saying \"I just got through with them and I think I get around pretty good\". No other needs identified. Will follow.            Discharge Plan    No documentation.       Destination     No service coordination in this encounter.      Durable Medical Equipment     No service coordination in this encounter.      Dialysis/Infusion     No service coordination in this encounter.      Home Medical Care     No service coordination in this encounter.    "   Social Care     No service coordination in this encounter.                Demographic Summary    No documentation.           Functional Status    No documentation.           Psychosocial    No documentation.           Abuse/Neglect    No documentation.           Legal    No documentation.           Substance Abuse    No documentation.           Patient Forms    No documentation.         Annette Cabrera

## 2018-04-16 NOTE — PLAN OF CARE
Problem: Patient Care Overview  Goal: Plan of Care Review  Outcome: Ongoing (interventions implemented as appropriate)   04/16/18 1600 04/16/18 6294   Coping/Psychosocial   Plan of Care Reviewed With patient;family --    Plan of Care Review   Progress --  no change   OTHER   Outcome Summary --  Echo ordered this AM for patient. Cardiology consulted, breathing treatments ordered and IV lasix ordered x 1. Pt states that she felt better after breathing treatment was seen to visibly be breathing better, VSS, will continue to monitor.        Problem: Fall Risk (Adult)  Goal: Identify Related Risk Factors and Signs and Symptoms  Outcome: Ongoing (interventions implemented as appropriate)    Goal: Absence of Fall  Outcome: Ongoing (interventions implemented as appropriate)      Problem: Skin Injury Risk (Adult)  Goal: Identify Related Risk Factors and Signs and Symptoms  Outcome: Ongoing (interventions implemented as appropriate)    Goal: Skin Health and Integrity  Outcome: Ongoing (interventions implemented as appropriate)      Problem: Pneumonia (Adult)  Goal: Signs and Symptoms of Listed Potential Problems Will be Absent, Minimized or Managed (Pneumonia)  Outcome: Ongoing (interventions implemented as appropriate)

## 2018-04-16 NOTE — NURSING NOTE
Echo was called at 1503 about an order that was put in as routine at 0612 this AM.  I was told by tech that the patient would be taken care of today.  So far the test is still waiting to be performed.  Martine Ceron RN

## 2018-04-16 NOTE — PLAN OF CARE
Problem: Patient Care Overview  Goal: Plan of Care Review  Outcome: Ongoing (interventions implemented as appropriate)   04/16/18 1600   Coping/Psychosocial   Plan of Care Reviewed With patient;family   Plan of Care Review   Progress no change   OTHER   Outcome Summary Initial RDN eval. Pt reports limited food preferences with high intake of salty or sweet snacks. To promote adequate intake, RDN encouraged other, more nutrient dense foods with focus on limiting added sugars and salt. She has been on Cardiac/CCHO/Renal diet since admission, RDN d/c'd renal restriction secondary to renal function labs appear appropriate and no noted hx kidney disease. Did encouraged intake and advised of alt selections. Pt was not interested in any supplements. Will continue to follow.

## 2018-04-16 NOTE — PLAN OF CARE
Problem: Patient Care Overview  Goal: Plan of Care Review  Outcome: Ongoing (interventions implemented as appropriate)   04/16/18 0437   Coping/Psychosocial   Plan of Care Reviewed With patient   Plan of Care Review   Progress no change   OTHER   Outcome Summary VSS, no changes this shift

## 2018-04-16 NOTE — H&P
"Patient ID: Kortney Henson  MRN: [id number]     Acct:  [id number]  Admit Date: 4/15/2018   Date of service: 4/16/2018    SOURCE: The source of this information is prior knowledge of the patient, review of her office records, her current chart, as well as discussion with she/ER personal; she is considered reliable though vague on details     PATIENT PROFILE: The patient is a 89 y/o white  female ; she was cooperative.      CHIEF COMPLAINT: \"shortness of breath\"     HISTORY OF PRESENT ILLNESS: She has several chronic illnesses that include nocturnal hypoxemia, HTN, DM2-generally controlled, cardiac arrhythmia with few PVC on holter 2016. She says she has some degree of chronic cough; she has never smoked.  Around 4.11.18 she developed increasing cough, tachypnea, and mild SOB.  She was seen [facility] ED with feeling she had copd excerbation; she had D dimer 1.67H, was in new A fib/rate controlled.  She had CXR that showed cardiomegaly, vascular congestion.  She was agreeable to home care; and was sent home on levaquin and steroids.  She returned 4.15.18 worse and this time was felt to have pneumonia; CT angio showed no pulmonary embolus but patchy nodular opacities bilaterally upper lobes felt to be pneumonia.  She was \"hypoxic\" and had RR 36, , lactic acid 2.4, WBC 12.58 neutrophils 89%, proBNP 4250, D dimer 0.91  (last A1c 6.90).  Admission was advised.  We continued her duonebs, Levaquin (has many allergies to antibiotics), and steroids with insulin coverage.  She was given a dose of lasix IV in ED/noting she has home po lasix.   She feels alittle better on AM rounds.  I now note EKG shows new A fib (never mentioned on ED/phone report).     Allergies   Allergen Reactions   • Penicillins Swelling and Rash       HOME MEDICATIONS:  Prior to Admission medications    Medication Sig Start Date End Date Taking? Authorizing Provider   amLODIPine (NORVASC) 5 MG tablet TAKE ONE TABLET TWICE DAILY GENERIC FOR " NORVASC 1/19/18   uLciano Paniagua MD   aspirin 81 MG EC tablet Take 81 mg by mouth Daily.    Historical Provider, MD   atorvastatin (LIPITOR) 10 MG tablet TAKE ONE TABLET AT BEDTIME GENERIC FOR LIPITOR 1/19/18   Luciano Paniagua MD   Calcium Carb-Cholecalciferol (CALCIUM-VITAMIN D) 600-400 MG-UNIT tablet Take  by mouth 2 (Two) Times a Day.    Historical Provider, MD   docusate sodium (COLACE) 100 MG capsule Take 100 mg by mouth 2 (Two) Times a Day As Needed for constipation.    Historical Provider, MD   DULoxetine (CYMBALTA) 30 MG capsule TAKE 1 CAPSULE DAILY GENERIC FOR CYMBALTA 1/19/18   Luciano Paniagua MD   furosemide (LASIX) 20 MG tablet TAKE 1 TABLET DAILY GENERIC FOR LASIX 1/19/18   Luciano Paniagua MD   glimepiride (AMARYL) 4 MG tablet TAKE ONE TABLET EVERY MORNING GENERIC FOR AMARYL 1/19/18   Luciano Paniagua MD   glucose blood (COOL BLOOD GLUCOSE TEST STRIPS) test strip USE AS DIRECTED DAILY 9/14/16   Luciano Paniagua MD   JANUVIA 100 MG tablet TAKE ONE TABLET DAILY 1/19/18   Luciano Paniagua MD   levoFLOXacin (LEVAQUIN) 750 MG tablet Take 1 tablet by mouth Daily. 4/14/18   Kyaw Osborn MD   losartan (COZAAR) 100 MG tablet TAKE ONE TABLET DAILY GENERIC FOR COZAAR 1/19/18   Luciano Paniagua MD   metFORMIN ER (GLUCOPHAGE-XR) 500 MG 24 hr tablet TAKE  2 TABLETS DAILY WITH EVENING MEAL GENERIC FOR GLUCOPHAGE ER( DOSE IN  REHAB CENTER) 1/19/18   Luciano Paniagua MD   Multiple Vitamins-Minerals (WOMENS MULTI VITAMIN & MINERAL) tablet Take  by mouth Daily.    Historical Provider, MD   O2 (OXYGEN) Inhale 2 L/min Every Night. Per nasal cannula    Historical Provider, MD   potassium chloride (MICRO-K) 10 MEQ CR capsule TAKE 1 CAPSULE DAILY (WITH LASIX) 1/19/18   Luciano Paniagua MD   predniSONE (DELTASONE) 20 MG tablet Take 2 tablets by mouth daily for 5 days. 4/14/18   Kyaw Osborn MD   propranolol (INDERAL) 40 MG tablet TAKE ONE TABLET THREE TIMES DAILY  "GENERIC FOR INDERAL 18   Luciano Paniagua MD   raNITIdine (ZANTAC) 150 MG tablet TAKE ONE TABLET TWICE DAILY GENERIC FOR ZANTAC 18   Luciano Paniagua MD   teriparatide (FORTEO) 600 MCG/2.4ML injection Inject 20 mcg under the skin Daily. Dr Magana 16   Historical Provider, MD       PAST HISTORY:  PROCEDURES:  [id number]    Colonoscopy/[facility]/?    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30  Children/2    PATIENT EDUCATION:    ADVISED:  Mammogram/08+  Bone density/08+  Colonoscopy/08+  Cologuard/3.12.15    OTHER:  last flu shot/10-27-15/[facility]    HOSPITAL ADMITS:   :     HOSPITAL NOTES: [facility]  2..16-2..16  L hip fracture  osteoporosis  DM-2/controlled  hypertension  hyperlipidemia  arrythmia-undefined  vitamin D deficiency  macular degeneration  glaucoma  anticoagulation-ASA for DM/xarelto for hip fx  (to [facility])     [facility]:   none    [facility]:   none    PHYSICAL EXAMINATION:  /72 (BP Location: Left arm, Patient Position: Lying)   Pulse 109   Temp 99 °F (37.2 °C) (Temporal Artery )   Resp 18   Ht 167 cm (65.75\")   Wt 59.4 kg (131 lb)   SpO2 96%   BMI 21.31 kg/m²     GENERAL:  Well nourished/developed in no acute distress. Thin  SKIN: Turgor excellent, without wound, rash, lesion.  HEENT: Normal cephalic without trauma.  Pupils equal round reactive to light. Extraocular motions full without nystagmus.   Oral cavity without growths, exudates, and moist.  Posterior pharynx without mass, obstruction, redness.  No thyromegaly, mass, tenderness, lymphadenopathy and " supple.  CV: Regular rhythm-tachy  1/6 systolic murmur without gallop,  edema. Posterior pulses intact.  No carotid bruits.   CHEST: No chest wall tenderness or mass.   LUNGS: Symmetric motion with bilateral rhonchi to auscultation.  No dullness to percussion  ABD: Soft, nontender without mass.   ORTHO: Symmetric extremities without swelling/point tenderness.  Full gross range of motion.    NEURO: CN 2-12 grossly intact.  Symmetric facies. 1/4 x bicep  equal reflexes.  UE/LE   3/5 strength throughout.  Nonfocal use extremities. Speech clear.    PSYCH: Oriented x 3.  Pleasant calm, well kept.  Purposeful/directed conservation with intact short/long gross memory.     ASSESSMENT/PROBLEM LIST:   89 y/o white female-advanced age  Allergy/intolerance: see above  Procedural history: see above  Family history: see above  B3Z3Cl0  Menopause  Hypertension  Diabetes 2-controlled  Anticoagulated-ASA 81/DM2  Hyperlipidemia-statin treated  Cardiac arrhythmia-prior PVCs; now new a fib  Nocturnal hypoxemia-oxygen treated  Osteoporosis (hip fx)  Macular degeneration  Glaucoma  Vision decline  Anemia-  Low B12-IM replaced  Tremor-chronic/essential  Urinary incontience  Gait decline  Recurrent wax impaction  Depression-chronic    REASON FOR ADMISSION:    Shortness of breath  Respiratory failure-acute  Pneumonia-bilateral upper/community exposed/CT chest  Sepsis risk (RR 36, , lactic acid 2.4, WBC 12.58, neuto % 89%)  Hyperglycemia 344  proBNP 4250  A fib-new  cough    PLANS:   Rx-reviewed; ordered as needed and will review daily/as needed.  Await echo/decisions by cardiology on suggestions/? lovonex 1 mg/kg.  IV antibiotics.  LAB-reviewed; ordered as needed and will review daily.  Particular:  Daily lytes, CBC   Imaging-reviewed; ordered as needed and will review daily.  Particular:  Repeat CXR prior to dc.  Echo  Consults cardiology  Diet: as able  Fluids: oral  Special issues: age; she has always took very conservative  approach  DC planning: she will eventually expect home; may need rehab/skilled on the way  Code status: full; but could change

## 2018-04-17 NOTE — TELEPHONE ENCOUNTER
Olga SUE called and states pt's admitting dx was pneumonia but pt it not on any antibiotics and family and pharm is wanting to know what is going on Olga

## 2018-04-17 NOTE — CONSULTS
LOS: 1 day   Patient Care Team:  Luciano Paniagua MD as PCP - General  Luciano Paniagua MD as PCP - Family Medicine  Luciano Paniagua MD as PCP - Claims Attributed  Luciano Paniagua MD as Referring Physician (Family Medicine)  Candelario Velez MD as Consulting Physician (Otolaryngology)    Chief Complaint: Active Problems:    Pneumonia of both lower lobes due to infectious organism  Reason for consultation atrial fibrillation new diagnosis    Extremely pleasant 89-year-old  lady who was admitted to Dr. Paniagua's service with complaints of shortness of breath and cough.  She has long-standing history of systemic hypertension along with type II diabetes mellitus.  Patient was noted to be in atrial fibrillation with controlled ventricular response.  Currently on telemetry heart rate between 80-90%.  Had echocardiogram shows mild left ventricular systolic dysfunction along with left ventricular diastolic dysfunction.  Has mild shortness of breath currently denies any fever or chills has had mild cough.  Resting comfortably denies any chest pain her proBNP level is elevated.  She has leukocytosis.  She is noted to be in acute respiratory failure.  Currently she is resting quite comfortably in bed with no complaints in atrial fibrillation with controlled ventricular response.  Does have some generalized weakness and tiredness also has some shortness of breath.  Has easy fatigability.  Somewhat anxious  Telemetry: no malignant arrhythmia. No significant pauses.    Review of Systems   Constitutional: No chills   fatigue   No fever.   HENT: Negative.    Eyes: Negative.    Respiratory: Negative for cough,   No chest wall soreness,   Mild shortness of breath,   no wheezing, no stridor.    Cardiovascular: Negative for chest pain,   No palpitations   No significant  leg swelling.   Gastrointestinal: Negative for abdominal distention,  No abdominal pain,   No blood in stool, constipation, diarrhea,  nausea or vomiting.   Endocrine: Negative.    Genitourinary: Negative for difficulty urinating, dysuria, flank pain and hematuria.   Musculoskeletal: Negative.    Skin: Negative for rash and wound.   Allergic/Immunologic: Negative.    Neurological: Negative for dizziness, syncope, weakness,   No light-headedness or headaches.   Hematological: Does not bruise/bleed easily.   Psychiatric/Behavioral: Negative for agitation, behavioral problems, confusion, the patient is nervous/anxious.       History:   Past Medical History:   Diagnosis Date   • Anemia    • CHF (congestive heart failure)    • Diabetes    • Hyperlipidemia    • Hypertension      Past Surgical History:   Procedure Laterality Date   • FEMUR FRACTURE SURGERY       Social History     Social History   • Marital status:      Spouse name:    • Number of children: 2   • Years of education: 12     Occupational History   • retired      telephone co/homemaker     Social History Main Topics   • Smoking status: Never Smoker   • Smokeless tobacco: Never Used   • Alcohol use No   • Drug use: No   • Sexual activity: Defer     Other Topics Concern   • Not on file     Social History Narrative   • No narrative on file     History reviewed. No pertinent family history.    Labs:  WBC WBC   Date Value Ref Range Status   04/16/2018 8.55 4.80 - 10.80 10*3/mm3 Final   04/15/2018 12.58 (H) 4.80 - 10.80 10*3/mm3 Final      HGB Hemoglobin   Date Value Ref Range Status   04/16/2018 12.2 12.0 - 16.0 g/dL Final   04/15/2018 11.3 (L) 12.0 - 16.0 g/dL Final      HCT Hematocrit   Date Value Ref Range Status   04/16/2018 37.4 37.0 - 47.0 % Final   04/15/2018 34.9 (L) 37.0 - 47.0 % Final      Platlets Platelets   Date Value Ref Range Status   04/16/2018 233 130 - 400 10*3/mm3 Final   04/15/2018 239 130 - 400 10*3/mm3 Final      MCV MCV   Date Value Ref Range Status   04/16/2018 89.7 82.0 - 98.0 fL Final   04/15/2018 90.2 82.0 - 98.0 fL Final        Results from last 7  days  Lab Units 04/16/18  0443 04/15/18  1211 04/13/18  2049   SODIUM mmol/L 146* 139 141   POTASSIUM mmol/L 3.3* 4.5 4.2   CHLORIDE mmol/L 100 98 100   CO2 mmol/L 34.0* 32.0* 30.0   BUN mg/dL 21 32* 20   CREATININE mg/dL 0.63 0.69 0.68   CALCIUM mg/dL 9.8 9.6 10.0   BILIRUBIN mg/dL  --  0.6 0.6   ALK PHOS U/L  --  67 76   ALT (SGPT) U/L  --  35 25   AST (SGOT) U/L  --  44 28   GLUCOSE mg/dL 44* 344* 263*     Lab Results   Component Value Date    CKMB 6.14 (H) 02/20/2016    TROPONINI <0.012 04/15/2018     PT/INR:  No results found for: PROTIME/No results found for: INR    Imaging Results (last 72 hours)     Procedure Component Value Units Date/Time    XR Chest PA & Lateral [[id number]] Collected:  04/16/18 0753     Updated:  04/16/18 0757    Narrative:       EXAMINATION: Chest 2 views 4/16/2018     HISTORY: Follow-up     FINDINGS: Today's exam is compared to previous study of one day earlier.  Mild cardiomegaly with increased interstitial markings remain present.  No new consolidation or effusion is present. Mediastinal contours are  within normal limits.       Impression:       . Stable appearance of the chest from previous study of one  day earlier.  This report was finalized on 04/16/2018 07:54 by Dr. Ranjan Coyle MD.    XR Chest 1 View [[id number]] Collected:  04/15/18 1305     Updated:  04/15/18 1309    Narrative:       EXAMINATION: Chest 1 view 4/15/2018     HISTORY: Simple sepsis protocol     FINDINGS: Today's exam is compared to a previous study of 2 days  earlier. There is cardiomegaly with equalization of the pulmonary  vascularity. There are increased interstitial markings of the lungs  which are unchanged from the previous study. By CT examination there is  what appeared to be a patchy bilateral bronchopneumonia. There is no  effusion or free air present.       Impression:       . Stable appearance of the chest from previous study of 2  days earlier.  This report was finalized on 04/15/2018 13:06 by  Dr. Ranjan Coyle MD.          Objective     Allergies   Allergen Reactions   • Penicillins Swelling and Rash       Medication Review: Performed  Current Facility-Administered Medications   Medication Dose Route Frequency Provider Last Rate Last Dose   • amLODIPine (NORVASC) tablet 5 mg  5 mg Oral Q24H Luciano Paniagua MD   5 mg at 04/16/18 0930   • aspirin EC tablet 81 mg  81 mg Oral Daily Luciano Paniagua MD   81 mg at 04/16/18 0930   • atorvastatin (LIPITOR) tablet 10 mg  10 mg Oral Nightly Luciano Paniagua MD   10 mg at 04/16/18 2021   • cyanocobalamin injection 1,000 mcg  1,000 mcg Intramuscular Q30 Days Luciano Paniagua MD   1,000 mcg at 04/16/18 1144   • dextrose (D50W) solution 25 g  25 g Intravenous Q15 Min PRN Luciano Paniagua MD       • dextrose (GLUTOSE) oral gel 15 g  15 g Oral Q15 Min PRN Luciano Paniagua MD       • docusate sodium (COLACE) capsule 100 mg  100 mg Oral BID PRN Luciano Paniagua MD       • DULoxetine (CYMBALTA) DR capsule 30 mg  30 mg Oral Daily Luciano Paniagua MD   30 mg at 04/16/18 0930   • enoxaparin (LOVENOX) syringe 40 mg  40 mg Subcutaneous Q24H Luciano Paniagua MD   40 mg at 04/16/18 1534   • glipiZIDE (GLUCOTROL) tablet 2.5 mg  2.5 mg Oral QAM AC Luciano Paniagua MD   2.5 mg at 04/16/18 0929   • glucagon (human recombinant) (GLUCAGEN DIAGNOSTIC) injection 1 mg  1 mg Subcutaneous PRN Luciano Paniagua MD       • insulin lispro (humaLOG) injection 2-7 Units  2-7 Units Subcutaneous TID With Meals Luciano Paniagua MD   3 Units at 04/16/18 2210   • ipratropium-albuterol (DUO-NEB) nebulizer solution 3 mL  3 mL Nebulization 4x Daily - RT Luciano Paniagua MD   3 mL at 04/16/18 2052   • linagliptin (TRADJENTA) tablet 5 mg  5 mg Oral Daily Luciano Paniagua MD   5 mg at 04/16/18 0930   • losartan (COZAAR) tablet 100 mg  100 mg Oral Daily Luciano Paniagua MD   100 mg at 04/16/18 0900   • multivitamin w/minerals tablet 1 tablet  1  "tablet Oral Daily Luciano Paniagua MD   1 tablet at 04/16/18 0930   • Pharmacy to Dose enoxaparin (LOVENOX)   Does not apply Continuous PRN Luciano Paniagua MD       • potassium chloride (MICRO-K) CR capsule 20 mEq  20 mEq Oral BID With Meals Luciano Paniagua MD   20 mEq at 04/16/18 1913   • propranolol (INDERAL) tablet 40 mg  40 mg Oral TID Luciano Paniagua MD   40 mg at 04/16/18 2021   • sodium chloride 0.9 % flush 1-10 mL  1-10 mL Intravenous PRN Luciano Paniagua MD       • teriparatide (FORTEO) injection 20 mcg  20 mcg Subcutaneous Daily Luciano Paniagua MD           Vital Sign Min/Max for last 24 hours  Temp  Min: 98.2 °F (36.8 °C)  Max: 98.8 °F (37.1 °C)   BP  Min: 142/69  Max: 160/76   Pulse  Min: 66  Max: 97   Resp  Min: 16  Max: 20   SpO2  Min: 95 %  Max: 98 %   No Data Recorded   Weight  Min: 60.2 kg (132 lb 11.2 oz)  Max: 60.2 kg (132 lb 11.2 oz)     Flowsheet Rows    Flowsheet Row First Filed Value   Admission Height 165.1 cm (65\") Documented at 04/15/2018 1154   Admission Weight 59 kg (130 lb) Documented at 04/15/2018 1154          Results for orders placed during the hospital encounter of 04/15/18   Adult Transthoracic Echo Complete W/ Cont if Necessary Per Protocol    Narrative · Left ventricular systolic function is low normal. Estimated EF = 45%.  · Left ventricular diastolic dysfunction.  · Left atrial cavity size is severely dilated.  · Moderate pulmonary hypertension is present.          Physical Exam:  General Appearance: Awake, alert, in no acute distress  Eyes: Pupils equal and reactive    Ears: Appear intact with no abnormalities noted  Nose: Nares normal, no drainage  Neck: supple, trachea midline, no carotid bruit and no JVD  Back: no kyphosis present,    Lungs: respirations regular, respirations even and respirations unlabored  Heart: normal S1, S2,  2/6 pansystolic murmur in the left sternal border,  no rub and no click  Abdomen: normal bowel sounds, no masses, no " hepatomegaly, no splenomegaly, guarding and no rebound tenderness   Skin: no bleeding, bruising or rash  Extremities: no cyanosis  Psychiatric/Behavioral: Negative for agitation, behavioral problems, confusion, the patient does not appear to be nervous/anxious.          Results Review:   I reviewed the patient's new clinical results.  I reviewed the patient's new imaging results and agree with the interpretation.  I reviewed the patient's other test results and agree with the interpretation  I personally viewed and interpreted the patient's EKG/Telemetry data  Discussed with patient    Assessment/Plan     Active Problems:    Pneumonia of both lower lobes due to infectious organism  Diagnosed atrial fibrillation  Shortness of breath  Hypertension  Diabetes mellitus  Hypoxemia  Pneumonia  Mild hypokalemia  Severe left atrial enlargement.  Left ventricular diastolic dysfunction  Moderate pulmonary hypertension  Osteoporosis  Glaucoma    Plan  Continue current medication including cardiac medications consisting of Inderal, losartan, atorvastatin and amlodipine  No anticoagulation due to very high fall risk with gait instability and generalized weakness I discussed this with her family members earlier  Continue current medications  Given her advanced age and in general her wanting conservative medical therapy will not pursue any ischemia workup currently untreated unless she has symptoms suggestive of ischemic chest pain  Supportive care  Telemetry  Optimal medical therapy  Deep vein thrombosis prophylaxis/precautions  Appropriate diet, fluid, sodium, caffeine, stimulants intake   Compliance to diet and medications   Avoid NSAIDS  Thank you for the opportunity to participate in the care of this very delightful elderly lady      Delmar Garzon MD  04/16/18  11:32 PM

## 2018-04-17 NOTE — PLAN OF CARE
Problem: Patient Care Overview  Goal: Plan of Care Review  Outcome: Ongoing (interventions implemented as appropriate)   04/17/18 1535   Coping/Psychosocial   Plan of Care Reviewed With patient   Plan of Care Review   Progress improving   OTHER   Outcome Summary VSS; no c/o pain; no c/o n/v; continue to monitor patient       Problem: Fall Risk (Adult)  Goal: Identify Related Risk Factors and Signs and Symptoms  Outcome: Ongoing (interventions implemented as appropriate)    Goal: Absence of Fall  Outcome: Ongoing (interventions implemented as appropriate)      Problem: Skin Injury Risk (Adult)  Goal: Identify Related Risk Factors and Signs and Symptoms  Outcome: Ongoing (interventions implemented as appropriate)    Goal: Skin Health and Integrity  Outcome: Ongoing (interventions implemented as appropriate)      Problem: Pneumonia (Adult)  Goal: Signs and Symptoms of Listed Potential Problems Will be Absent, Minimized or Managed (Pneumonia)  Outcome: Ongoing (interventions implemented as appropriate)

## 2018-04-17 NOTE — PROGRESS NOTES
"     LOS: 2 days   Patient Care Team:  Luciano Paniagua MD as PCP - General  Luciano Paniagua MD as PCP - Family Medicine  Luciano Paniagua MD as PCP - Claims Attributed  Luciano Paniagua MD as Referring Physician (Family Medicine)  Candelario Velez MD as Consulting Physician (Otolaryngology)    Chief Complaint:  Shortness of breath    Subjective      HISTORY OF PRESENT ILLNESS: She has several chronic illnesses that include nocturnal hypoxemia, HTN, DM2-generally controlled, cardiac arrhythmia with few PVC on holter 2016. She says she has some degree of chronic cough; she has never smoked.  Around 4.11.18 she developed increasing cough, tachypnea, and mild SOB.  She was seen [facility] ED with feeling she had copd excerbation; she had D dimer 1.67H, was in new A fib/rate controlled.  She had CXR that showed cardiomegaly, vascular congestion.  She was agreeable to home care; and was sent home on levaquin and steroids.  She returned 4.15.18 worse and this time was felt to have pneumonia; CT angio showed no pulmonary embolus but patchy nodular opacities bilaterally upper lobes felt to be pneumonia.  She was \"hypoxic\" and had RR 36, , lactic acid 2.4, WBC 12.58 neutrophils 89%, proBNP 4250, D dimer 0.91  (last A1c 6.90).  Admission was advised.  We continued her duonebs, Levaquin (has many allergies to antibiotics), and steroids with insulin coverage.  She was given a dose of lasix IV in ED/noting she has home po lasix.   She feels alittle better on AM rounds.  I now note EKG shows new A fib (never mentioned on ED/phone report).     Interval History: Less sob after initial treatment/oxygen continous (uses oxygen HS).  Less cough.  No nausea, vomiting, diarrhea.  Echo shows     · Left ventricular systolic function is low normal. Estimated EF = 45%.  · Left ventricular diastolic dysfunction.  · Left atrial cavity size is severely dilated.  · Moderate pulmonary hypertension is present.    Remains in " new a fib; cardiology agrees no change 40 mg lovonex, ASA 81 mg.  BS trends low.  Eating some.  No out of bed much.       Patient Complaints: none  Patient Denies:  Chest pain, SOB, any pain  History taken from: patient chart    .  Current Facility-Administered Medications:   •  amLODIPine (NORVASC) tablet 5 mg, 5 mg, Oral, Q24H, Luciano Paniagua MD, 5 mg at 04/16/18 0930  •  aspirin EC tablet 81 mg, 81 mg, Oral, Daily, Luciano Paniagua MD, 81 mg at 04/16/18 0930  •  atorvastatin (LIPITOR) tablet 10 mg, 10 mg, Oral, Nightly, Luciano Paniagua MD, 10 mg at 04/16/18 2021  •  cyanocobalamin injection 1,000 mcg, 1,000 mcg, Intramuscular, Q30 Days, Luciano Paniagua MD, 1,000 mcg at 04/16/18 1144  •  dextrose (D50W) solution 25 g, 25 g, Intravenous, Q15 Min PRN, Luciano Paniagua MD  •  dextrose (GLUTOSE) oral gel 15 g, 15 g, Oral, Q15 Min PRN, Luciano Paniagua MD  •  docusate sodium (COLACE) capsule 100 mg, 100 mg, Oral, BID PRN, Luciano Paniagua MD  •  DULoxetine (CYMBALTA) DR capsule 30 mg, 30 mg, Oral, Daily, Luciano Paniagua MD, 30 mg at 04/16/18 0930  •  enoxaparin (LOVENOX) syringe 40 mg, 40 mg, Subcutaneous, Q24H, Luciano Paniagua MD, 40 mg at 04/16/18 1534  •  glipiZIDE (GLUCOTROL) tablet 2.5 mg, 2.5 mg, Oral, QAM AC, Luciano Paniagua MD, 2.5 mg at 04/16/18 0929  •  glucagon (human recombinant) (GLUCAGEN DIAGNOSTIC) injection 1 mg, 1 mg, Subcutaneous, PRN, Luciano Paniagua MD  •  insulin lispro (humaLOG) injection 2-7 Units, 2-7 Units, Subcutaneous, TID With Meals, Luciano Paniagua MD, 3 Units at 04/16/18 2210  •  ipratropium-albuterol (DUO-NEB) nebulizer solution 3 mL, 3 mL, Nebulization, 4x Daily - RT, Luciano Paniagua MD, 3 mL at 04/16/18 2052  •  linagliptin (TRADJENTA) tablet 5 mg, 5 mg, Oral, Daily, Luciano Paniagua MD, 5 mg at 04/16/18 0930  •  losartan (COZAAR) tablet 100 mg, 100 mg, Oral, Daily, Luciano Paniagua MD, 100 mg at 04/16/18 0930  •   "multivitamin w/minerals tablet 1 tablet, 1 tablet, Oral, Daily, Luciano Paniagua MD, 1 tablet at 04/16/18 0930  •  Pharmacy to Dose enoxaparin (LOVENOX), , Does not apply, Continuous PRN, Luciano Paniagua MD  •  potassium chloride (MICRO-K) CR capsule 20 mEq, 20 mEq, Oral, BID With Meals, Luciano Paniagua MD, 20 mEq at 04/16/18 1913  •  propranolol (INDERAL) tablet 40 mg, 40 mg, Oral, TID, Luciano Paniagua MD, 40 mg at 04/16/18 2021  •  sodium chloride 0.9 % flush 1-10 mL, 1-10 mL, Intravenous, PRN, Luciano Paniagua MD  •  teriparatide (FORTEO) injection 20 mcg, 20 mcg, Subcutaneous, Daily, Luciano Paniagua MD    Review of Systems:   GENERAL:  Inactive/slower with limits, speed, stamina for age and situation. Sleep is ok. No fever now.  ENDO:  No syncope, near or diaphoretic sweaty spells.  BS lowish;  chart BS noted.  HEENT: No head injury  headache,  No vision change, Same hearing loss.  Ears without pain/drainage.  No sore throat.  No significant nasal/sinus congestion/drainage. No epistaxis.  CHEST: No chest wall tenderness or mass. Some cough,  without wheeze.  No SOB; no hemoptysis.  CV: No chest pain, palpitations, ankle edema.  GI: No heartburn, dysphagia.  No abdominal pain, diarrhea, constipation.  No rectal bleeding, or melena.    :  Voids without dysuria, or  incontinence to completion.  ORTHO: No painful/swollen joints but various on /off sore.  No  sore neck or back.    NEURO: No dizziness, weakness of extremities.  No numbness/paresthesias.  PSYCH: She notes mild memory loss.  Mood good; not anxious, depressed.     Objective     Vital Signs  /69 (BP Location: Left arm, Patient Position: Lying)   Pulse 97   Temp 98.8 °F (37.1 °C)   Resp 16   Ht 167 cm (65.75\")   Wt 60.2 kg (132 lb 11.2 oz)   SpO2 98%   BMI 21.58 kg/m²   Temp:  [98.2 °F (36.8 °C)-98.8 °F (37.1 °C)] 98.8 °F (37.1 °C)  Heart Rate:  [66-97] 97  Resp:  [16-20] 16  BP: (142-160)/(69-76) " 142/69      Intake/Output Summary (Last 24 hours) at 04/17/18 0731  Last data filed at 04/16/18 1727   Gross per 24 hour   Intake              480 ml   Output             1400 ml   Net             -920 ml       Lab Results:  Lab Results (last 24 hours)     Procedure Component Value Units Date/Time    CBC & Differential [[id number]] Collected:  04/17/18 0448    Specimen:  Blood Updated:  04/17/18 0600    Narrative:       The following orders were created for panel order CBC & Differential.  Procedure                               Abnormality         Status                     ---------                               -----------         ------                     Manual Differential[[id number]]          Abnormal            Final result               CBC Auto Differential[[id number]]        Abnormal            Final result                 Please view results for these tests on the individual orders.    CBC Auto Differential [[id number]]  (Abnormal) Collected:  04/17/18 0448    Specimen:  Blood Updated:  04/17/18 0600     WBC 6.17 10*3/mm3      RBC 4.03 (L) 10*6/mm3      Hemoglobin 11.6 (L) g/dL      Hematocrit 35.9 (L) %      MCV 89.1 fL      MCH 28.8 pg      MCHC 32.3 (L) g/dL      RDW 13.1 %      RDW-SD 42.8 fl      MPV 10.4 fL      Platelets 210 10*3/mm3     Manual Differential [[id number]]  (Abnormal) Collected:  04/17/18 0448    Specimen:  Blood Updated:  04/17/18 0600     Neutrophil % 85.9 (H) %      Lymphocyte % 8.1 (L) %      Monocyte % 4.0 %      Atypical Lymphocyte % 2.0 %      Neutrophils Absolute 5.30 10*3/mm3      Lymphocytes Absolute 0.50 (L) 10*3/mm3      Monocytes Absolute 0.25 10*3/mm3      RBC Morphology Normal     WBC Morphology Normal     Giant Platelets Mod/2+    Basic Metabolic Panel [[id number]]  (Abnormal) Collected:  04/17/18 0448    Specimen:  Blood Updated:  04/17/18 0530     Glucose 64 (L) mg/dL      BUN 22 (H) mg/dL      Creatinine 0.74 mg/dL      Sodium 141 mmol/L      Potassium 4.0 mmol/L       Chloride 100 mmol/L      CO2 35.0 (H) mmol/L      Calcium 9.3 mg/dL      eGFR Non African Amer 74 mL/min/1.73      BUN/Creatinine Ratio 29.7 (H)     Anion Gap 6.0 mmol/L     Narrative:       The MDRD GFR formula is only valid for adults with stable renal function between ages 18 and 70.    POC Glucose Once [[id number]]  (Abnormal) Collected:  04/16/18 2140    Specimen:  Blood Updated:  04/16/18 2151     Glucose 207 (H) mg/dL      Comment: : [id number] Tess AllenistaMeter ID: [id number]       POC Glucose Once [[id number]]  (Normal) Collected:  04/16/18 1805    Specimen:  Blood Updated:  04/16/18 1819     Glucose 115 mg/dL      Comment: : [id number] Master MccormickMeter ID: [id number]       Blood Culture With DINESH - Blood, [[id number]]  (Normal) Collected:  04/15/18 1208    Specimen:  Blood from Arm, Right Updated:  04/16/18 1430     Blood Culture No growth at 24 hours    Blood Culture With DINESH - Blood, [[id number]]  (Normal) Collected:  04/15/18 1210    Specimen:  Blood from Hand, Left Updated:  04/16/18 1430     Blood Culture No growth at 24 hours    Narrative:       Pediatric and Anaerobe bottles collected    POC Glucose Once [[id number]]  (Abnormal) Collected:  04/16/18 1120    Specimen:  Blood Updated:  04/16/18 1153     Glucose 177 (H) mg/dL      Comment: : [id number] Master MccormickMeter ID: [id number]       POC Glucose Once [[id number]]  (Abnormal) Collected:  04/16/18 0812    Specimen:  Blood Updated:  04/16/18 0832     Glucose 134 (H) mg/dL      Comment: : [id number] Master MccormickMeter ID: [id number]             CBC:   Results from last 7 days  Lab Units 04/17/18  0448 04/16/18  0443 04/15/18  1211 04/13/18  2049   WBC 10*3/mm3 6.17 8.55 12.58* 7.90   HEMOGLOBIN g/dL 11.6* 12.2 11.3* 11.8*   HEMATOCRIT % 35.9* 37.4 34.9* 36.6*   PLATELETS 10*3/mm3 210 233 239 204     BMP:   Results from last 7 days  Lab Units 04/17/18  0448 04/16/18  0443 04/15/18  1211 04/13/18  2049   SODIUM mmol/L 141 146* 139 141    POTASSIUM mmol/L 4.0 3.3* 4.5 4.2   CHLORIDE mmol/L 100 100 98 100   CO2 mmol/L 35.0* 34.0* 32.0* 30.0   BUN mg/dL 22* 21 32* 20   CREATININE mg/dL 0.74 0.63 0.69 0.68   GLUCOSE mg/dL 64* 44* 344* 263*   CALCIUM mg/dL 9.3 9.8 9.6 10.0   ALT (SGPT) U/L  --   --  35 25     INR:       Culture Results:   Blood Culture   Date Value Ref Range Status   04/15/2018 No growth at 24 hours  Preliminary   04/15/2018 No growth at 24 hours  Preliminary       Radiology: None    Additional Studies Reviewed: None    Physical Exam:  GENERAL:  Well nourished/developed in no acute distress. Thin  SKIN: Turgor excellent, without wound, rash, lesion.  HEENT: Normal cephalic without trauma.  Pupils equal round reactive to light. Extraocular motions full without nystagmus.     Oral cavity without growths, exudates, and moist.  Posterior pharynx without mass, obstruction, redness.  No thyromegaly, mass, tenderness, lymphadenopathy and supple.  CV: Regular rhythm.  No murmur, gallop,  edema. Posterior pulses intact.  No carotid bruits.   CHEST: No chest wall tenderness or mass.   LUNGS: Symmetric motion with clearer vs yesterday; few scattered crackles to auscultation.  No dullness to percussion  ABD: Soft, nontender without mass.   ORTHO: Symmetric extremities without swelling/point tenderness.  Full gross range of motion.    NEURO: CN 2-12 grossly intact.  Symmetric facies.   UE/LE   2-3/5 strength throughout.  Nonfocal use extremities. Speech clear.    PSYCH: Oriented x 3.  Pleasant calm, well kept.  Purposeful/directed conservation with intact short/long gross memory.     Results Review:    above    ASSESSMENT/PLAN:  Reason for admit/problems addressing acutely:  Shortness of breath  Respiratory failure-hypoxic-acute-oxygen supported (hx nocturnal hypoxemia)  Pneumonia-bilateral upper/community exposed/CT chest  Sepsis risk (RR 36, , lactic acid 2.4, WBC 12.58, neuto % 89%)-BC        neg/parameters improved  CHF-acute/chronic  diastolic  Pulmonary HTN on echo  proBNP 4250  Hyperglycemia 344-at admit; resolved  Hypoglycemia-mostly AM  A fib-new  Cough-improved  Anticoagulation-DM2, now new a fib/ASA 81 (no coumadin with age/etc)-temporary DVT  prevention lovonex  Gait decline-acute/chronic    Chronic problems to review/consider during care:  89 y/o white female-advanced age  Allergy/intolerance: see above  Procedural history: see above  Family history: see above  [id number]  Menopause  Hypertension  Diabetes 2-controlled  Anticoagulated-ASA 81/DM2  Hyperlipidemia-statin treated  Cardiac arrhythmia-prior PVCs; now new a fib  Nocturnal hypoxemia-oxygen treated  Osteoporosis (hip fx)  Macular degeneration  Glaucoma  Vision decline  Anemia-  Low B12-IM replaced  Tremor-chronic/essential  Urinary incontience  Gait decline  Recurrent wax impaction  Depression-chronic    Rx-reviewed, considered with changes as needed: stop sliding scale; so little used and  causing AM lows  Labs reviewed, considered and changes made as needed: no changes  Imaging reviewed, and need for considered: tevin CXR tomorrow  Consults needed: same; cardiology  Diet reviewed, considered and changes made as needed: no changes  Fluids reviewed, considered and changes made as needed: no changes  Increase activity: encouraged today; has PT consult  Code status: full; would change if condition worsens  Discussed possible/future discharge plans: patient expects home; may have to change  Case discussed with patient  Available to talk if needed with POA/caregiver and members care team.    Luciano Paniagua MD  18  7:31 AM

## 2018-04-17 NOTE — PLAN OF CARE
Problem: Patient Care Overview  Goal: Plan of Care Review  Outcome: Ongoing (interventions implemented as appropriate)   04/17/18 0600   Coping/Psychosocial   Plan of Care Reviewed With patient   Plan of Care Review   Progress no change   OTHER   Outcome Summary VSS, juice given for BS of 64 this am, otherwise no change

## 2018-04-17 NOTE — PROGRESS NOTES
LOS: 2 days   Patient Care Team:  Luciano Paniagua MD as PCP - General  Luciano Paniagua MD as PCP - Family Medicine  Luciano Paniagua MD as PCP - Claims Attributed  Luciano Paniagua MD as Referring Physician (Family Medicine)  Candelario Velez MD as Consulting Physician (Otolaryngology)    Chief Complaint: Active Problems:    Pneumonia of both lower lobes due to infectious organism    Subjective  Shortness of breath  Generalized weakness  Easy fatigability  Patient is anxious  Mild cough    Telemetry: no malignant arrhythmia. No significant pauses.  Atrial fibrillation with controlled ventricular response     Review of Systems   Constitutional: No chills    fatigue   No fever.   HENT: Negative.    Eyes: Negative.    Respiratory:   cough,   No chest wall soreness,   Mild shortness of breath,   no wheezing, no stridor.    Cardiovascular: Negative for chest pain,   No palpitations   No significant  leg swelling.   Gastrointestinal: Negative for abdominal distention,  No abdominal pain,   No blood in stool, constipation, diarrhea, nausea or vomiting.   Endocrine: Negative.    Genitourinary: Negative for difficulty urinating, dysuria, flank pain and hematuria.   Musculoskeletal: Negative.    Skin: Negative for rash and wound.   Allergic/Immunologic: Negative.    Neurological: Negative for dizziness, syncope, weakness,   No light-headedness or headaches.   Hematological: Does not bruise/bleed easily.   Psychiatric/Behavioral: Negative for agitation, behavioral problems, confusion, the patient does  appear to be nervous/anxious.       History:   Past Medical History:   Diagnosis Date   • Anemia    • CHF (congestive heart failure)    • Diabetes    • Hyperlipidemia    • Hypertension      Past Surgical History:   Procedure Laterality Date   • FEMUR FRACTURE SURGERY       Social History     Social History   • Marital status:      Spouse name:    • Number of children: 2   • Years of  education: 12     Occupational History   • retired      telephone co/homemaker     Social History Main Topics   • Smoking status: Never Smoker   • Smokeless tobacco: Never Used   • Alcohol use No   • Drug use: No   • Sexual activity: Defer     Other Topics Concern   • Not on file     Social History Narrative   • No narrative on file     History reviewed. No pertinent family history.    Labs:  WBC WBC   Date Value Ref Range Status   04/17/2018 6.17 4.80 - 10.80 10*3/mm3 Final   04/16/2018 8.55 4.80 - 10.80 10*3/mm3 Final   04/15/2018 12.58 (H) 4.80 - 10.80 10*3/mm3 Final      HGB Hemoglobin   Date Value Ref Range Status   04/17/2018 11.6 (L) 12.0 - 16.0 g/dL Final   04/16/2018 12.2 12.0 - 16.0 g/dL Final   04/15/2018 11.3 (L) 12.0 - 16.0 g/dL Final      HCT Hematocrit   Date Value Ref Range Status   04/17/2018 35.9 (L) 37.0 - 47.0 % Final   04/16/2018 37.4 37.0 - 47.0 % Final   04/15/2018 34.9 (L) 37.0 - 47.0 % Final      Platlets Platelets   Date Value Ref Range Status   04/17/2018 210 130 - 400 10*3/mm3 Final   04/16/2018 233 130 - 400 10*3/mm3 Final   04/15/2018 239 130 - 400 10*3/mm3 Final      MCV MCV   Date Value Ref Range Status   04/17/2018 89.1 82.0 - 98.0 fL Final   04/16/2018 89.7 82.0 - 98.0 fL Final   04/15/2018 90.2 82.0 - 98.0 fL Final        Results from last 7 days  Lab Units 04/17/18  0448 04/16/18  0443 04/15/18  1211 04/13/18  2049   SODIUM mmol/L 141 146* 139 141   POTASSIUM mmol/L 4.0 3.3* 4.5 4.2   CHLORIDE mmol/L 100 100 98 100   CO2 mmol/L 35.0* 34.0* 32.0* 30.0   BUN mg/dL 22* 21 32* 20   CREATININE mg/dL 0.74 0.63 0.69 0.68   CALCIUM mg/dL 9.3 9.8 9.6 10.0   BILIRUBIN mg/dL  --   --  0.6 0.6   ALK PHOS U/L  --   --  67 76   ALT (SGPT) U/L  --   --  35 25   AST (SGOT) U/L  --   --  44 28   GLUCOSE mg/dL 64* 44* 344* 263*     Lab Results   Component Value Date    CKMB 6.14 (H) 02/20/2016    TROPONINI <0.012 04/15/2018     PT/INR:  No results found for: PROTIME/No results found for:  INR    Imaging Results (last 72 hours)     Procedure Component Value Units Date/Time    XR Chest PA & Lateral [[id number]] Collected:  04/16/18 0753     Updated:  04/16/18 0757    Narrative:       EXAMINATION: Chest 2 views 4/16/2018     HISTORY: Follow-up     FINDINGS: Today's exam is compared to previous study of one day earlier.  Mild cardiomegaly with increased interstitial markings remain present.  No new consolidation or effusion is present. Mediastinal contours are  within normal limits.       Impression:       . Stable appearance of the chest from previous study of one  day earlier.  This report was finalized on 04/16/2018 07:54 by Dr. Ranjan Coyle MD.    XR Chest 1 View [[id number]] Collected:  04/15/18 1305     Updated:  04/15/18 1309    Narrative:       EXAMINATION: Chest 1 view 4/15/2018     HISTORY: Simple sepsis protocol     FINDINGS: Today's exam is compared to a previous study of 2 days  earlier. There is cardiomegaly with equalization of the pulmonary  vascularity. There are increased interstitial markings of the lungs  which are unchanged from the previous study. By CT examination there is  what appeared to be a patchy bilateral bronchopneumonia. There is no  effusion or free air present.       Impression:       . Stable appearance of the chest from previous study of 2  days earlier.  This report was finalized on 04/15/2018 13:06 by Dr. Ranjan Coyle MD.          Objective     Allergies   Allergen Reactions   • Penicillins Swelling and Rash       Medication Review: Performed  Current Facility-Administered Medications   Medication Dose Route Frequency Provider Last Rate Last Dose   • amLODIPine (NORVASC) tablet 5 mg  5 mg Oral Q24H Luciano Paniagua MD   5 mg at 04/17/18 0835   • aspirin EC tablet 81 mg  81 mg Oral Daily Luciano Paniagua MD   81 mg at 04/17/18 0834   • atorvastatin (LIPITOR) tablet 10 mg  10 mg Oral Nightly Luciano Paniagua MD   10 mg at 04/16/18 2021   •  cyanocobalamin injection 1,000 mcg  1,000 mcg Intramuscular Q30 Days Luciano Paniagua MD   1,000 mcg at 04/16/18 1144   • dextrose (D50W) solution 25 g  25 g Intravenous Q15 Min PRN Luciano Paniagua MD       • dextrose (GLUTOSE) oral gel 15 g  15 g Oral Q15 Min PRN Luciano Paniagua MD       • docusate sodium (COLACE) capsule 100 mg  100 mg Oral BID PRN Luciano Paniagua MD       • DULoxetine (CYMBALTA) DR capsule 30 mg  30 mg Oral Daily Luciano Paniagua MD   30 mg at 04/17/18 0834   • enoxaparin (LOVENOX) syringe 40 mg  40 mg Subcutaneous Q24H Luciano Paniagua MD   40 mg at 04/17/18 1336   • glipiZIDE (GLUCOTROL) tablet 2.5 mg  2.5 mg Oral QAM AC Luciano Paniagua MD   2.5 mg at 04/17/18 0837   • glucagon (human recombinant) (GLUCAGEN DIAGNOSTIC) injection 1 mg  1 mg Subcutaneous PRN Luciano Paniagua MD       • ipratropium-albuterol (DUO-NEB) nebulizer solution 3 mL  3 mL Nebulization 4x Daily - RT Luciano Paniagua MD   3 mL at 04/17/18 1450   • levoFLOXacin (LEVAQUIN) 500 mg/100 mL D5W (premix) (LEVAQUIN) 500 mg  500 mg Intravenous Q24H Luciano Paniagua MD   500 mg at 04/17/18 1709   • linagliptin (TRADJENTA) tablet 5 mg  5 mg Oral Daily Luciano Paniagua MD   5 mg at 04/17/18 0835   • losartan (COZAAR) tablet 100 mg  100 mg Oral Daily Luciano Paniagua MD   100 mg at 04/17/18 0837   • multivitamin w/minerals tablet 1 tablet  1 tablet Oral Daily Luciano Paniagua MD   1 tablet at 04/17/18 0834   • Pharmacy to Dose enoxaparin (LOVENOX)   Does not apply Continuous PRN Luciano Paniagua MD       • potassium chloride (MICRO-K) CR capsule 20 mEq  20 mEq Oral BID With Meals Luciano Paniagua MD   20 mEq at 04/17/18 1702   • propranolol (INDERAL) tablet 40 mg  40 mg Oral TID Luciano Paniagua MD   40 mg at 04/17/18 1513   • sodium chloride 0.9 % flush 1-10 mL  1-10 mL Intravenous PRN Luciano Paniagua MD       • teriparatide (FORTEO) injection 20 mcg  20 mcg  "Subcutaneous Daily Luciano Paniagua MD           Vital Sign Min/Max for last 24 hours  Temp  Min: 98.3 °F (36.8 °C)  Max: 98.8 °F (37.1 °C)   BP  Min: 105/87  Max: 142/69   Pulse  Min: 80  Max: 102   Resp  Min: 12  Max: 18   SpO2  Min: 96 %  Max: 98 %   No Data Recorded   Weight  Min: 60.2 kg (132 lb 11.2 oz)  Max: 60.2 kg (132 lb 11.2 oz)     Flowsheet Rows    Flowsheet Row First Filed Value   Admission Height 165.1 cm (65\") Documented at 04/15/2018 1154   Admission Weight 59 kg (130 lb) Documented at 04/15/2018 1154          Results for orders placed during the hospital encounter of 04/15/18   Adult Transthoracic Echo Complete W/ Cont if Necessary Per Protocol    Narrative · Left ventricular systolic function is low normal. Estimated EF = 45%.  · Left ventricular diastolic dysfunction.  · Left atrial cavity size is severely dilated.  · Moderate pulmonary hypertension is present.          Physical Exam:  General Appearance: Awake, alert, in no acute distress  Eyes: Pupils equal and reactive    Ears: Appear intact with no abnormalities noted  Nose: Nares normal, no drainage  Neck: supple, trachea midline, no carotid bruit and no JVD  Back: no kyphosis present,    Lungs: respirations regular, respirations even and respirations unlabored  Prolonged expiration occasional basilar crackles  Heart: normal S1, S2,  2/6 pansystolic murmur in the left sternal border,  Irregular  Abdomen: normal bowel sounds, no masses, no hepatomegaly, no splenomegaly, guarding and no rebound tenderness   Skin: no bleeding, bruising or rash  Extremities: no cyanosis  Psychiatric/Behavioral: Negative for agitation, behavioral problems, confusion, the patient does not appear to be nervous/anxious.          Results Review:   I reviewed the patient's new clinical results.  I reviewed the patient's new imaging results and agree with the interpretation.  I reviewed the patient's other test results and agree with the interpretation  I " personally viewed and interpreted the patient's EKG/Telemetry data  Discussed with patient, and present family member(s)     Assessment/Plan     Active Problems:    Pneumonia of both lower lobes due to infectious organism  Active Problems:    Pneumonia of both lower lobes due to infectious organism  Diagnosed atrial fibrillation  Shortness of breath  Hypertension  Diabetes mellitus  Hypoxemia  Pneumonia  Mild hypokalemia  Severe left atrial enlargement.  Left ventricular diastolic dysfunction  Moderate pulmonary hypertension  Osteoporosis  Glaucoma     Plan    No additional recommendation  Once improved from internal medicine point of view can be discharged  See me in follow-up in office in 4-6 weeks  Continue current medication including cardiac medications consisting of Inderal, losartan, atorvastatin and amlodipine  No anticoagulation due to very high fall risk with gait instability and generalized weakness I discussed this with her family members earlier  Continue current medications  Given her advanced age and in general her wanting conservative medical therapy will not pursue any ischemia workup currently untreated unless she has symptoms suggestive of ischemic chest pain  Supportive care  Telemetry  Optimal medical therapy  Deep vein thrombosis prophylaxis/precautions  Appropriate diet, fluid, sodium, caffeine, stimulants intake   Compliance to diet and medications   Avoid NSAIDS      Delmar Garzon MD  04/17/18  5:23 PM

## 2018-04-18 NOTE — PLAN OF CARE
Problem: Patient Care Overview  Goal: Plan of Care Review  Outcome: Ongoing (interventions implemented as appropriate)   04/18/18 0209   Coping/Psychosocial   Plan of Care Reviewed With patient   Plan of Care Review   Progress improving   OTHER   Outcome Summary VSS, no change, feeling better, will continue to monitor.

## 2018-04-18 NOTE — PLAN OF CARE
Problem: Patient Care Overview  Goal: Plan of Care Review  Outcome: Ongoing (interventions implemented as appropriate)   04/18/18 2409   Coping/Psychosocial   Plan of Care Reviewed With patient   Plan of Care Review   Progress improving   OTHER   Outcome Summary VSS.Pt on room air. No c/o pain. Will continue to monitor. Afib on tele.       Problem: Fall Risk (Adult)  Goal: Identify Related Risk Factors and Signs and Symptoms  Outcome: Outcome(s) achieved Date Met: 04/18/18    Goal: Absence of Fall  Outcome: Ongoing (interventions implemented as appropriate)      Problem: Skin Injury Risk (Adult)  Goal: Identify Related Risk Factors and Signs and Symptoms  Outcome: Outcome(s) achieved Date Met: 04/18/18    Goal: Skin Health and Integrity  Outcome: Ongoing (interventions implemented as appropriate)      Problem: Pneumonia (Adult)  Goal: Signs and Symptoms of Listed Potential Problems Will be Absent, Minimized or Managed (Pneumonia)  Outcome: Ongoing (interventions implemented as appropriate)

## 2018-04-18 NOTE — PROGRESS NOTES
"     LOS: 3 days   Patient Care Team:  Luciano Paniagua MD as PCP - General  Luciano Paniagua MD as PCP - Family Medicine  Luciano Paniagua MD as PCP - Claims Attributed  Luciano Paniagua MD as Referring Physician (Family Medicine)  Candelario Velez MD as Consulting Physician (Otolaryngology)    Chief Complaint:  Shortness of breath    Subjective    HISTORY OF PRESENT ILLNESS: She has several chronic illnesses that include nocturnal hypoxemia, HTN, DM2-generally controlled, cardiac arrhythmia with few PVC on holter 2016. She says she has some degree of chronic cough; she has never smoked. Around 4.11.18 she developed increasing cough, tachypnea, and mild SOB.  She was seen [facility] with feeling she had copd excerbation; she had D dimer 1.67H, was in new A fib/rate controlled.  She had CXR that showed cardiomegaly, vascular congestion.  She was agreeable to home care; and was sent home on levaquin and steroids.  She returned 4.15.18 worse and this time was felt to have pneumonia; CT angio showed no pulmonary embolus but patchy nodular opacities bilaterally upper lobes felt to be pneumonia.  She was \"hypoxic\" and had RR 36, , lactic acid 2.4, WBC 12.58 neutrophils 89%, proBNP 4250, D dimer 0.91  (last A1c 6.90).  Admission was advised.  Since we continued her duonebs, Levaquin (has many allergies to antibiotics), and steroids with insulin coverage.  She was given a dose of lasix IV in ED/noting and has since been moved back to po lasix.     Interval History: Less cough, SOB (with continuous oxygen), wheeze, malaise.  Denies chest pain, palpitations, leg edema, diarrhea; anything.       .  Current Facility-Administered Medications:   •  amLODIPine (NORVASC) tablet 5 mg, 5 mg, Oral, Q24H, Luciano Paniagua MD, 5 mg at 04/17/18 0835  •  aspirin EC tablet 81 mg, 81 mg, Oral, Daily, Luciano Paniagua MD, 81 mg at 04/17/18 0834  •  atorvastatin (LIPITOR) tablet 10 mg, 10 mg, Oral, Nightly, " Luciano Paniagua MD, 10 mg at 04/17/18 2047  •  cyanocobalamin injection 1,000 mcg, 1,000 mcg, Intramuscular, Q30 Days, Luciano Paniagua MD, 1,000 mcg at 04/16/18 1144  •  dextrose (D50W) solution 25 g, 25 g, Intravenous, Q15 Min PRN, Luciano Paniagua MD  •  dextrose (GLUTOSE) oral gel 15 g, 15 g, Oral, Q15 Min PRN, Luciano Paniagua MD  •  docusate sodium (COLACE) capsule 100 mg, 100 mg, Oral, BID PRN, Luciano Paniagua MD  •  DULoxetine (CYMBALTA) DR capsule 30 mg, 30 mg, Oral, Daily, Luciano aPniagua MD, 30 mg at 04/17/18 0834  •  enoxaparin (LOVENOX) syringe 40 mg, 40 mg, Subcutaneous, Q24H, Luciano Paniagua MD, 40 mg at 04/17/18 1336  •  glipiZIDE (GLUCOTROL) tablet 2.5 mg, 2.5 mg, Oral, QAM AC, Luciano Paniagua MD, 2.5 mg at 04/17/18 0837  •  glucagon (human recombinant) (GLUCAGEN DIAGNOSTIC) injection 1 mg, 1 mg, Subcutaneous, PRN, Luciano Paniagua MD  •  ipratropium-albuterol (DUO-NEB) nebulizer solution 3 mL, 3 mL, Nebulization, 4x Daily - RT, Luciano Paniagua MD, 3 mL at 04/18/18 0725  •  levoFLOXacin (LEVAQUIN) 500 mg/100 mL D5W (premix) (LEVAQUIN) 500 mg, 500 mg, Intravenous, Q24H, Luciano Paniagua MD, 500 mg at 04/17/18 1709  •  linagliptin (TRADJENTA) tablet 5 mg, 5 mg, Oral, Daily, Luciano Paniagua MD, 5 mg at 04/17/18 0835  •  losartan (COZAAR) tablet 100 mg, 100 mg, Oral, Daily, Luciano Paniagua MD, 100 mg at 04/17/18 0837  •  multivitamin w/minerals tablet 1 tablet, 1 tablet, Oral, Daily, Luciano Paniagua MD, 1 tablet at 04/17/18 0806  •  Pharmacy to Dose enoxaparin (LOVENOX), , Does not apply, Continuous PRN, Luciano Paniagua MD  •  potassium chloride (MICRO-K) CR capsule 20 mEq, 20 mEq, Oral, BID With Meals, Luciano Paniagua MD, 20 mEq at 04/17/18 1702  •  propranolol (INDERAL) tablet 40 mg, 40 mg, Oral, TID, Luciano Paniagua MD, 40 mg at 04/17/18 2047  •  sodium chloride 0.9 % flush 1-10 mL, 1-10 mL, Intravenous, PRN, Luciano Stinson  "MD Siva  •  teriparatide (FORTEO) injection 20 mcg, 20 mcg, Subcutaneous, Daily, Luciano Paniagua MD    Review of Systems:   GENERAL:  Inactive/slower with limits, speed, stamina for age and only up bedside. Sleep is ok. No fever now.  ENDO:  No syncope, near or diaphoretic sweaty spells.  BS better; chart BS noted.  HEENT: No head injury  headache,  No vision change, Same mild hearing loss.  Ears without pain/drainage.  No sore throat.  No  significant nasal/sinus congestion/drainage. No epistaxis.  CHEST: No chest wall tenderness or mass. No significant cough, without wheeze.  No SOB; no hemoptysis.  CV: No chest pain, palpitations, ankle edema.  GI: No heartburn, dysphagia.  No abdominal pain, diarrhea, constipation.  No rectal bleeding, or melena.    :  Voids without dysuria, or  incontinence to completion.  ORTHO: No painful/swollen joints but various on /off sore.  No  sore neck or back.    NEURO: No dizziness, weakness of extremities.  No numbness/paresthesias.  PSYCH: ? Mild memory loss.  Mood good; not anxious, depressed.     Objective     Vital Signs  /55 (BP Location: Right arm, Patient Position: Lying)   Pulse 80   Temp 97.2 °F (36.2 °C) (Temporal Artery )   Resp 16   Ht 167 cm (65.75\")   Wt 59.1 kg (130 lb 3.2 oz)   SpO2 99%   BMI 21.18 kg/m²   Temp:  [97.2 °F (36.2 °C)-98.3 °F (36.8 °C)] 97.2 °F (36.2 °C)  Heart Rate:  [] 80  Resp:  [12-18] 16  BP: (127-139)/(55-82) 139/55      Intake/Output Summary (Last 24 hours) at 04/18/18 0817  Last data filed at 04/17/18 1709   Gross per 24 hour   Intake              100 ml   Output              525 ml   Net             -425 ml       Lab Results:  Lab Results (last 24 hours)     Procedure Component Value Units Date/Time    Basic Metabolic Panel [[id number]]  (Abnormal) Collected:  04/18/18 0413    Specimen:  Blood Updated:  04/18/18 0526     Glucose 249 (H) mg/dL      BUN 20 mg/dL      Creatinine 0.71 mg/dL      Sodium 138 mmol/L      " Potassium 5.0 mmol/L      Chloride 100 mmol/L      CO2 30.0 mmol/L      Calcium 9.5 mg/dL      eGFR Non African Amer 77 mL/min/1.73      BUN/Creatinine Ratio 28.2 (H)     Anion Gap 8.0 mmol/L     Narrative:       The MDRD GFR formula is only valid for adults with stable renal function between ages 18 and 70.    CBC & Differential [[id number]] Collected:  04/18/18 0413    Specimen:  Blood Updated:  04/18/18 0451    Narrative:       The following orders were created for panel order CBC & Differential.  Procedure                               Abnormality         Status                     ---------                               -----------         ------                     CBC Auto Differential[[id number]]        Abnormal            Final result                 Please view results for these tests on the individual orders.    CBC Auto Differential [[id number]]  (Abnormal) Collected:  04/18/18 0413    Specimen:  Blood Updated:  04/18/18 0451     WBC 6.79 10*3/mm3      RBC 4.07 (L) 10*6/mm3      Hemoglobin 11.9 (L) g/dL      Hematocrit 36.5 (L) %      MCV 89.7 fL      MCH 29.2 pg      MCHC 32.6 (L) g/dL      RDW 13.1 %      RDW-SD 43.1 fl      MPV 10.5 fL      Platelets 211 10*3/mm3      Neutrophil % 74.6 %      Lymphocyte % 12.4 (L) %      Monocyte % 11.6 %      Eosinophil % 0.7 %      Basophil % 0.1 %      Immature Grans % 0.6 %      Neutrophils, Absolute 5.06 10*3/mm3      Lymphocytes, Absolute 0.84 10*3/mm3      Monocytes, Absolute 0.79 10*3/mm3      Eosinophils, Absolute 0.05 10*3/mm3      Basophils, Absolute 0.01 10*3/mm3      Immature Grans, Absolute 0.04 (H) 10*3/mm3      nRBC 0.0 /100 WBC     Blood Culture With DINESH - Blood, [[id number]]  (Normal) Collected:  04/15/18 1208    Specimen:  Blood from Arm, Right Updated:  04/17/18 1230     Blood Culture No growth at 2 days    Blood Culture With DINESH - Blood, [[id number]]  (Normal) Collected:  04/15/18 1210    Specimen:  Blood from Hand, Left Updated:  04/17/18 1230      Blood Culture No growth at 2 days    Narrative:       Pediatric and Anaerobe bottles collected    POC Glucose Once [[id number]]  (Abnormal) Collected:  04/17/18 0833    Specimen:  Blood Updated:  04/17/18 0844     Glucose 136 (H) mg/dL      Comment: : [id number] Norman (Francis) ID: [id number]             CBC:   Results from last 7 days  Lab Units 04/18/18  0413 04/17/18  0448 04/16/18  0443 04/15/18  1211 04/13/18  2049   WBC 10*3/mm3 6.79 6.17 8.55 12.58* 7.90   HEMOGLOBIN g/dL 11.9* 11.6* 12.2 11.3* 11.8*   HEMATOCRIT % 36.5* 35.9* 37.4 34.9* 36.6*   PLATELETS 10*3/mm3 211 210 233 239 204     BMP:   Results from last 7 days  Lab Units 04/18/18  0413 04/17/18 0448 04/16/18  0443 04/15/18  1211 04/13/18  2049   SODIUM mmol/L 138 141 146* 139 141   POTASSIUM mmol/L 5.0 4.0 3.3* 4.5 4.2   CHLORIDE mmol/L 100 100 100 98 100   CO2 mmol/L 30.0 35.0* 34.0* 32.0* 30.0   BUN mg/dL 20 22* 21 32* 20   CREATININE mg/dL 0.71 0.74 0.63 0.69 0.68   GLUCOSE mg/dL 249* 64* 44* 344* 263*   CALCIUM mg/dL 9.5 9.3 9.8 9.6 10.0   ALT (SGPT) U/L  --   --   --  35 25     INR:       Culture Results:   Blood Culture   Date Value Ref Range Status   04/15/2018 No growth at 2 days  Preliminary   04/15/2018 No growth at 2 days  Preliminary       Radiology: None    Additional Studies Reviewed: None    Physical Exam:  GENERAL:  Well nourished/developed in no acute distress.  SKIN: Turgor excellent, without wound, rash, lesion.  HEENT: Normal cephalic without trauma.  Pupils equal round reactive to light. Extraocular motions full without nystagmus.     External canals nonobstructive nontender without reddness.  Oral cavity without growths, exudates, and moist.  Posterior pharynx without mass, obstruction, redness.  No thyromegaly, mass, tenderness, lymphadenopathy and supple.  CV: Irregular irregular rhythm.  No murmur, gallop,  edema. Posterior pulses intact.  No carotid bruits.   CHEST: No chest wall tenderness or mass.   LUNGS:  Symmetric motion with clear to auscultation.  No dullness to percussion  ABD: Soft, nontender without mass.   ORTHO: Symmetric extremities without swelling/point tenderness.  Full gross range of motion.    NEURO: CN 2-12 grossly intact.  Symmetric facies.  UE/LE   2/5 strength throughout.  Nonfocal use extremities. Speech clear.    PSYCH: Oriented x 3.  Pleasant calm, well kept.  Purposeful/directed conservation with intact short/long gross memory.     Results Review:  above    ASSESSMENT/PLAN:  Reason for admit/problems addressing acutely:  Reason for admit/problems addressing acutely:  Shortness of breath  Respiratory failure-hypoxic-acute-oxygen supported (hx nocturnal hypoxemia)  Pneumonia-bilateral upper/community exposed/CT chest  Sepsis risk (RR 36, , lactic acid 2.4, WBC 12.58, neuto % 89%)-BC           neg/parameters improved  CHF-acute/chronic diastolic  Pulmonary HTN on echo  proBNP 4250  Hyperglycemia 344-at admit; resolved  Hypoglycemia-mostly AM  A fib-new  Cough-improved  Anticoagulation-DM2, now new a fib/ASA 81 (no coumadin with age/etc)-temporary DVT            prevention lovonex  Gait decline-acute/chronic    Chronic problems to review/consider during care:  89 y/o white female-advanced age  Allergy/intolerance: see above  Procedural history: see above  Family history: see above  [id number]  Menopause  Hypertension  Diabetes 2-controlled  Anticoagulated-ASA 81/DM2  Hyperlipidemia-statin treated  Cardiac arrhythmia-prior PVCs; now new a fib  Nocturnal hypoxemia-oxygen treated  Osteoporosis (hip fx)  Macular degeneration  Glaucoma  Vision decline  Anemia-  Low B12-IM replaced  Tremor-chronic/essential  Urinary incontience  Gait decline  Recurrent wax impaction  Depression-chronic    Rx-reviewed, considered with changes as needed: transition oral  Labs reviewed, considered and changes made as needed: no changes daily BMP  Imaging reviewed, and need for considered: CXR today  Consults needed:  cardio signed off  Diet reviewed, considered and changes made as needed: no changes  Fluids reviewed, considered and changes made as needed: oral  Increase activity: PT/encouraged  Code status: full  Discussed possible/future discharge plans: patient expects home   Case discussed with daughter by phone  Available to talk if needed with family/caregiver and members care team.    Luciano Paniagua MD  04/18/18  8:17 AM

## 2018-04-18 NOTE — PROGRESS NOTES
"Pharmacy Dosing Service  Automatic Renal Adjustment  Levaquin    Assessment/Action/Plan:  Based on prescribing information provided by the drug , Levaquin 500 mg PO every 24 hours, has been changed to 250 mg PO every 24 hours. Pharmacy will continue to monitor daily and make further adjustment(s) accordingly.     Subjective:  Kortney Henson is a 89 y.o. female     Additional Factors Considered:  • Patient disposition per documentation  • Disease state or condition being treated    Objective:  Ht: 167 cm (65.75\"); Wt: 59.1 kg (130 lb 3.2 oz)  Estimated Creatinine Clearance: 43.6 mL/min (by C-G formula based on SCr of 0.71 mg/dL).   Lab Results   Component Value Date    CREATININE 0.71 04/18/2018    CREATININE 0.74 04/17/2018    CREATININE 0.63 04/16/2018       SMITA Trujillo RPH  04/18/18 8:58 AM    "

## 2018-04-18 NOTE — PROGRESS NOTES
Continued Stay Note  [facility]     Patient Name: Kortney Henson  MRN: [id number]  Today's Date: 4/18/2018    Admit Date: 4/15/2018          Discharge Plan     Row Name 04/18/18 1356       Plan    Plan Home with [facility]    Patient/Family in Agreement with Plan yes    Plan Comments Rec'd referral that daughter wants pt to have home health. Spoke with pt and her daughter in the room. Pt says she will agree to home health for a short time for SN. She does not feel she will need therapy. Informed her of options and she requests [facility]  but they are not taking new SN pts at this time. She then requested [facility]. Will make the referral when specific orders rec'd.               Discharge Codes    No documentation.           NIC Estevez

## 2018-04-18 NOTE — PROGRESS NOTES
Continued Stay Note  [facility]     Patient Name: Kortney Henson  MRN: [id number]  Today's Date: 4/18/2018    Admit Date: 4/15/2018          Discharge Plan     Row Name 04/18/18 1411       Plan    Plan Home    Patient/Family in Agreement with Plan yes    Plan Comments Was called back to pt's room to speak with her and her daughter. She has decided to go home with her daughter, who does not live in this area. She wants to wait on home health and will want home health when she comes back home from her daughter's home. Informed her she would have to call Dr. Paniagua at that time to arrange.     Row Name 04/18/18 1354       Plan    Plan Home with [facility]    Patient/Family in Agreement with Plan yes    Plan Comments Rec'd referral that daughter wants pt to have home health. Spoke with pt and her daughter in the room. Pt says she will agree to home health for a short time for SN. She does not feel she will need therapy. Informed her of options and she requests [facility]  but they are not taking new SN pts at this time. She then requested [facility]. Will make the referral when specific orders rec'd.               Discharge Codes    No documentation.           NIC Estevez

## 2018-04-18 NOTE — PROGRESS NOTES
Exercise Oximetry    Patient Name:Krotney Henson   MRN: [id number]   Date: 04/18/18             ROOM AIR BASELINE   SpO2% 93   Heart Rate    Blood Pressure      EXERCISE ON ROOM AIR SpO2% EXERCISE ON O2 @  LPM SpO2%   1 MINUTE 91 1 MINUTE    2 MINUTES 90 2 MINUTES    3 MINUTES 90 3 MINUTES    4 MINUTES  4 MINUTES    5 MINUTES  5 MINUTES    6 MINUTES  6 MINUTES               Distance Walked   Distance Walked   Dyspnea (Flo Scale)   Dyspnea (Flo Scale)   Fatigue (Flo Scale)   Fatigue (Flo Scale)   SpO2% Post Exercise   SpO2% Post Exercise   HR Post Exercise   HR Post Exercise   Time to Recovery   Time to Recovery     Comments:

## 2018-04-19 NOTE — DISCHARGE INSTR - ACTIVITY
Gradually increase activity.   No driving.  Safety device when up: cane, walker as needed.  Elevate legs as much as can/avoid legs handing down.  Let no one smoke around her.

## 2018-04-19 NOTE — DISCHARGE SUMMARY
"Patient ID: Kortney Henson  MRN: [id number]     Acct:  [id number]    Admit Date: 4/15/2018   Discharge Date: 4.19.18  Date of service: 4.19.18    Consults:    IP CONSULT TO CARDIOLOGY  IP CONSULT TO CASE MANAGEMENT     PATIENT PROFILE: The patient is a 89 y/o white  female ; she was cooperative.      CHIEF COMPLAINT: \"shortness of breath\"     HISTORY OF PRESENT ILLNESS: She has several chronic illnesses that include nocturnal hypoxemia, HTN, DM2-generally controlled, cardiac arrhythmia with few PVC on holter 2016. She says she has some degree of chronic cough; she has never smoked.  Around 4.11.18 she developed increasing cough, tachypnea, and mild SOB.  She was seen [facility] ED with feeling she had copd excerbation; she had D dimer 1.67H, was in new A fib/rate controlled.  She had CXR that showed cardiomegaly, vascular congestion.  She was agreeable to home care; and was sent home on levaquin and steroids.  She returned 4.15.18 worse and this time was felt to have pneumonia; CT angio showed no pulmonary embolus but patchy nodular opacities bilaterally upper lobes felt to be pneumonia.  She was \"hypoxic\" and had RR 36, , lactic acid 2.4, WBC 12.58 neutrophils 89%, proBNP 4250, D dimer 0.91  (last A1c 6.90).  Admission was advised.  We continued her duonebs, Levaquin (has many allergies to antibiotics), and steroids with insulin coverage.  She was given a dose of lasix IV in ED/noting she has home po lasix.  She feels alittle better on AM rounds.  I now note EKG shows new A fib (never mentioned on ED/phone report).           Allergies   Allergen Reactions   • Penicillins Swelling and Rash         HOME MEDICATIONS:          Prior to Admission medications    Medication Sig Start Date End Date Taking? Authorizing Provider   amLODIPine (NORVASC) 5 MG tablet TAKE ONE TABLET TWICE DAILY GENERIC FOR NORVASC 1/19/18     Luciano Paniagua MD   aspirin 81 MG EC tablet Take 81 mg by mouth Daily.   "     Historical Provider, MD   atorvastatin (LIPITOR) 10 MG tablet TAKE ONE TABLET AT BEDTIME GENERIC FOR LIPITOR 1/19/18     Luciano Paniagua MD   Calcium Carb-Cholecalciferol (CALCIUM-VITAMIN D) 600-400 MG-UNIT tablet Take  by mouth 2 (Two) Times a Day.       Historical Provider, MD   docusate sodium (COLACE) 100 MG capsule Take 100 mg by mouth 2 (Two) Times a Day As Needed for constipation.       Historical Provider, MD   DULoxetine (CYMBALTA) 30 MG capsule TAKE 1 CAPSULE DAILY GENERIC FOR CYMBALTA 1/19/18     Luciano Paniagua MD   furosemide (LASIX) 20 MG tablet TAKE 1 TABLET DAILY GENERIC FOR LASIX 1/19/18     Luciano Paniagua MD   glimepiride (AMARYL) 4 MG tablet TAKE ONE TABLET EVERY MORNING GENERIC FOR AMARYL 1/19/18     Luciano Paniagua MD   glucose blood (COOL BLOOD GLUCOSE TEST STRIPS) test strip USE AS DIRECTED DAILY 9/14/16     Luciano Paniagua MD   JANUVIA 100 MG tablet TAKE ONE TABLET DAILY 1/19/18     Luciano Paniagua MD   levoFLOXacin (LEVAQUIN) 750 MG tablet Take 1 tablet by mouth Daily. 4/14/18     Kyaw Osborn MD   losartan (COZAAR) 100 MG tablet TAKE ONE TABLET DAILY GENERIC FOR COZAAR 1/19/18     Luciano Panigaua MD   metFORMIN ER (GLUCOPHAGE-XR) 500 MG 24 hr tablet TAKE  2 TABLETS DAILY WITH EVENING MEAL GENERIC FOR GLUCOPHAGE ER( DOSE IN  REHAB CENTER) 1/19/18     Luciano Paniagua MD   Multiple Vitamins-Minerals (WOMENS MULTI VITAMIN & MINERAL) tablet Take  by mouth Daily.       Historical Provider, MD   O2 (OXYGEN) Inhale 2 L/min Every Night. Per nasal cannula       Historical Provider, MD   potassium chloride (MICRO-K) 10 MEQ CR capsule TAKE 1 CAPSULE DAILY (WITH LASIX) 1/19/18     Luciano Paniagua MD   predniSONE (DELTASONE) 20 MG tablet Take 2 tablets by mouth daily for 5 days. 4/14/18     Kyaw Osborn MD   propranolol (INDERAL) 40 MG tablet TAKE ONE TABLET THREE TIMES DAILY GENERIC FOR INDERAL 1/19/18     Luciano Paniagua MD  "  raNITIdine (ZANTAC) 150 MG tablet TAKE ONE TABLET TWICE DAILY GENERIC FOR ZANTAC 18     Luciano Paniagua MD   teriparatide (FORTEO) 600 MCG/2.4ML injection Inject 20 mcg under the skin Daily. Dr Magana 16     Historical Provider, MD         PAST HISTORY:  PROCEDURES:  [id number]     Colonoscopy/[facility]/?     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30  Children/2     PATIENT EDUCATION:     ADVISED:  Mammogram/08+  Bone density/08+  Colonoscopy/08+  Cologuard/3.15     OTHER:  last flu shot/10-27-15/[facility]     HOSPITAL ADMITS:   :      HOSPITAL NOTES: [facility]  2..16-2..16  L hip fracture  osteoporosis  DM-2/controlled  hypertension  hyperlipidemia  arrythmia-undefined  vitamin D deficiency  macular degeneration  glaucoma  anticoagulation-ASA for DM/xarelto for hip fx  (to [facility])      [facility]:   none     [facility]:   none     PHYSICAL EXAMINATION:  /72 (BP Location: Left arm, Patient Position: Lying)   Pulse 109   Temp 99 °F (37.2 °C) (Temporal Artery )   Resp 18   Ht 167 cm (65.75\")   Wt 59.4 kg (131 lb)   SpO2 96%   BMI 21.31 kg/m²      GENERAL:  Well nourished/developed in no acute distress. Thin  SKIN: Turgor excellent, without wound, rash, lesion.  HEENT: Normal cephalic without trauma.  Pupils equal round reactive to light. Extraocular motions full without nystagmus.   Oral cavity without growths, exudates, and moist.  Posterior pharynx without mass, obstruction, redness.  No thyromegaly, mass, tenderness, lymphadenopathy and supple.  CV: Regular rhythm-tachy   " systolic murmur without gallop,  edema. Posterior pulses intact.  No carotid bruits.   CHEST: No chest wall tenderness or mass.   LUNGS: Symmetric motion with bilateral rhonchi to auscultation.  No dullness to percussion  ABD: Soft, nontender without mass.   ORTHO: Symmetric extremities without swelling/point tenderness.  Full gross range of motion.    NEURO: CN 2-12 grossly intact.  Symmetric facies. 1/4 x bicep  equal reflexes.  UE/LE   3/5 strength throughout.  Nonfocal use extremities. Speech clear.    PSYCH: Oriented x 3.  Pleasant calm, well kept.  Purposeful/directed conservation with intact short/long gross memory.     ASSESSMENT/PROBLEM LIST:   89 y/o white female-advanced age  Allergy/intolerance: see above  Procedural history: see above  Family history: see above  [id number]  Menopause  Hypertension  Diabetes 2-controlled  Anticoagulated-ASA 81/DM2  Hyperlipidemia-statin treated  Cardiac arrhythmia-prior PVCs; now new a fib  Nocturnal hypoxemia-oxygen treated  Osteoporosis (hip fx)  Macular degeneration  Glaucoma  Vision decline  Anemia-  Low B12-IM replaced  Tremor-chronic/essential  Urinary incontience  Gait decline  Recurrent wax impaction  Depression-chronic     REASON FOR ADMISSION:    Shortness of breath  Respiratory failure-acute  Pneumonia-bilateral upper/community exposed/CT chest  Sepsis risk (RR 36, , lactic acid 2.4, WBC 12.58, neuto % 89%)  Hyperglycemia 344  proBNP 4250  A fib-new  cough     HOSPITAL COARSE: She cannot be treated aggressively with fluids due to concern that she had degrees of CHF.  Fortunately her blood pressure did not go significantly low.  She was treated with IV antibiotics, DuoNeb updraft therapy, and briefly steroids.  Her blood sugar did elevate and we added sliding scale insulin.  Her blood sugar then began have daily morning lows; he had a rather rapidly reduce the insulin given.  Chest x-rays were followed and showed improvement.  Her cough improved.  By the  time of discharge she was tested for oxygen needs and had none except anticipating that she continued to be on her nocturnal oxygen 1 sent home.  Cardiac testing showed a significantly enlarged left atrium preserved left ventricular function and she remained in her new atrial fib.  Cardiology (Dr. Garzon) did not think she was a candidate for cardioversion but simply rate control.  Dr. Garzon did not think she was a candidate for anticoagulation due to her advanced age and the risk for falls.  It was discussed with her daughter that this raises the risks for her having stroke; something we really cannot do anything about.  She did have therapy while here; her gait here seems currently stable.  She has all the equipment she needs for discharge.    Labs included:     Labs  Lab Results (last 24 hours)     Procedure Component Value Units Date/Time    Blood Culture With DINESH - Blood, [[id number]]  (Normal) Collected:  04/15/18 1208    Specimen:  Blood from Arm, Right Updated:  04/19/18 1230     Blood Culture No growth at 4 days    Blood Culture With DINESH - Blood, [[id number]]  (Normal) Collected:  04/15/18 1210    Specimen:  Blood from Hand, Left Updated:  04/19/18 1230     Blood Culture No growth at 4 days    Narrative:       Pediatric and Anaerobe bottles collected          Lab Results:  CBC:     Results from last 7 days  Lab Units 04/18/18  0413 04/17/18 0448 04/16/18  0443 04/15/18  1211 04/13/18  2049   WBC 10*3/mm3 6.79 6.17 8.55 12.58* 7.90   HEMOGLOBIN g/dL 11.9* 11.6* 12.2 11.3* 11.8*   HEMATOCRIT % 36.5* 35.9* 37.4 34.9* 36.6*   PLATELETS 10*3/mm3 211 210 233 239 204     BMP:    Results from last 7 days  Lab Units 04/18/18  0413 04/17/18  0448 04/16/18 0443 04/15/18  1211 04/13/18  2049   SODIUM mmol/L 138 141 146* 139 141   POTASSIUM mmol/L 5.0 4.0 3.3* 4.5 4.2   CHLORIDE mmol/L 100 100 100 98 100   CO2 mmol/L 30.0 35.0* 34.0* 32.0* 30.0   BUN mg/dL 20 22* 21 32* 20   CREATININE mg/dL 0.71 0.74 0.63 0.69 0.68    GLUCOSE mg/dL 249* 64* 44* 344* 263*   CALCIUM mg/dL 9.5 9.3 9.8 9.6 10.0   ALT (SGPT) U/L  --   --   --  35 25     A1c:Invalid input(s): A1C   A1c:@LABRCNOP(A1c:7)@    GFR: Invalid input(s): EGFR NON  AMER    Culture Results:   Blood Culture   Date Value Ref Range Status   04/15/2018 No growth at 3 days  Preliminary   04/15/2018 No growth at 3 days  Preliminary       DISCHARGE ASSESSMENT:  Reasons for admit/problems address while here:   Shortness of breath  Respiratory failure-hypoxic-acute-oxygen supported (hx nocturnal hypoxemia)  Pneumonia-bilateral upper/community exposed/CT chest  Severe sepsis (RR 36, , lactic acid 2.4, WBC 12.58, neuto % 89%)-BC           neg/parameters improved  CHF-acute/chronic diastolic (proBNP 4250)  Pulmonary HTN on echo  Hyperglycemia 344-at admit;  Hypoglycemia-  Labile blood sugar  A fib-new  Cough-improved  Anticoagulation-DM2, now new a fib/ASA 81 (no coumadin with age/etc)-temporary DVT            prevention lovonex  Gait decline-acute/chronic  Hypopotassemia-diuretic affected    Chronic problems affecting stay:   89 y/o white female-advanced age  Allergy/intolerance: see above  Procedural history: see above  Family history: see above  [id number]  Menopause  Hypertension  Diabetes 2-controlled  Anticoagulated-ASA 81/DM2  Hyperlipidemia-statin treated  Cardiac arrhythmia-prior PVCs; now new a fib  Nocturnal hypoxemia-oxygen treated  Osteoporosis (hip fx)  Macular degeneration  Glaucoma  Vision decline  Anemia-chronic on/off  Low B12-IM replaced  Tremor-chronic/essential  Urinary incontience  Gait decline-chronic  Recurrent wax impaction  Depression-chronic       PLAN:   See AVS    Discharge Disposition:  Home 2 days  Then to daughter who lives near [place] for few days  Expected back [place] area about a week     Discharge Medications:   Sixto    Home Medication Instructions SARAH:[id number]    Printed on:[id number]   Medication Information                       amLODIPine (NORVASC) 5 MG tablet  Take 5 mg by mouth 2 (Two) Times a Day.             aspirin 81 MG EC tablet  Take 81 mg by mouth Daily.             atorvastatin (LIPITOR) 10 MG tablet  Take 10 mg by mouth Every Night.             Calcium Carb-Cholecalciferol (CALCIUM-VITAMIN D) 600-400 MG-UNIT tablet  Take 1 tablet by mouth 2 (Two) Times a Day.             docusate sodium (COLACE) 100 MG capsule  Take 100 mg by mouth 2 (Two) Times a Day As Needed for constipation.             DULoxetine (CYMBALTA) 30 MG capsule  Take 30 mg by mouth Daily.             glimepiride (AMARYL) 4 MG tablet  Take 4 mg by mouth Every Morning Before Breakfast.             glucose blood (COOL BLOOD GLUCOSE TEST STRIPS) test strip  USE AS DIRECTED DAILY             levoFLOXacin (LEVAQUIN) 750 MG tablet  Take 750 mg by mouth Daily. 7 day therapy. Start 4/14/18.             losartan (COZAAR) 100 MG tablet  Take 100 mg by mouth Daily.             metFORMIN ER (GLUCOPHAGE-XR) 500 MG 24 hr tablet  Take 1,000 mg by mouth Every Evening.             Multiple Vitamins-Minerals (WOMENS MULTI VITAMIN & MINERAL) tablet  Take 1 tablet by mouth Daily.             predniSONE (DELTASONE) 20 MG tablet  Take 1 tablet by mouth Daily. 5 day therapy. Start 4/14/18.             propranolol (INDERAL) 40 MG tablet  Take 40 mg by mouth 3 (Three) Times a Day.             raNITIdine (ZANTAC) 150 MG tablet  Take 150 mg by mouth 2 (Two) Times a Day.             SITagliptin (JANUVIA) 100 MG tablet  Take 100 mg by mouth Daily.             teriparatide (FORTEO) 600 MCG/2.4ML injection  Inject 20 mcg under the skin Daily.                 Discharge Care Plan/Instructions:   ACTIVITY:  Gradually increase activity   No driving  Safety device when up: cane, walker as needed  Elevate legs as much as can/avoid legs handing down   Let no one smoke around her    DIET:  Cardiac  Diabetic  Suggest no alcohol   Avoid caffeine     MEDICATIONS:   Per AVS  Could be Rx needed  (does not have at home); daughter or patient to let Dr Paniagua office know  If you have Rx that need signature; you should come to Dr Paniagua office to get that Rx   (nursing-you need to realize this Rx will be written at Dr Paniagua office)  Oxygen 2 liters HS/asleep    EXPECT:   LAB when see Dr Paniagua  Home health can be arranged when back in area; just call Dr Paniagua  Equipment same  Call if problems getting this    CALL:   If problems/questions    Follow-up Appointments:   Dr Paniagua about a week  Dr Garzon 6w  Future Appointments  Date Time Provider Department Center   5/1/2018 11:15 AM Luciano Paniagua MD [facility] None   5/14/2018 11:30 AM Luciano Paniagua MD [facility] None   5/31/2018 8:15 AM Delmar Garzon MD [facility] None       CONDITION: stable/improved    PROGNOSIS: guarded

## 2018-04-19 NOTE — PLAN OF CARE
Problem: Patient Care Overview  Goal: Plan of Care Review  Outcome: Ongoing (interventions implemented as appropriate)   04/19/18 8938   Coping/Psychosocial   Plan of Care Reviewed With patient;family   OTHER   Outcome Summary PT eval complete. pt was A&Ox4 and agreeable to therapy this AM. pt had no complaints of pain, SOB, n/v at this time. pt B LE/UE MMT were WFL, B LE LE/UE AROM were WFL with shoulder elevation being ~25% impaired. pt was supervision for bed mobility, CGA for ambulation 120ft with RW, and CGA for transfers. pt reported some fatigue upon returning to room after ambulation. pt would benefit from skilled PT at this time to address impaired functional mobility and activity tolerance. PT recommends home with home health on d/c

## 2018-04-19 NOTE — PLAN OF CARE
Problem: Patient Care Overview  Goal: Plan of Care Review  Outcome: Ongoing (interventions implemented as appropriate)   04/19/18 9763   Coping/Psychosocial   Plan of Care Reviewed With patient   Plan of Care Review   Progress improving   OTHER   Outcome Summary VSS, no changes with pt, seems to be feeling and doing better overall. Will continue to monitor

## 2018-04-19 NOTE — DISCHARGE INSTRUCTIONS
ACTIVITY:  Gradually increase activity   No driving  Safety device when up: cane, walker as needed  Elevate legs as much as can/avoid legs handing down   Let no one smoke around her    DIET:  Cardiac  Diabetic  Suggest no alcohol   Avoid caffeine     MEDICATIONS:   Per AVS  Could be Rx needed (does not have at home); daughter or patient to let Dr Paniagua office know  If you have Rx that need signature; you should come to Dr Paniagua office to get that Rx   (nursing-you need to realize this Rx will be written at Dr Paniagua office)  Oxygen 2 liters HS/asleep    EXPECT:   LAB when see Dr Paniagua  Home health can be arranged when back in area; just call Dr Paniagua  Equipment same  Call if problems getting this    CALL:   If problems/questions

## 2018-04-19 NOTE — PROGRESS NOTES
LOS: 3 days   Patient Care Team:  Luciano Paniagua MD as PCP - General  Luciano Paniagua MD as PCP - Family Medicine  Luciano Paniagua MD as PCP - Claims Attributed  Luciano Paniagua MD as Referring Physician (Family Medicine)  Candelario Velez MD as Consulting Physician (Otolaryngology)    Chief Complaint: Active Problems:    Pneumonia of both lower lobes due to infectious organism    Subjective    Similar symptoms as yesterday  Shortness of breath  Generalized weakness  Easy fatigability  Patient is anxious  Mild cough    Telemetry: no malignant arrhythmia. No significant pauses.  Atrial fibrillation with controlled ventricular response heart rates currently between 79-97 bpm    Review of Systems   Constitutional: No chills    fatigue   No fever.   HENT: Negative.    Eyes: Negative.    Respiratory:   cough,   No chest wall soreness,   Mild shortness of breath,   no wheezing, no stridor.    Cardiovascular: Negative for chest pain,   No palpitations   No significant  leg swelling.   Gastrointestinal: Negative for abdominal distention,  No abdominal pain,   No blood in stool, constipation, diarrhea, nausea or vomiting.   Endocrine: Negative.    Genitourinary: Negative for difficulty urinating, dysuria, flank pain and hematuria.   Musculoskeletal: Negative.    Skin: Negative for rash and wound.   Allergic/Immunologic: Negative.    Neurological: Negative for dizziness, syncope, weakness,   No light-headedness or headaches.   Hematological: Does not bruise/bleed easily.   Psychiatric/Behavioral: Negative for agitation, behavioral problems, confusion, the patient does  appear to be nervous/anxious.       History:   Past Medical History:   Diagnosis Date   • Anemia    • CHF (congestive heart failure)    • Diabetes    • Hyperlipidemia    • Hypertension      Past Surgical History:   Procedure Laterality Date   • FEMUR FRACTURE SURGERY       Social History     Social History   • Marital status:       Spouse name:    • Number of children: 2   • Years of education: 12     Occupational History   • retired      telephone co/homemaker     Social History Main Topics   • Smoking status: Never Smoker   • Smokeless tobacco: Never Used   • Alcohol use No   • Drug use: No   • Sexual activity: Defer     Other Topics Concern   • Not on file     Social History Narrative   • No narrative on file     History reviewed. No pertinent family history.    Labs:  WBC WBC   Date Value Ref Range Status   04/18/2018 6.79 4.80 - 10.80 10*3/mm3 Final   04/17/2018 6.17 4.80 - 10.80 10*3/mm3 Final   04/16/2018 8.55 4.80 - 10.80 10*3/mm3 Final      HGB Hemoglobin   Date Value Ref Range Status   04/18/2018 11.9 (L) 12.0 - 16.0 g/dL Final   04/17/2018 11.6 (L) 12.0 - 16.0 g/dL Final   04/16/2018 12.2 12.0 - 16.0 g/dL Final      HCT Hematocrit   Date Value Ref Range Status   04/18/2018 36.5 (L) 37.0 - 47.0 % Final   04/17/2018 35.9 (L) 37.0 - 47.0 % Final   04/16/2018 37.4 37.0 - 47.0 % Final      Platlets Platelets   Date Value Ref Range Status   04/18/2018 211 130 - 400 10*3/mm3 Final   04/17/2018 210 130 - 400 10*3/mm3 Final   04/16/2018 233 130 - 400 10*3/mm3 Final      MCV MCV   Date Value Ref Range Status   04/18/2018 89.7 82.0 - 98.0 fL Final   04/17/2018 89.1 82.0 - 98.0 fL Final   04/16/2018 89.7 82.0 - 98.0 fL Final          Results from last 7 days  Lab Units 04/18/18  0413 04/17/18  0448 04/16/18  0443 04/15/18  1211 04/13/18  2049   SODIUM mmol/L 138 141 146* 139 141   POTASSIUM mmol/L 5.0 4.0 3.3* 4.5 4.2   CHLORIDE mmol/L 100 100 100 98 100   CO2 mmol/L 30.0 35.0* 34.0* 32.0* 30.0   BUN mg/dL 20 22* 21 32* 20   CREATININE mg/dL 0.71 0.74 0.63 0.69 0.68   CALCIUM mg/dL 9.5 9.3 9.8 9.6 10.0   BILIRUBIN mg/dL  --   --   --  0.6 0.6   ALK PHOS U/L  --   --   --  67 76   ALT (SGPT) U/L  --   --   --  35 25   AST (SGOT) U/L  --   --   --  44 28   GLUCOSE mg/dL 249* 64* 44* 344* 263*     Lab Results   Component Value Date     CKMB 6.14 (H) 02/20/2016    TROPONINI <0.012 04/15/2018     PT/INR:  No results found for: PROTIME/No results found for: INR    Imaging Results (last 72 hours)     Procedure Component Value Units Date/Time    XR Chest PA & Lateral [[id number]] Collected:  04/16/18 0753     Updated:  04/16/18 0757    Narrative:       EXAMINATION: Chest 2 views 4/16/2018     HISTORY: Follow-up     FINDINGS: Today's exam is compared to previous study of one day earlier.  Mild cardiomegaly with increased interstitial markings remain present.  No new consolidation or effusion is present. Mediastinal contours are  within normal limits.       Impression:       . Stable appearance of the chest from previous study of one  day earlier.  This report was finalized on 04/16/2018 07:54 by Dr. Ranjan Coyle MD.    XR Chest 1 View [[id number]] Collected:  04/15/18 1305     Updated:  04/15/18 1309    Narrative:       EXAMINATION: Chest 1 view 4/15/2018     HISTORY: Simple sepsis protocol     FINDINGS: Today's exam is compared to a previous study of 2 days  earlier. There is cardiomegaly with equalization of the pulmonary  vascularity. There are increased interstitial markings of the lungs  which are unchanged from the previous study. By CT examination there is  what appeared to be a patchy bilateral bronchopneumonia. There is no  effusion or free air present.       Impression:       . Stable appearance of the chest from previous study of 2  days earlier.  This report was finalized on 04/15/2018 13:06 by Dr. Ranjan Coyle MD.          Objective     Allergies   Allergen Reactions   • Penicillins Swelling and Rash       Medication Review: Performed  Current Facility-Administered Medications   Medication Dose Route Frequency Provider Last Rate Last Dose   • amLODIPine (NORVASC) tablet 5 mg  5 mg Oral Q24H Luciano Paniagua MD   5 mg at 04/18/18 0900   • aspirin EC tablet 81 mg  81 mg Oral Daily Luciano Paniagua MD   81 mg at 04/18/18 0900    • atorvastatin (LIPITOR) tablet 10 mg  10 mg Oral Nightly Luciano Paniagua MD   10 mg at 04/17/18 2047   • cyanocobalamin injection 1,000 mcg  1,000 mcg Intramuscular Q30 Days Luciano Paniagua MD   1,000 mcg at 04/16/18 1144   • dextrose (D50W) solution 25 g  25 g Intravenous Q15 Min PRN Luciano Paniagua MD       • dextrose (GLUTOSE) oral gel 15 g  15 g Oral Q15 Min PRN Luciano Paniagua MD       • docusate sodium (COLACE) capsule 100 mg  100 mg Oral BID PRN Luciano Paniagua MD       • DULoxetine (CYMBALTA) DR capsule 30 mg  30 mg Oral Daily Luciano Paniagua MD   30 mg at 04/18/18 0900   • enoxaparin (LOVENOX) syringe 40 mg  40 mg Subcutaneous Q24H Luciano Paniagua MD   40 mg at 04/18/18 1510   • furosemide (LASIX) tablet 20 mg  20 mg Oral Daily Luciano Paniagua MD   20 mg at 04/18/18 0930   • glipiZIDE (GLUCOTROL) tablet 2.5 mg  2.5 mg Oral QAM AC Luciano Paniagua MD   2.5 mg at 04/18/18 0730   • glucagon (human recombinant) (GLUCAGEN DIAGNOSTIC) injection 1 mg  1 mg Subcutaneous PRN Luciano Paniagua MD       • ipratropium-albuterol (DUO-NEB) nebulizer solution 3 mL  3 mL Nebulization 4x Daily - RT Luciano Paniagua MD   3 mL at 04/18/18 1533   • levoFLOXacin (LEVAQUIN) tablet 250 mg  250 mg Oral Q24H Luciano Paniagua MD   250 mg at 04/18/18 0930   • linagliptin (TRADJENTA) tablet 5 mg  5 mg Oral Daily Luciano Paniagua MD   5 mg at 04/18/18 0900   • losartan (COZAAR) tablet 100 mg  100 mg Oral Daily Luciano Paniagua MD   100 mg at 04/18/18 0900   • multivitamin w/minerals tablet 1 tablet  1 tablet Oral Daily Luciano Paniagua MD   1 tablet at 04/18/18 0900   • Pharmacy to Dose enoxaparin (LOVENOX)   Does not apply Continuous PRN Luciano Paniagua MD       • potassium chloride (MICRO-K) CR capsule 20 mEq  20 mEq Oral BID With Meals Luciano Paniagua MD   20 mEq at 04/18/18 1846   • predniSONE (DELTASONE) tablet 10 mg  10 mg Oral Daily With Breakfast  "Luciano Paniagua MD   10 mg at 04/18/18 0930   • propranolol (INDERAL) tablet 40 mg  40 mg Oral TID Luciano Paniagua MD   40 mg at 04/18/18 1615   • sodium chloride 0.9 % flush 1-10 mL  1-10 mL Intravenous PRN Luciano Paniagua MD       • teriparatide (FORTEO) injection 20 mcg  20 mcg Subcutaneous Daily Luciano Paniagua MD           Vital Sign Min/Max for last 24 hours  Temp  Min: 97.2 °F (36.2 °C)  Max: 98.2 °F (36.8 °C)   BP  Min: 107/68  Max: 139/55   Pulse  Min: 80  Max: 115   Resp  Min: 14  Max: 18   SpO2  Min: 95 %  Max: 99 %   No Data Recorded   Weight  Min: 59.1 kg (130 lb 3.2 oz)  Max: 59.1 kg (130 lb 3.2 oz)     Flowsheet Rows    Flowsheet Row First Filed Value   Admission Height 165.1 cm (65\") Documented at 04/15/2018 1154   Admission Weight 59 kg (130 lb) Documented at 04/15/2018 1154          Results for orders placed during the hospital encounter of 04/15/18   Adult Transthoracic Echo Complete W/ Cont if Necessary Per Protocol    Narrative · Left ventricular systolic function is low normal. Estimated EF = 45%.  · Left ventricular diastolic dysfunction.  · Left atrial cavity size is severely dilated.  · Moderate pulmonary hypertension is present.          Physical Exam:  General Appearance: Awake, alert, in no acute distress  Eyes: Pupils equal and reactive    Ears: Appear intact with no abnormalities noted  Nose: Nares normal, no drainage  Neck: supple, trachea midline, no carotid bruit and no JVD  Back: no kyphosis present,    Lungs: respirations regular, respirations even and respirations unlabored  Prolonged expiration occasional basilar crackles  Heart: normal S1, S2,  2/6 pansystolic murmur in the left sternal border,  Irregular  Abdomen: normal bowel sounds, no masses, no hepatomegaly, no splenomegaly, guarding and no rebound tenderness   Skin: no bleeding, bruising or rash  Extremities: no cyanosis  Psychiatric/Behavioral: Negative for agitation, behavioral problems, confusion, " the patient does not appear to be nervous/anxious.          Results Review:   I reviewed the patient's new clinical results.  I reviewed the patient's new imaging results and agree with the interpretation.  I reviewed the patient's other test results and agree with the interpretation  I personally viewed and interpreted the patient's EKG/Telemetry data  Discussed with patient, and present family member(s)     Assessment/Plan     Active Problems:    Pneumonia of both lower lobes due to infectious organism  Active Problems:  Diagnosed atrial fibrillation  Shortness of breath  Hypertension  Diabetes mellitus  Hypoxemia  Pneumonia  Mild hypokalemia  Severe left atrial enlargement.  Left ventricular diastolic dysfunction  Moderate pulmonary hypertension  Osteoporosis  Glaucoma     Plan    No additional recommendation  Once improved from internal medicine point of view can be discharged  See me in follow-up in office in 4-6 weeks  Continue current medication including cardiac medications consisting of Inderal, losartan, atorvastatin and amlodipine  No anticoagulation due to very high fall risk with gait instability and generalized weakness I discussed this with her family members earlier  Continue current medications  Given her advanced age and in general her wanting conservative medical therapy will not pursue any ischemia workup currently untreated unless she has symptoms suggestive of ischemic chest pain  Supportive care  Telemetry  Optimal medical therapy  Deep vein thrombosis prophylaxis/precautions  Appropriate diet, fluid, sodium, caffeine, stimulants intake   Compliance to diet and medications   Avoid NSAIDS  Pulmonary issues are being addressed  Will see less often      Delmar Garzon MD  04/18/18  7:00 PM

## 2018-04-19 NOTE — THERAPY EVALUATION
Acute Care - Physical Therapy Initial Evaluation   [facility]     Patient Name: Kortney Henson  : 1927  MRN: [id number]  Today's Date: 2018   Onset of Illness/Injury or Date of Surgery: 04/15/18  Date of Referral to PT: 04/15/18  Referring Physician: Dr. Paniagua      Admit Date: 4/15/2018    Visit Dx:     ICD-10-CM ICD-9-CM   1. Pneumonia of both lower lobes due to infectious organism J18.9 483.8   2. Low vitamin B12 level-IM E53.8 266.2   3. Impaired functional mobility and activity tolerance Z74.09 V49.89     Patient Active Problem List   Diagnosis   • Wellness examination-done   • Depression   • Anemia-see B12   • Diabetes type 2, controlled   • Hypertension   • Glaucoma   • Hyperlipidemia-statin   • Nocturnal hypoxemia-nocturnal oxygen   • Macular degeneration-adequate   • Excessive cerumen in ear canal   • Tremor   • Gait difficulty   • Cardiac arrhythmia-benign holter    • Urinary incontinence   • Jeuyxypzgkip-ah-VSTL- two years   • Laboratory test*   • Low vitamin B12 level-IM   • Bilateral impacted cerumen   • Pneumonia of both lower lobes due to infectious organism     Past Medical History:   Diagnosis Date   • Anemia    • CHF (congestive heart failure)    • Diabetes    • Hyperlipidemia    • Hypertension      Past Surgical History:   Procedure Laterality Date   • FEMUR FRACTURE SURGERY          PT ASSESSMENT (last 12 hours)      Physical Therapy Evaluation     Row Name 18 1100 18 1057       PT Evaluation Time/Intention    Subjective Information --  -FLORIAN (r) CS (t) FLORIAN (c) no complaints  -FLORIAN (r) CS (t) FLORIAN (c)    Document Type  -- evaluation  -FLORIAN (r) CS (t) FLORIAN (c)    Mode of Treatment  -- physical therapy  -FLORIAN (r) CS (t) FLORIAN (c)    Patient Effort  -- good  -FLORIAN (r) CS (t) FLORIAN (c)    Row Name 18 1057          General Information    Patient Profile Reviewed? yes  -FLORIAN (r) CS (t) FLORIAN (c)     Onset of Illness/Injury or Date of Surgery 04/15/18  -FLORIAN (r) CS (t) FLORIAN (c)     Referring Physician  Dr. Paniagua  -FLORIAN (r) CS (t) FLORIAN (c)     Patient Observations alert;cooperative;agree to therapy  -FLORIAN (r) CS (t) FLORIAN (c)     Patient/Family Observations family in room   -FLORIAN (r) CS (t) FLORIAN (c)     General Observations of Patient jennifer graham SCDs  -FLORIAN (r) CS (t) FLORIAN (c)     Prior Level of Function independent:;all household mobility;gait;transfer;feeding;grooming;dressing;min assist:;bathing  -FLORIAN (r) CS (t) FLORIAN (c)     Equipment Currently Used at Home cane, straight;grab bar;walker, rolling;shower chair   grab bars on doors  -FLORIAN (r) CS (t) FLORIAN (c)     Pertinent History of Current Functional Problem 4/15 pt reported back to ED after being present 2 days prior for SOB. pt reports that it had gotten worse the day before. pt found to have PNA and has become weak and fatigues easily; PMH: Anemia, CHF, DM, HTN, femur fx surgery  -FLORIAN (r) CS (t) FLORIAN (c)     Existing Precautions/Restrictions fall  -FLORIAN (r) CS (t) FLORIAN (c)     Equipment Issued to Patient walker, front wheeled  -FLORIAN (r) CS (t) FLORIAN (c)     Risks Reviewed patient and family:;LOB;nausea/vomiting;dizziness;increased discomfort;change in vital signs  -FLORIAN (r) CS (t) FLORIAN (c)     Benefits Reviewed patient and family:;improve function;increase independence;increase strength;increase balance;decrease pain  -FLORIAN (r) CS (t) FLORIAN (c)     Barriers to Rehab none identified  -FLORIAN (r) CS (t) FLORIAN (c)     Row Name 04/19/18 1057          Relationship/Environment    Lives With alone  -FLORIAN (r) CS (t) FLORIAN (c)     Family Caregiver if Needed child(homa), adult  -FLORIAN (r) CS (t) FLORIAN (c)     Row Name 04/19/18 1057          Resource/Environmental Concerns    Current Living Arrangements home/apartment/condo  -FLORIAN (r) CS (t) FLORIAN (c)     Row Name 04/19/18 1057          Home Main Entrance    Number of Stairs, Main Entrance four  -FLORIAN (r) CS (t) FLORIAN (c)     Stair Railings, Main Entrance railing on left side (ascending)  -FLORIAN (r) CS (t) FLORIAN (c)     Row Name 04/19/18 1057          Cognitive Assessment/Interventions     Additional Documentation Cognitive Assessment/Intervention (Group)  -FLORIAN (r) CS (t) FLORIAN (c)     Row Name 04/19/18 1057          Cognitive Assessment/Intervention- PT/OT    Affect/Mental Status (Cognitive) WNL  -FLORIAN (r) CS (t) FLORIAN (c)     Orientation Status (Cognition) oriented x 4  -FLORIAN (r) CS (t) FLORIAN (c)     Follows Commands (Cognition) WNL  -FLORIAN (r) CS (t) FLORIAN (c)     Cognitive Function (Cognitive) WNL  -FLORIAN (r) CS (t) FLORIAN (c)     Personal Safety Interventions fall prevention program maintained;muscle strengthening facilitated;nonskid shoes/slippers when out of bed;gait belt  -FLORIAN (r) CS (t) FLORIAN (c)     Row Name 04/19/18 1057          Safety Issues, Functional Mobility    Impairments Affecting Function (Mobility) endurance/activity tolerance;strength  -FLORIAN (r) CS (t) FLORIAN (c)     Row Name 04/19/18 1057          Bed Mobility Assessment/Treatment    Bed Mobility Assessment/Treatment rolling right;supine-sit-supine  -FLORIAN (r) CS (t) FLORIAN (c)     Rolling Right [place] (Bed Mobility) supervision  -FLORIAN (r) CS (t) FLORIAN (c)     Supine-Sit-Supine [place] (Bed Mobility) supervision  -FLORIAN (r) CS (t) FLORIAN (c)     Assistive Device (Bed Mobility) bed rails;head of bed elevated  -FLORIAN (r) CS (t) FLORIAN (c)     Row Name 04/19/18 1057          Transfer Assessment/Treatment    Transfer Assessment/Treatment sit-stand transfer;stand-sit transfer  -FLORIAN (r) CS (t) FLORIAN (c)     Sit-Stand [place] (Transfers) contact guard  -FLORIAN (r) CS (t) FLORIAN (c)     Stand-Sit [place] (Transfers) contact guard  -FLORIAN (r) CS (t) FLORIAN (c)     Row Name 04/19/18 1057          Sit-Stand Transfer    Assistive Device (Sit-Stand Transfers) walker, front-wheeled  -FLORIAN (r) CS (t) FLORIAN (c)     Row Name 04/19/18 1057          Stand-Sit Transfer    Assistive Device (Stand-Sit Transfers) walker, front-wheeled  -FLORIAN (r) CS (t) FLORIAN (c)     Row Name 04/19/18 1057          Gait/Stairs Assessment/Training    Gait/Stairs Assessment/Training gait/ambulation assistive device  -FLORIAN (r) JASON (t) FLORIAN (c)      [place] Level (Gait) contact guard  -FLORIAN (r) CS (t) FLORIAN (c)     Assistive Device (Gait) walker, front-wheeled  -FLORIAN (r) CS (t) FLORIAN (c)     Distance in Feet (Gait) 120ft  -FLORIAN (r) CS (t) FLORIAN (c)     Deviations/Abnormal Patterns (Gait) gait speed decreased;stride length decreased;hannah decreased  -FLORIAN (r) CS (t) FLORIAN (c)     Row Name 04/19/18 1057          General ROM    GENERAL ROM COMMENTS B LE AROM WFL, B UE AROM WFL, B shoulder elevation ~25% impaired  -FLORIAN (r) CS (t) FLORIAN (c)     Row Name 04/19/18 1057          General Assessment (Manual Muscle Testing)    Comment, General Manual Muscle Testing (MMT) Assessment B LE/UE WFL  -FLORIAN (r) CS (t) FLORIAN (c)     Row Name 04/19/18 1057          Motor Assessment/Intervention    Additional Documentation Balance (Group)  -FLORIAN (r) CS (t) FLORIAN (c)     Row Name 04/19/18 1057          Balance    Balance static sitting balance;dynamic standing balance  -FLORIAN (r) CS (t) FLORIAN (c)     Row Name 04/19/18 1057          Static Sitting Balance    Level of [place] (Unsupported Sitting, Static Balance) supervision  -FLORIAN (r) CS (t) FLORIAN (c)     Sitting Position (Unsupported Sitting, Static Balance) sitting on edge of bed  -FLORIAN (r) CS (t) FLORIAN (c)     Row Name 04/19/18 1057          Dynamic Standing Balance    Level of [place], Reaches Outside Midline (Standing, Dynamic Balance) contact guard assist  -FLORIAN (r) CS (t) FLORIAN (c)     Time Able to Maintain Position, Reaches Outside Midline (Standing, Dynamic Balance) 2 to 3 minutes  -FLORIAN (r) CS (t) FLORIAN (c)     Row Name 04/19/18 1057          Vision Assessment/Intervention    Visual Impairment/Limitations corrective lenses full time  -FLORIAN (r) CS (t) FLORIAN (c)     Row Name 04/19/18 1057          Pain Assessment    Additional Documentation Pain Scale: Numbers Pre/Post-Treatment (Group)  -FLORIAN (r) CS (t) FLORIAN (c)     Row Name 04/19/18 1057          Pain Scale: Numbers Pre/Post-Treatment    Pain Scale: Numbers, Pretreatment 0/10 - no pain  -FLORIAN (r) CS (t) FLORIAN (c)     Pain  Scale: Numbers, Post-Treatment 0/10 - no pain  -FLORIAN (r) CS (t) FLORIAN (c)     Pain Intervention(s) Repositioned;Ambulation/increased activity  -FLORIAN (r) CS (t) FLORIAN (c)     Row Name 04/19/18 1057          Health Promotion    Additional Documentation Coping (Group);Plan of Care Review (Group)  -FLORIAN (r) CS (t) FLORIAN (c)     Row Name 04/19/18 1057          Coping    Observed Emotional State accepting;cooperative;calm  -FLORIAN (r) CS (t) FLORIAN (c)     Row Name 04/19/18 1057          Plan of Care Review    Plan of Care Reviewed With patient;family  -FLORIAN (r) CS (t) FLORIAN (c)     Row Name 04/19/18 1057          Physical Therapy Clinical Impression    Date of Referral to PT 04/15/18  -FLORIAN (r) CS (t) FLORIAN (c)     PT Diagnosis (PT Clinical Impression) impaired mobility and activity tolerance  -FLORIAN (r) CS (t) FLORIAN (c)     Patient/Family Goals Statement (PT Clinical Impression) Return to PLOF   -FLORIAN (r) CS (t) FLORIAN (c)     Criteria for Skilled Interventions Met (PT Clinical Impression) yes;treatment indicated  -FLORIAN (r) CS (t) FLORIAN (c)     Pathology/Pathophysiology Noted (Describe Specifically for Each System) musculoskeletal  -FLORIAN (r) CS (t) FLORIAN (c)     Impairments Found (describe specific impairments) aerobic capacity/endurance;gait, locomotion, and balance  -FLORIAN (r) CS (t) FLORIAN (c)     Functional Limitations in Following Categories (Describe Specific Limitations) self-care;home management  -FLORIAN (r) CS (t) FLORIAN (c)     Rehab Potential (PT Clinical Summary) good, to achieve stated therapy goals  -FLORIAN (r) CS (t) FLORIAN (c)     Predicted Duration of Therapy (PT) until d/c  -FLORIAN (r) CS (t) FLORIAN (c)     Care Plan Review (PT) evaluation/treatment results reviewed;care plan/treatment goals reviewed;patient/other agree to care plan  -FLORIAN (r) CS (t) FLORIAN (c)     Care Plan Review, Other Participant (PT Clinical Impression) family  -FLORIAN (r) CS (t) FLORIAN (c)     Row Name 04/19/18 1057          Physical Therapy Goals    Transfer Goal Selection (PT) transfer, PT goal 1  -FLORIAN (r) CS (t) FLORIAN (c)      Gait Training Goal Selection (PT) gait training, PT goal 1  -FLORIAN (r) CS (t) FLORIAN (c)     Stairs Goal Selection (PT) stairs, PT goal 1  -FLOIRAN (r) CS (t) FLORIAN (c)     Additional Documentation Stairs Goal Selection (PT) (Row)  -FLORIAN (r) CS (t) FLORIAN (c)     Row Name 04/19/18 1057          Transfer Goal 1 (PT)    Activity/Assistive Device (Transfer Goal 1, PT) sit-to-stand/stand-to-sit;bed-to-chair/chair-to-bed;walker, rolling  -FLORIAN (r) CS (t) FLORIAN (c)     [place] Level/Cues Needed (Transfer Goal 1, PT) conditional independence  -FLORIAN (r) CS (t) FLORIAN (c)     Time Frame (Transfer Goal 1, PT) long term goal (LTG);10 days  -FLORIAN (r) CS (t) FLORIAN (c)     Barriers (Transfers Goal 1, PT) none  -FLORIAN (r) CS (t) FLORIAN (c)     Progress/Outcome (Transfer Goal 1, PT) goal ongoing  -FLORIAN (r) CS (t) FLORIAN (c)     Row Name 04/19/18 1057          Gait Training Goal 1 (PT)    Activity/Assistive Device (Gait Training Goal 1, PT) gait (walking locomotion);assistive device use;decrease fall risk;improve balance and speed;increase endurance/gait distance  -FLORIAN (r) CS (t) FLORIAN (c)     [place] Level (Gait Training Goal 1, PT) conditional independence  -FLORIAN (r) CS (t) FLORIAN (c)     Distance (Gait Goal 1, PT) 250ft  -FLORIAN (r) CS (t) FLORIAN (c)     Time Frame (Gait Training Goal 1, PT) long term goal (LTG);10 days  -FLORIAN (r) CS (t) FLORIAN (c)     Barriers (Gait Training Goal 1, PT) none  -FLORIAN (r) CS (t) FLORIAN (c)     Progress/Outcome (Gait Training Goal 1, PT) goal ongoing  -FLORIAN (r) CS (t) FLORIAN (c)     Row Name 04/19/18 1057          Stairs Goal 1 (PT)    Activity/Assistive Device (Stairs Goal 1, PT) ascending stairs;descending stairs  -FLORIAN (r) CS (t) FLORIAN (c)     [place] Level/Cues Needed (Stairs Goal 1, PT) contact guard assist  -FLORIAN (r) CS (t) FLORIAN (c)     Number of Stairs (Stairs Goal 1, PT) 4  -FLORIAN (r) CS (t) FLORIAN (c)     Time Frame (Stairs Goal 1, PT) long term goal (LTG);4 days  -FLORIAN (r) CS (t) FLORIAN (c)     Barriers (Stairs Goal 1, PT) none  -FLORIAN (r) CS (t) FLORIAN (c)     Progress/Outcome (Stairs  Goal 1, PT) goal ongoing  -FLORIAN (r) CS (t) FLORIAN (c)     Row Name 04/19/18 1057          Positioning and Restraints    Pre-Treatment Position in bed  -FLORIAN (r) CS (t) FLORIAN (c)     Post Treatment Position bed  -FLORIAN (r) CS (t) FLORIAN (c)     In Bed fowlers;call light within reach;encouraged to call for assist;with family/caregiver  -FLORIAN (r) CS (t) FLORIAN (c)     Row Name 04/19/18 1057          Living Environment    Home Accessibility stairs to enter home;tub/shower is not walk in  -FLORIAN (r) CS (t) FLORIAN (c)       User Key  (r) = Recorded By, (t) = Taken By, (c) = Cosigned By    Initials Name Provider Type    FLORIAN Jessica, PT DPT Physical Therapist    JASON King, PT Student PT Student          Physical Therapy Education     Title: PT OT SLP Therapies (Active)     Topic: Physical Therapy (Active)     Point: Mobility training (Done)    Learning Progress Summary     Learner Status Readiness Method Response Comment Documented by    Patient Done Acceptance E VU pt and family educated on importance of ambulation multiple times throughout day  04/19/18 1258    Family Done Acceptance E VU pt and family educated on importance of ambulation multiple times throughout day  04/19/18 1258                      User Key     Initials Effective Dates Name Provider Type Discipline     01/08/18 -  Perry King, PT Student PT Student PT                PT Recommendation and Plan  Anticipated Discharge Disposition (PT): home with home health care  Planned Therapy Interventions (PT Eval): balance training, gait training, home exercise program, patient/family education, stair training, strengthening, transfer training  Therapy Frequency (PT Clinical Impression): daily  Outcome Summary/Treatment Plan (PT)  Anticipated Discharge Disposition (PT): home with home health care  Plan of Care Reviewed With: patient, family  Outcome Summary: PT eval complete. pt was A&Ox4 and agreeable to therapy this AM. pt had no complaints of pain, SOB, n/v at  this time. pt B LE/UE MMT were WFL,  B LE LE/UE AROM were WFL with shoulder elevation being ~25% impaired. pt was supervision for bed mobility, CGA for ambulation 120ft with RW, and CGA for transfers. pt reported some fatigue upon returning to room after ambulation. pt would benefit from skilled PT at this time to address impaired functional mobility and activity tolerance. PT recommends home with home health on d/c          Outcome Measures     Row Name 04/19/18 1057             How much help from another person do you currently need...    Turning from your back to your side while in flat bed without using bedrails? 4  -FLORIAN (r) CS (t) FLORIAN (c)      Moving from lying on back to sitting on the side of a flat bed without bedrails? 4  -FLORIAN (r) CS (t) FLORIAN (c)      Moving to and from a bed to a chair (including a wheelchair)? 3  -FLORIAN (r) CS (t) FLORIAN (c)      Standing up from a chair using your arms (e.g., wheelchair, bedside chair)? 3  -FLORIAN (r) CS (t) FLORIAN (c)      Climbing 3-5 steps with a railing? 3  -FLORIAN (r) CS (t) FLORIAN (c)      To walk in hospital room? 3  -FLORIAN (r) CS (t) FLORIAN (c)      AM-PAC 6 Clicks Score 20  -FLORIAN (r) CS (t)         Functional Assessment    Outcome Measure Options AM-PAC 6 Clicks Basic Mobility (PT)  -FLORIAN (r) CS (t) FLORIAN (c)        User Key  (r) = Recorded By, (t) = Taken By, (c) = Cosigned By    Initials Name Provider Type    FLORIAN Jessica, PT DPT Physical Therapist    JASON King, PT Student PT Student           Time Calculation:         PT Charges     Row Name 04/19/18 1302             Time Calculation    Start Time 1057  -FLORIAN (r) CS (t) FLORIAN (c)      Stop Time 1150  -FLORIAN (r) CS (t) FLORIAN (c)      Time Calculation (min) 53 min  -FLORIAN (r) CS (t)      PT Received On 04/19/18  -FLORIAN (r) CS (t) FLORIAN (c)      PT Goal Re-Cert Due Date 04/29/18  -FLORIAN (r) CS (t) FLORIAN (c)        User Key  (r) = Recorded By, (t) = Taken By, (c) = Cosigned By    Initials Name Provider Type    FLORIAN Jessica, PT DPT Physical Therapist     CS Perry King, PT Student PT Student          Therapy Charges for Today     Code Description Service Date Service Provider Modifiers Qty    [id number] HC PT EVAL LOW COMPLEXITY 4 4/19/2018 Perry King, PT Student GP 1          PT G-Codes  Outcome Measure Options: AM-PAC 6 Clicks Basic Mobility (PT)  Score: 20  Functional Limitation: Mobility: Walking and moving around  Mobility: Walking and Moving Around Current Status (): At least 20 percent but less than 40 percent impaired, limited or restricted  Mobility: Walking and Moving Around Goal Status (): At least 1 percent but less than 20 percent impaired, limited or restricted      Perry King, PT Student  4/19/2018

## 2018-05-01 NOTE — PROGRESS NOTES
Transitional Care Follow Up Visit  Subjective     Kortney Henson is a 89 y.o. female who presents for a transitional care management visit.  With daughter/care giver.     Within 48 business hours after discharge our office contacted her via telephone to coordinate her care and needs.      I reviewed and discussed the details of that call along with the discharge summary, hospital problems, inpatient lab results, inpatient diagnostic studies, and consultation reports with Kortney.     Current outpatient and discharge medications have been reconciled for the patient.    Admit Date:    4/15/2018   Discharge Date: 4.19.18  Date of service: 4.19.18  Reasons for admit/problems address while here:   Shortness of breath-resolved; using oxygen night  Respiratory failure-hypoxic-acute-oxygen supported (hx nocturnal hypoxemia)  Pneumonia-bilateral upper/community exposed/CT chest  Severe sepsis (RR 36, , lactic acid 2.4, WBC 12.58, neuto % 89%)-BC           neg/parameters improved  CHF-acute/chronic diastolic (proBNP 4250)  Pulmonary HTN on echo  Hyperglycemia 344-at admit;-coming down since steroids over  Hypoglycemia-  Labile blood sugar  A fib-new-tolerated without syncope, near; can feel ectopy/palpitations.   Cough-improved-resolved  Anticoagulation-DM2, now new a fib/ASA 81 (no coumadin with age/etc)-temporary DVT            prevention lovonex  Gait decline-acute/chronic  Hypopotassemia-diuretic affected      No flowsheet data found.  Risk for Readmission (LACE) Score: 9 (4/19/2018  5:00 AM)    History of Present Illness  (from dc summary)  HISTORY OF PRESENT ILLNESS: She has several chronic illnesses that include nocturnal hypoxemia, HTN, DM2-generally controlled, cardiac arrhythmia with few PVC on holter 2016. She says she has some degree of chronic cough; she has never smoked.  Around 4.11.18 she developed increasing cough, tachypnea, and mild SOB.  She was seen [facility] ED with feeling she had copd excerbation; she had D  "dimer 1.67H, was in new A fib/rate controlled.  She had CXR that showed cardiomegaly, vascular congestion.  She was agreeable to home care; and was sent home on levaquin and steroids.  She returned 4.15.18 worse and this time was felt to have pneumonia; CT angio showed no pulmonary embolus but patchy nodular opacities bilaterally upper lobes felt to be pneumonia.  She was \"hypoxic\" and had RR 36, , lactic acid 2.4, WBC 12.58 neutrophils 89%, proBNP 4250, D dimer 0.91  (last A1c 6.90).  Admission was advised.  We continued her duonebs, Levaquin (has many allergies to antibiotics), and steroids with insulin coverage.  She was given a dose of lasix IV in ED/noting she has home po lasix.  She feels alittle better on AM rounds.  I now note EKG shows new A fib (never mentioned on ED/phone report).     Course During Hospital Stay:  (from dc summary)  HOSPITAL COARSE: She cannot be treated aggressively with fluids due to concern that she had degrees of CHF.  Fortunately her blood pressure did not go significantly low.  She was treated with IV antibiotics, DuoNeb updraft therapy, and briefly steroids.  Her blood sugar did elevate and we added sliding scale insulin.  Her blood sugar then began have daily morning lows; he had a rather rapidly reduce the insulin given.  Chest x-rays were followed and showed improvement.  Her cough improved.  By the time of discharge she was tested for oxygen needs and had none except anticipating that she continued to be on her nocturnal oxygen 1 sent home.  Cardiac testing showed a significantly enlarged left atrium preserved left ventricular function and she remained in her new atrial fib.  Cardiology (Dr. Garzon) did not think she was a candidate for cardioversion but simply rate control.  Dr. Garzon did not think she was a candidate for anticoagulation due to her advanced age and the risk for falls.  It was discussed with her daughter that this raises the risks for her having " stroke; something we really cannot do anything about.  She did have therapy while here; her gait here seems currently stable.  She has all the equipment she needs for discharge.     The following portions of the patient's history were reviewed and updated as appropriate: allergies, current medications, past family history, past medical history, past social history, past surgical history and problem list.    Review of Systems   GENERAL:  Inactive/slower with limits, speed, stamina for age, balance, LE strength. Sleep is ok. No fever.  ENDO:  No syncope, near or diaphoretic sweaty spells.  BS improving off steroids.  HEENT: No head injury or headache,  No vision change,  Same hearing loss.  Ears without pain/drainage.  No sore throat.  No significant nasal/sinus congestion/drainage. No epistaxis.  CHEST: No chest wall tenderness or mass. No cough, wheeze; mild SOB; no hemoptysis.  CV: No chest pain, palpitations, ankle edema.  GI: No heartburn, dysphagia.  No abdominal pain, diarrhea, constipation, rectal bleeding, or melena.  :  Voids without dysuria, or incontinence to completion.  ORTHO: No painful/swollen joints but various on /off sore.  No sore neck or back.  No acute neck or back pain without recent injury.   NEURO: No dizziness, weakness of extremities.  No numbness/paresthesias.   PSYCH: Mild memory loss.  Mood good; not that anxious, depressed but/and not suicidal.  Tolerated stress.     Objective   Physical Exam   GENERAL:  Well nourished/developed in no acute distress. Thin; frail  SKIN: Turgor excellent, without wound, rash, lesion  HEENT: Normal cephalic without trauma.  Pupils equal round reactive to light. Extraocular motions full without nystagmus.   External canals nonobstructive nontender without reddness. Tymphatic membranes elza with cristina structures intact.   Oral cavity without growths, exudates, and moist.  Posterior pharynx without mass, obstruction, redness.  No thyromegaly, mass,  tenderness, lymphadenopathy and supple.  CV: Irregular irregular rhythm.  No murmur, gallop,  edema. Posterior pulses intact.  No carotid bruits.   CHEST: No chest wall tenderness or mass; mild kyphosis  LUNGS: Symmetric motion with clear to auscultation.  No dullness to percussion  ABD: Soft, nontender without mass.   ORTHO: Symmetric extremities without swelling/point tenderness.  Full gross range of motion.    NEURO: CN 2-12 grossly intact.  Symmetric facies. 1/4 x bicep equal reflexes.  UE/LE   2/5 strength throughout.  Nonfocal use extremities. Speech clear.     PSYCH: Oriented x 3.  Pleasant calm, well kept.  Purposeful/directed conservation with intact short/long gross memory.     Assessment/Plan   June was seen today for pneumonia.    Diagnoses and all orders for this visit:    Pneumonia of both lower lobes due to infectious organism  -     CBC & Differential-today  -     Comprehensive Metabolic Panel-today  -     XR Chest PA & Lateral; Future-not till mid June  -     Ambulatory Referral to Home Health    Fatigue, unspecified type  -     CBC & Differential  -     Comprehensive Metabolic Panel  -     Ambulatory Referral to Home Health    Low vitamin B12 level      RETURN:   B12 today and move 2 week B12 month today  Me 3-4 months.

## 2018-05-04 NOTE — TELEPHONE ENCOUNTER
Calling in blood sugar readings from yesterday. FBS 68, 2 hrs after lunch 243, but ate peanut butter around 1:30, before supper 160, and bedtime 149.

## 2018-08-14 PROBLEM — Z79.01 ANTICOAGULATED: Status: ACTIVE | Noted: 2018-01-01

## 2018-08-14 NOTE — PROGRESS NOTES
Pt here for office visit, and B 12 injection as ordered. Given in RVG site. Pt tolerated well and no adverse reactions noted and pt left office.

## 2018-08-14 NOTE — PROGRESS NOTES
Subjective   Kortney Henson is a 89 y.o. female presenting with chief complaint of:   Chief Complaint   Patient presents with   • Diabetes   • Hypertension   • Hyperlipidemia       History of Present Illness :  With daughter.   Has multiple chronic problems to consider that might have a bearing on today's issues;  an interval appointment.       Chronic/acute problems to review today:   1. Essential hypertension: chronic/stable considering recent bps here; ? Too low for age.  One recent fall. Uses cane when up; sometimes walker.    2. Low vitamin B12 level: chronic/stable and replacing IM   3. Tremor-essential/inderal: chronic/stable and Rx helps.    4. Gait difficulty: chronic/variable and often cane or walker used.  One recent fall without injury.    5. Osteoporosis, unspecified osteoporosis type, unspecified pathological fracture presence: chronic/variable and upcoming [facility] bone density after forteo there; may consider prolia if offered.    6. Controlled type 2 diabetes mellitus without complication, without long-term current use of insulin (CMS/[facility]): chronic/variable but she considers doing well even though BS often elevated (but A1c run good).  No syncope/near.     7. Depression, unspecified depression type: chronic/stable without significant mood decline; nonsuicidal.    8. Anticoagulated: DM2, hx a fib/ASA 81 (other-fall): chronic/stable for reasons/and tolerated.      Has an/another acute issue today: none.    The following portions of the patient's history were reviewed and updated as appropriate: allergies, current medications, past family history, past medical history, past social history, past surgical history and problem list.  Records acquired and reviewed; TCC migrated.      Current Outpatient Prescriptions:   •  amLODIPine (NORVASC) 5 MG tablet, TAKE ONE TABLET TWICE DAILY GENERIC FOR NORVASC, Disp: 180 tablet, Rfl: 1  •  aspirin 81 MG EC tablet, Take 81 mg by mouth Daily., Disp: , Rfl:   •   atorvastatin (LIPITOR) 10 MG tablet, TAKE ONE TABLET AT BEDTIME GENERIC FOR LIPITOR, Disp: 90 tablet, Rfl: 1  •  Calcium Carb-Cholecalciferol (CALCIUM-VITAMIN D) 600-400 MG-UNIT tablet, Take 1 tablet by mouth 2 (Two) Times a Day., Disp: , Rfl:   •  docusate sodium (COLACE) 100 MG capsule, Take 100 mg by mouth 2 (Two) Times a Day As Needed for constipation., Disp: , Rfl:   •  DULoxetine (CYMBALTA) 30 MG capsule, TAKE 1 CAPSULE DAILY GENERIC FOR CYMBALTA, Disp: 90 capsule, Rfl: 1  •  furosemide (LASIX) 20 MG tablet, Take 1 tablet by mouth Daily., Disp: 30 tablet, Rfl: 5  •  glimepiride (AMARYL) 4 MG tablet, TAKE ONE TABLET EVERY MORNING GENERIC FOR AMARYL, Disp: 90 tablet, Rfl: 1  •  glucose blood (COOL BLOOD GLUCOSE TEST STRIPS) test strip, USE AS DIRECTED DAILY, Disp: 100 each, Rfl: 3  •  JANUVIA 100 MG tablet, TAKE ONE TABLET DAILY, Disp: 90 tablet, Rfl: 1  •  losartan (COZAAR) 100 MG tablet, TAKE ONE TABLET DAILY GENERIC FOR COZAAR, Disp: 90 tablet, Rfl: 1  •  metFORMIN ER (GLUCOPHAGE-XR) 500 MG 24 hr tablet, TAKE  2 TABLETS DAILY WITH EVENING MEAL GENERIC FOR GLUCOPHAGE ER( DOSE IN  REHAB CENTER), Disp: 180 tablet, Rfl: 1  •  Multiple Vitamins-Minerals (WOMENS MULTI VITAMIN & MINERAL) tablet, Take 1 tablet by mouth Daily., Disp: , Rfl:   •  potassium chloride (MICRO-K) 10 MEQ CR capsule, Take 1 capsule by mouth 2 (Two) Times a Day. Taken with lasix., Disp: 60 capsule, Rfl: 5  •  propranolol (INDERAL) 40 MG tablet, TAKE ONE TABLET THREE TIMES DAILY GENERIC FOR INDERAL, Disp: 270 tablet, Rfl: 1  •  raNITIdine (ZANTAC) 150 MG tablet, TAKE ONE TABLET TWICE DAILY GENERIC FOR ZANTAC, Disp: 180 tablet, Rfl: 1    Current Facility-Administered Medications:   •  cyanocobalamin injection 1,000 mcg, 1,000 mcg, Intramuscular, Q30 Days, Luciano Paniagua MD, 1,000 mcg at 08/14/18 1043    No problems with medications.  Refills if needed done    Allergies   Allergen Reactions   • Penicillins Swelling and Rash       Review of  Systems  GENERAL:  Inactive/slower with limits, speed, stamina for age, balance, LE strength. Sleep is ok. No fever.  ENDO:  No syncope, near or diaphoretic sweaty spells.  BS improving off steroids.  HEENT: No head injury or headache,  No vision change,  Same hearing loss.  Ears without pain/drainage.  No sore throat.  No significant nasal/sinus congestion/drainage. No epistaxis.  CHEST: No chest wall tenderness or mass. No cough, wheeze; mild SOB; no hemoptysis.  CV: No chest pain, palpitations, ankle edema.  GI: No heartburn, dysphagia.  No abdominal pain, diarrhea, constipation, rectal bleeding, or melena.  :  Voids without dysuria, or incontinence to completion.  ORTHO: No painful/swollen joints but various on /off sore.  No sore neck or back.  No acute neck or back pain without recent injury.   NEURO: No dizziness, weakness of extremities.  No numbness/paresthesias.   PSYCH: Mild memory loss.  Mood good; not that anxious, depressed but/and not suicidal.  Tolerated stress.   Screening:  Mammogram: none; always declines  Bone density: fracture 2016  Low dose CT chest: NA  GI:   Colonoscopy/[facility]/B/2000?/offered  Prostate: NA  Usual lab order  6m CBC, A1c, BMP, B12, folate  12m CBC, A1c, CMP, LIPID, TSH, Vit D, Microalbumin urine, B12, folate    Results for orders placed or performed in visit on 05/01/18   Comprehensive Metabolic Panel   Result Value Ref Range    Glucose 316 (H) 70 - 100 mg/dL    BUN 24 (H) 5 - 21 mg/dL    Creatinine 0.86 0.50 - 1.40 mg/dL    eGFR Non African Am 62 >60 mL/min/1.73    eGFR African Am 75 >60 mL/min/1.73    BUN/Creatinine Ratio 27.9 (H) 7.0 - 25.0    Sodium 138 135 - 145 mmol/L    Potassium 5.1 3.5 - 5.3 mmol/L    Chloride 97 (L) 98 - 110 mmol/L    Total CO2 30.0 24.0 - 31.0 mmol/L    Calcium 9.5 8.4 - 10.4 mg/dL    Total Protein 5.9 (L) 6.3 - 8.7 g/dL    Albumin 3.30 (L) 3.50 - 5.00 g/dL    Globulin 2.6 gm/dL    A/G Ratio 1.3 1.1 - 2.5 g/dL    Total Bilirubin 0.5 0.1 - 1.0 mg/dL     Alkaline Phosphatase 82 24 - 120 U/L    AST (SGOT) 22 7 - 45 U/L    ALT (SGPT) 20 0 - 54 U/L   CBC & Differential   Result Value Ref Range    WBC 10.97 (H) 4.80 - 10.80 10*3/mm3    RBC 4.04 (L) 4.20 - 5.40 10*6/mm3    Hemoglobin 12.0 12.0 - 16.0 g/dL    Hematocrit 38.2 37.0 - 47.0 %    MCV 94.6 82.0 - 98.0 fL    MCH 29.7 28.0 - 32.0 pg    MCHC 31.4 (L) 33.0 - 36.0 g/dL    RDW 13.8 12.0 - 15.0 %    Platelets 311 130 - 400 10*3/mm3    Neutrophil Rel % 83.7 (H) 39.0 - 78.0 %    Lymphocyte Rel % 7.0 (L) 15.0 - 45.0 %    Monocyte Rel % 6.7 4.0 - 12.0 %    Eosinophil Rel % 1.8 0.0 - 4.0 %    Basophil Rel % 0.3 0.0 - 2.0 %    Neutrophils Absolute 9.17 (H) 1.87 - 8.40 10*3/mm3    Lymphocytes Absolute 0.77 0.72 - 4.86 10*3/mm3    Monocytes Absolute 0.74 0.19 - 1.30 10*3/mm3    Eosinophils Absolute 0.20 0.00 - 0.70 10*3/mm3    Basophils Absolute 0.03 0.00 - 0.20 10*3/mm3    Immature Granulocyte Rel % 0.5 0.0 - 5.0 %    Immature Grans Absolute 0.06 (H) 0.00 - 0.03 10*3/mm3    nRBC 0.0 0.0 - 0.0 /100 WBC       No results found for: PSA     Lab Results:  CBC:  Lab Results - Last 18 Months  Lab Units 05/01/18  1217 04/18/18  0413 04/17/18  0448 04/16/18  0443 04/15/18  1211 04/13/18  2049 03/15/18  1041   WBC 10*3/mm3 10.97* 6.79 6.17 8.55 12.58* 7.90 8.47   HEMOGLOBIN g/dL 12.0 11.9* 11.6* 12.2 11.3* 11.8* 12.2   HEMATOCRIT % 38.2 36.5* 35.9* 37.4 34.9* 36.6* 39.2   PLATELETS 10*3/mm3 311 211 210 233 239 204 267   IRON mcg/dL  --   --   --   --   --   --  77      BMP/CMP:  Lab Results - Last 18 Months  Lab Units 05/01/18  1217 04/18/18  0413 04/17/18  0448 04/16/18  0443 04/15/18  1211 04/13/18  2049 03/15/18  1041 08/18/17  1242 02/14/17  0842   SODIUM mmol/L 138 138 141 146* 139 141 143 142 139   POTASSIUM mmol/L 5.1 5.0 4.0 3.3* 4.5 4.2 4.7 4.4 4.3   CHLORIDE mmol/L 97* 100 100 100 98 100 100 103 99   CO2 mmol/L  --  30.0 35.0* 34.0* 32.0* 30.0  --   --   --    TOTAL CO2, ARTERIAL mmol/L 30.0  --   --   --   --   --  31.0  "26.0 30.0   GLUCOSE mg/dL 316*  --   --   --   --   --  191* 230* 164*   BUN mg/dL 24* 20 22* 21 32* 20 22* 21 14   CREATININE mg/dL 0.86 0.71 0.74 0.63 0.69 0.68 0.87 0.71 0.63   EGFR IF NONAFRICN AM mL/min/1.73 62 77 74 89 80 81 61 77 89   EGFR IF AFRICN AM mL/min/1.73 75  --   --   --   --   --  74 94 108   CALCIUM mg/dL 9.5 9.5 9.3 9.8 9.6 10.0 10.7* 10.0 9.6     HEPATIC:  Lab Results - Last 18 Months  Lab Units 05/01/18  1217 04/15/18  1211 04/13/18 2049 03/15/18  1041 08/18/17  1242 02/14/17  0842   ALT (SGPT) U/L 20 35 25 25 40 36   AST (SGOT) U/L 22 44 28 21 26 22   ALK PHOS U/L 82 67 76 69 67 62     THYROID:  Lab Results - Last 18 Months  Lab Units 04/13/18  2049 03/15/18  1041 02/14/17  0842   TSH mIU/mL 2.360 1.550 1.380   FREE T4 ng/dL  --  1.59  --      A1C:  Lab Results - Last 18 Months  Lab Units 03/15/18  1041 08/18/17  1242 02/14/17  0842   HEMOGLOBIN A1C % 6.90 7.20 6.90     PSA:No results for input(s): PSA in the last 56887 hours.    Objective   /66 (BP Location: Left arm, Patient Position: Sitting, Cuff Size: Adult)   Pulse 74   Temp 98.1 °F (36.7 °C) (Oral)   Resp 16   Ht 167 cm (65.75\")   Wt 59.9 kg (132 lb)   SpO2 98%   BMI 21.47 kg/m²   Body mass index is 21.47 kg/m².    Physical Exam  GENERAL:  Well nourished/developed in no acute distress. Thin; frail  SKIN: Turgor excellent, without wound, rash, lesion  HEENT: Normal cephalic without trauma.  Pupils equal round reactive to light. Extraocular motions full without nystagmus.   External canals nonobstructive nontender without reddness. Tymphatic membranes elza with cristina structures intact.   Oral cavity without growths, exudates, and moist.  Posterior pharynx without mass, obstruction, redness.  No thyromegaly, mass, tenderness, lymphadenopathy and supple.  CV: Regular rhythm.  No murmur, gallop,  edema. Posterior pulses intact.  No carotid bruits.   CHEST: No chest wall tenderness or mass; mild kyphosis  LUNGS: Symmetric " motion with clear to auscultation.  No dullness to percussion  ABD: Soft, nontender without mass.   ORTHO: Symmetric extremities without swelling/point tenderness.  Full gross range of motion.    NEURO: CN 2-12 grossly intact.  Symmetric facies. 1/4 x bicep equal reflexes.  UE/LE   2/5 strength throughout.  Nonfocal use extremities. Speech clear.     PSYCH: Oriented x 3.  Pleasant calm, well kept.  Purposeful/directed conservation with intact short/long gross memory.     Assessment/Plan     1. Essential hypertension    2. Low vitamin B12 level    3. Tremor-essential/inderal    4. Gait difficulty    5. Osteoporosis, unspecified osteoporosis type, unspecified pathological fracture presence    6. Controlled type 2 diabetes mellitus without complication, without long-term current use of insulin (CMS/[facility])    7. Depression, unspecified depression type    8. Anticoagulated: DM2, hx a fib/ASA 81 (other-fall)        Rx: reviewed/changes:  Take Norvasc 5 bid and decrease to 5 AM, 2.5 pm  (monitor bp preferred )  Not unreasonable to consider prolia if needed    LAB/Testing/Referrals: reviewed/orders:   Today: same 6/12  No orders of the defined types were placed in this encounter.      Discussions:     Body mass index is 21.47 kg/m².   Patient's Body mass index is 21.47 kg/m². BMI is within normal parameters. No follow-up required.  Non-smoker    Patient Instructions   Reduce your Norvas 5 twice a day to 5 mg in the morning and only 2.5 at night    Goal for your blood pressure is 140 range top and 90-80 range bottom and you feeling better. Use us/or home monitoring to prove this.         Follow up: Return for lab during/or just before next apt;, Dr Paniagua-3 to 4 months.  Future Appointments  Date Time Provider Department Center   9/14/2018 11:00 AM NURSE/MA [facility] [place] [facility] None   10/12/2018 11:00 AM NURSE/[facility] [place] [facility] None   11/12/2018 8:50 AM LAB [facility] [place] [facility] None   11/12/2018 9:00 AM  NURSE/MA FRANDY FULLER [facility] None   12/6/2018 10:30 AM Luciano Paniagua MD [facility] None

## 2018-08-14 NOTE — PATIENT INSTRUCTIONS
Reduce your Norvas 5 twice a day to 5 mg in the morning and only 2.5 at night    Goal for your blood pressure is 140 range top and 90-80 range bottom and you feeling better. Use us/or home monitoring to prove this.

## 2018-09-13 NOTE — TELEPHONE ENCOUNTER
Vy Allan DTR called and states pt fell around Labor Day and is c/o hip and thigh pain states she can not walk well she is even using her walker again and would like to have a Xray order at [facility] Hip and thigh Vy  Same hip pt fx before

## 2018-09-14 NOTE — PROGRESS NOTES
Pt and Daughter informed no fracture. Sometimes have to do CTs,MRIs to find hairline fractures. She doesn't want to do anything else right now. Will observe and see how she does.

## 2018-12-06 NOTE — PATIENT INSTRUCTIONS
Over the counter 1000 mcg B12 by mouth each day    Offered PT referral- here/or daughter area.      Offered to help with Prolia

## 2018-12-06 NOTE — PROGRESS NOTES
"Subjective   Kortney Henson is a 89 y.o. female presenting with chief complaint of:   Chief Complaint   Patient presents with   • Diabetes     3 month follow-up.   • Hypertension   • Hyperlipidemia       History of Present Illness :  With daughter (who lives near [place]).   Has multiple chronic problems to consider that might have a bearing on today's issues;  an interval appointment.       Chronic/acute problems reviewed today:   1. Gait difficulty: chronic/stable without fall.  Uses cane most of time.     2. Hypertension, unspecified type: Chronic/stable. Stable here/if home blood pressures.  No significant chest pain, SOB, LE edema, orthopnea, near syncope, dizziness/light headness.      3. Hyperlipidemia, unspecified hyperlipidemia type: Chronic/stable.  Tolerated use of statin with labs showing improved lipid values and tolerant liver labs. No muscle aches unexpected.      4. Cardiac arrhythmia, unspecified cardiac arrhythmia type: chronic/stable occ palpitation without syncope/near.    5. Nocturnal hypoxemia-nocturnal oxygen: chronic/stable use of oxygen HS without sob.    6. Controlled type 2 diabetes mellitus without complication, without long-term current use of insulin (CMS/[facility]): Chronic/stable. No problem/pattern hypoglycemia/hyperglycemia manifest by poly- dypsia, phagia, uria, or sweats, diaphoretic episodes, syncope/near.     7. Osteoporosis, unspecified osteoporosis type, unspecified pathological fracture presence: chronic/stable history fracture and f/u 18 m forteo through [facility]; was \"retested\" (no results of in system); and advised prolia.  Has not followed through on prolia.    8. Anemia, unspecified type: chronic/variable benign to date without melena, rectal bleeding.    9. Low vitamin B12 level-IM: chronic/stable use of IM B12   10. Tremor-essential/inderal: chronic/stable head, UE resting tremor.       Has an/another acute issue today: none.    The following portions of the patient's history " were reviewed and updated as appropriate: allergies, current medications, past family history, past medical history, past social history, past surgical history and problem list.      Current Outpatient Medications:   •  amLODIPine (NORVASC) 5 MG tablet, TAKE ONE TABLET TWICE DAILY GENERIC FOR NORVASC, Disp: 180 tablet, Rfl: 1  •  aspirin 81 MG EC tablet, Take 81 mg by mouth Daily., Disp: , Rfl:   •  atorvastatin (LIPITOR) 10 MG tablet, TAKE ONE TABLET AT BEDTIME GENERIC FOR LIPITOR, Disp: 90 tablet, Rfl: 1  •  Calcium Carb-Cholecalciferol (CALCIUM-VITAMIN D) 600-400 MG-UNIT tablet, Take 1 tablet by mouth 2 (Two) Times a Day., Disp: , Rfl:   •  docusate sodium (COLACE) 100 MG capsule, Take 100 mg by mouth 2 (Two) Times a Day As Needed for constipation., Disp: , Rfl:   •  DULoxetine (CYMBALTA) 30 MG capsule, TAKE 1 CAPSULE DAILY GENERIC FOR CYMBALTA, Disp: 90 capsule, Rfl: 1  •  furosemide (LASIX) 20 MG tablet, TAKE 1 TABLET DAILY GENERIC FOR LASIX, Disp: 90 tablet, Rfl: 3  •  glimepiride (AMARYL) 4 MG tablet, TAKE ONE TABLET EVERY MORNING GENERIC FOR AMARYL, Disp: 90 tablet, Rfl: 1  •  glucose blood (COOL BLOOD GLUCOSE TEST STRIPS) test strip, USE AS DIRECTED DAILY, Disp: 100 each, Rfl: 3  •  JANUVIA 100 MG tablet, TAKE ONE TABLET DAILY, Disp: 90 tablet, Rfl: 1  •  losartan (COZAAR) 100 MG tablet, TAKE ONE TABLET DAILY GENERIC FOR COZAAR, Disp: 90 tablet, Rfl: 1  •  metFORMIN ER (GLUCOPHAGE-XR) 500 MG 24 hr tablet, TAKE  2 TABLETS DAILY WITH EVENING MEAL GENERIC FOR GLUCOPHAGE ER( DOSE IN  REHAB CENTER), Disp: 180 tablet, Rfl: 1  •  Multiple Vitamins-Minerals (WOMENS MULTI VITAMIN & MINERAL) tablet, Take 1 tablet by mouth Daily., Disp: , Rfl:   •  potassium chloride (MICRO-K) 10 MEQ CR capsule, TAKE 1 CAPSULE TWICE DAILY (WITH LASIX), Disp: 60 capsule, Rfl: 3  •  propranolol (INDERAL) 40 MG tablet, TAKE ONE TABLET THREE TIMES DAILY GENERIC FOR INDERAL, Disp: 270 tablet, Rfl: 1  •  raNITIdine (ZANTAC) 150 MG tablet,  TAKE ONE TABLET TWICE DAILY GENERIC FOR ZANTAC, Disp: 180 tablet, Rfl: 1    Current Facility-Administered Medications:   •  cyanocobalamin injection 1,000 mcg, 1,000 mcg, Intramuscular, Q30 Days, Luciano Paniagua MD, 1,000 mcg at 12/06/18 1530    No problems with medications.  Refills if needed done    Allergies   Allergen Reactions   • Penicillins Swelling and Rash       Review of Systems  GENERAL:  Inactive/slower with limits, speed, stamina for age, balance, LE strength. Sleep is ok. No fever.  ENDO:  No syncope, near or diaphoretic sweaty spells.  BS without significant low/high.  HEENT: No head injury or headache,  No vision change,  Same hearing loss.  Ears without pain/drainage.  No sore throat.  No significant nasal/sinus congestion/drainage. No epistaxis.  CHEST: No chest wall tenderness or mass. No cough, wheeze; mild SOB; no hemoptysis.  CV: No chest pain, palpitations, ankle edema.  GI: No heartburn, dysphagia.  No abdominal pain, diarrhea, constipation, rectal bleeding, or melena.  :  Voids without dysuria, or incontinence to completion.  ORTHO: No painful/swollen joints but various on /off sore.  No sore neck or back.  No acute neck or back pain without recent injury.   NEURO: No dizziness, weakness of extremities.  No numbness/paresthesias.   PSYCH: Mild memory loss.  Mood good; not that anxious, depressed but/and not suicidal.  Tolerated stress.   Screening:  Mammogram: none; always declines  Bone density: fracture 2016-used forteo-[facility] repeated (? Result); pt declined prolia.   Low dose CT chest: NA  GI:   Colonoscopy/[facility]/B/2000?/offered  Prostate: NA  Usual lab order  6m CBC, A1c, BMP, B12, folate  12m CBC, A1c, CMP, LIPID, TSH, Vit D, Microalbumin urine, B12, folate       Results for orders placed or performed in visit on 11/08/18   MicroAlbumin, Urine, Random - Urine, Clean Catch   Result Value Ref Range    Microalbumin, Urine 28.8 Not Estab. ug/mL   Comprehensive Metabolic Panel   Result  Value Ref Range    Glucose 129 (H) 70 - 100 mg/dL    BUN 19 5 - 21 mg/dL    Creatinine 0.66 0.50 - 1.40 mg/dL    eGFR Non African Am 84 >60 mL/min/1.73    eGFR African Am 102 >60 mL/min/1.73    BUN/Creatinine Ratio 28.8 (H) 7.0 - 25.0    Sodium 142 135 - 145 mmol/L    Potassium 4.3 3.5 - 5.3 mmol/L    Chloride 101 98 - 110 mmol/L    Total CO2 30.0 24.0 - 31.0 mmol/L    Calcium 10.2 8.4 - 10.4 mg/dL    Total Protein 6.9 6.3 - 8.7 g/dL    Albumin 4.10 3.50 - 5.00 g/dL    Globulin 2.8 gm/dL    A/G Ratio 1.5 1.1 - 2.5 g/dL    Total Bilirubin 0.5 0.1 - 1.0 mg/dL    Alkaline Phosphatase 88 24 - 120 U/L    AST (SGOT) 21 7 - 45 U/L    ALT (SGPT) 21 0 - 54 U/L   Hemoglobin A1c   Result Value Ref Range    Hemoglobin A1C 6.60 %   TSH   Result Value Ref Range    TSH 1.430 0.470 - 4.680 mIU/mL   Lipid Panel   Result Value Ref Range    Total Cholesterol 127 (L) 130 - 200 mg/dL    Triglycerides 95 0 - 149 mg/dL    HDL Cholesterol 57 >=50 mg/dL    VLDL Cholesterol 19 mg/dL    LDL Cholesterol  51 0 - 99 mg/dL   Vitamin D 25 Hydroxy   Result Value Ref Range    25 Hydroxy, Vitamin D 48.2 30.0 - 100.0 ng/ml   Vitamin B12   Result Value Ref Range    Vitamin B-12 422 239 - 931 pg/mL   Folate   Result Value Ref Range    Folate >20.00 >2.75 ng/mL   CBC & Differential   Result Value Ref Range    WBC 6.65 4.80 - 10.80 10*3/mm3    RBC 4.14 (L) 4.20 - 5.40 10*6/mm3    Hemoglobin 12.0 12.0 - 16.0 g/dL    Hematocrit 39.5 37.0 - 47.0 %    MCV 95.4 82.0 - 98.0 fL    MCH 29.0 28.0 - 32.0 pg    MCHC 30.4 (L) 33.0 - 36.0 g/dL    RDW 15.6 (H) 12.0 - 15.0 %    Platelets 251 130 - 400 10*3/mm3    Neutrophil Rel % 70.9 39.0 - 78.0 %    Lymphocyte Rel % 14.0 (L) 15.0 - 45.0 %    Monocyte Rel % 9.6 4.0 - 12.0 %    Eosinophil Rel % 4.7 (H) 0.0 - 4.0 %    Basophil Rel % 0.5 0.0 - 2.0 %    Neutrophils Absolute 4.72 1.87 - 8.40 10*3/mm3    Lymphocytes Absolute 0.93 0.72 - 4.86 10*3/mm3    Monocytes Absolute 0.64 0.19 - 1.30 10*3/mm3    Eosinophils Absolute  0.31 0.00 - 0.70 10*3/mm3    Basophils Absolute 0.03 0.00 - 0.20 10*3/mm3    Immature Granulocyte Rel % 0.3 0.0 - 5.0 %    Immature Grans Absolute 0.02 0.00 - 0.03 10*3/mm3    nRBC 0.0 0.0 - 0.0 /100 WBC       No results found for: PSA     Lab Results:  CBC:  Lab Results - Last 18 Months   Lab Units  11/08/18   0756  05/01/18   1217  04/18/18   0413  04/17/18   0448  04/16/18   0443  04/15/18   1211  04/13/18   2049  03/15/18   1041   WBC 10*3/mm3   --   10.97*  6.79  6.17  8.55  12.58*  7.90  8.47   HEMOGLOBIN g/dL  12.0  12.0  11.9*  11.6*  12.2  11.3*  11.8*  12.2   HEMATOCRIT %  39.5  38.2  36.5*  35.9*  37.4  34.9*  36.6*  39.2   PLATELETS 10*3/mm3  251  311  211  210  233  239  204  267   IRON mcg/dL   --    --    --    --    --    --    --   77      BMP/CMP:  Lab Results - Last 18 Months   Lab Units  11/08/18   0756  05/01/18   1217  04/18/18   0413  04/17/18   0448  04/16/18   0443  04/15/18   1211  04/13/18   2049  03/15/18   1041  08/18/17   1242   SODIUM mmol/L  142  138  138  141  146*  139  141  143  142   POTASSIUM mmol/L  4.3  5.1  5.0  4.0  3.3*  4.5  4.2  4.7  4.4   CHLORIDE mmol/L  101  97*  100  100  100  98  100  100  103   CO2 mmol/L   --    --   30.0  35.0*  34.0*  32.0*  30.0   --    --    TOTAL CO2 mmol/L  30.0  30.0   --    --    --    --    --   31.0  26.0   GLUCOSE mg/dL  129*  316*   --    --    --    --    --   191*  230*   BUN mg/dL  19  24*  20  22*  21  32*  20  22*  21   CREATININE mg/dL  0.66  0.86  0.71  0.74  0.63  0.69  0.68  0.87  0.71   EGFR IF NONAFRICN AM mL/min/1.73  84  62  77  74  89  80  81  61  77   EGFR IF AFRICN AM mL/min/1.73  102  75   --    --    --    --    --   74  94   CALCIUM mg/dL  10.2  9.5  9.5  9.3  9.8  9.6  10.0  10.7*  10.0     HEPATIC:  Lab Results - Last 18 Months   Lab Units  11/08/18   0756  05/01/18   1217  04/15/18   1211  04/13/18   2049  03/15/18   1041  08/18/17   1242   ALT (SGPT) U/L  21  20  35  25  25  40   AST (SGOT) U/L  21  22  44  28  21   "26   ALK PHOS U/L  88  82  67  76  69  67     THYROID:  Lab Results - Last 18 Months   Lab Units  11/08/18   0756  04/13/18   2049  03/15/18   1041   TSH mIU/mL  1.430  2.360  1.550     A1C:  Lab Results - Last 18 Months   Lab Units  11/08/18   0756  03/15/18   1041  08/18/17   1242   HEMOGLOBIN A1C %  6.60  6.90  7.20     PSA:No results for input(s): PSA in the last 53368 hours.    Objective   /70 (BP Location: Left arm, Patient Position: Sitting, Cuff Size: Adult)   Pulse 61   Temp 97.4 °F (36.3 °C) (Oral)   Resp 18   Ht 167 cm (65.75\")   Wt 59.9 kg (132 lb)   SpO2 95%   BMI 21.47 kg/m²   Body mass index is 21.47 kg/m².    Physical Exam  GENERAL:  Well nourished/developed in no acute distress. Thin; frail  SKIN: Turgor excellent, without wound, rash, lesion  HEENT: Normal cephalic without trauma.  Pupils equal round reactive to light. Extraocular motions full without nystagmus.   External canals nonobstructive nontender without reddness. Tymphatic membranes elza with cristina structures intact.   Oral cavity without growths, exudates, and moist.  Posterior pharynx without mass, obstruction, redness.  No thyromegaly, mass, tenderness, lymphadenopathy and supple.  CV: Regular rhythm.  No murmur, gallop,  edema. Posterior pulses intact.  No carotid bruits.   CHEST: No chest wall tenderness or mass; mild kyphosis  LUNGS: Symmetric motion with clear to auscultation.  No dullness to percussion  ABD: Soft, nontender without mass.   ORTHO: Symmetric extremities without swelling/point tenderness.  Full gross range of motion.    NEURO: CN 2-12 grossly intact.  Symmetric facies. 1/4 x bicep equal reflexes.  UE/LE   2/5 strength throughout.  Nonfocal use extremities. Speech clear.     Testing tremor head/neck and UEs  PSYCH: Oriented x 3.  Pleasant calm, well kept.  Purposeful/directed conservation with intact short/long gross memory.     Assessment/Plan     1. Gait difficulty    2. Hypertension, unspecified type  "   3. Hyperlipidemia, unspecified hyperlipidemia type    4. Cardiac arrhythmia, unspecified cardiac arrhythmia type    5. Nocturnal hypoxemia-nocturnal oxygen    6. Controlled type 2 diabetes mellitus without complication, without long-term current use of insulin (CMS/[facility])    7. Osteoporosis, unspecified osteoporosis type, unspecified pathological fracture presence    8. Anemia, unspecified type    9. Low vitamin B12 level-IM    10. Tremor-essential/inderal        Rx: reviewed/changes:  Same; but ok to try adding B12 oral    LAB/Testing/Referrals: reviewed/orders:   Today:   No orders of the defined types were placed in this encounter.      Discussions:   Offering below  Body mass index is 21.47 kg/m².   Patient's Body mass index is 21.47 kg/m². BMI is within normal parameters. No follow-up required.  Non-smoker      Patient Instructions   Over the counter 1000 mcg B12 by mouth each day    Offered PT referral- here/or daughter area.      Offered to help with Prolia      Follow up: Return for lab during/or just before next apt;, Dr Paniagua-, 6 m;.  Future Appointments   Date Time Provider Department Center   1/7/2019 10:00 AM NURSE/MA [facility] [place] [facility] None   2/4/2019 10:00 AM NURSE[facility] [place] [facility] None   3/4/2019 10:00 AM NURSE[facility] [place] [facility] None   6/21/2019  9:15 AM Luciano Paniagua MD [facility] None

## 2018-12-12 NOTE — TELEPHONE ENCOUNTER
"Vm from dtr \"she agreed to do the prolia, but only if she could get it at [place]. Just let me when, I would like to schedule it when we have to be out\" order done and faxed to [facility] and called dtr to schedule at her time and agreeable   "

## 2019-01-01 ENCOUNTER — APPOINTMENT (OUTPATIENT)
Dept: GENERAL RADIOLOGY | Facility: HOSPITAL | Age: 84
End: 2019-01-01

## 2019-01-01 ENCOUNTER — DOCUMENTATION (OUTPATIENT)
Dept: FAMILY MEDICINE CLINIC | Facility: CLINIC | Age: 84
End: 2019-01-01

## 2019-01-01 ENCOUNTER — EPISODE CHANGES (OUTPATIENT)
Dept: CASE MANAGEMENT | Facility: OTHER | Age: 84
End: 2019-01-01

## 2019-01-01 ENCOUNTER — CLINICAL SUPPORT (OUTPATIENT)
Dept: FAMILY MEDICINE CLINIC | Facility: CLINIC | Age: 84
End: 2019-01-01

## 2019-01-01 ENCOUNTER — TELEPHONE (OUTPATIENT)
Dept: FAMILY MEDICINE CLINIC | Facility: CLINIC | Age: 84
End: 2019-01-01

## 2019-01-01 ENCOUNTER — PATIENT OUTREACH (OUTPATIENT)
Dept: CASE MANAGEMENT | Facility: OTHER | Age: 84
End: 2019-01-01

## 2019-01-01 ENCOUNTER — DOCUMENTATION (OUTPATIENT)
Dept: ORTHOPEDIC SURGERY | Facility: HOSPITAL | Age: 84
End: 2019-01-01

## 2019-01-01 ENCOUNTER — APPOINTMENT (OUTPATIENT)
Dept: CT IMAGING | Facility: HOSPITAL | Age: 84
End: 2019-01-01

## 2019-01-01 ENCOUNTER — HOSPITAL ENCOUNTER (EMERGENCY)
Facility: HOSPITAL | Age: 84
Discharge: HOME | End: 2019-03-07
Attending: EMERGENCY MEDICINE | Admitting: EMERGENCY MEDICINE

## 2019-01-01 ENCOUNTER — HOSPITAL ENCOUNTER (EMERGENCY)
Facility: HOSPITAL | Age: 84
Discharge: HOME | End: 2019-03-05
Admitting: EMERGENCY MEDICINE

## 2019-01-01 ENCOUNTER — OUTSIDE FACILITY SERVICE (OUTPATIENT)
Dept: FAMILY MEDICINE CLINIC | Facility: CLINIC | Age: 84
End: 2019-01-01

## 2019-01-01 VITALS
RESPIRATION RATE: 18 BRPM | HEIGHT: 63 IN | HEART RATE: 60 BPM | BODY MASS INDEX: 22.68 KG/M2 | SYSTOLIC BLOOD PRESSURE: 120 MMHG | DIASTOLIC BLOOD PRESSURE: 48 MMHG | TEMPERATURE: 97.6 F | WEIGHT: 128 LBS | OXYGEN SATURATION: 95 %

## 2019-01-01 VITALS
OXYGEN SATURATION: 94 % | SYSTOLIC BLOOD PRESSURE: 100 MMHG | BODY MASS INDEX: 25.75 KG/M2 | RESPIRATION RATE: 16 BRPM | WEIGHT: 145.3 LBS | HEIGHT: 63 IN | HEART RATE: 95 BPM | TEMPERATURE: 97.8 F | DIASTOLIC BLOOD PRESSURE: 41 MMHG

## 2019-01-01 DIAGNOSIS — S42.292A CLOSED FRACTURE OF HEAD OF LEFT HUMERUS, INITIAL ENCOUNTER: Primary | ICD-10-CM

## 2019-01-01 DIAGNOSIS — E53.8 LOW VITAMIN B12 LEVEL: ICD-10-CM

## 2019-01-01 DIAGNOSIS — N39.0 ACUTE UTI (URINARY TRACT INFECTION): ICD-10-CM

## 2019-01-01 DIAGNOSIS — K59.00 CONSTIPATION, UNSPECIFIED CONSTIPATION TYPE: ICD-10-CM

## 2019-01-01 DIAGNOSIS — S52.532A CLOSED COLLES' FRACTURE OF LEFT RADIUS, INITIAL ENCOUNTER: ICD-10-CM

## 2019-01-01 DIAGNOSIS — Z79.891 USE OF OPIATES FOR THERAPEUTIC PURPOSES: ICD-10-CM

## 2019-01-01 DIAGNOSIS — R41.0 CONFUSION: Primary | ICD-10-CM

## 2019-01-01 LAB
ALBUMIN SERPL-MCNC: 4 G/DL (ref 3.5–5)
ALBUMIN SERPL-MCNC: 4.2 G/DL (ref 3.5–5)
ALBUMIN/GLOB SERPL: 1.4 G/DL (ref 1.1–2.5)
ALBUMIN/GLOB SERPL: 1.5 G/DL (ref 1.1–2.5)
ALP SERPL-CCNC: 45 U/L (ref 24–120)
ALP SERPL-CCNC: 47 U/L (ref 24–120)
ALT SERPL W P-5'-P-CCNC: 28 U/L (ref 0–54)
ALT SERPL W P-5'-P-CCNC: 28 U/L (ref 0–54)
ANION GAP SERPL CALCULATED.3IONS-SCNC: 11 MMOL/L (ref 4–13)
ANION GAP SERPL CALCULATED.3IONS-SCNC: 7 MMOL/L (ref 4–13)
APTT PPP: 28.5 SECONDS (ref 24.1–34.8)
AST SERPL-CCNC: 26 U/L (ref 7–45)
AST SERPL-CCNC: 52 U/L (ref 7–45)
BACTERIA SPEC AEROBE CULT: ABNORMAL
BACTERIA UR QL AUTO: ABNORMAL /HPF
BASOPHILS # BLD AUTO: 0.04 10*3/MM3 (ref 0–0.2)
BASOPHILS # BLD AUTO: 0.05 10*3/MM3 (ref 0–0.2)
BASOPHILS NFR BLD AUTO: 0.3 % (ref 0–2)
BASOPHILS NFR BLD AUTO: 0.4 % (ref 0–2)
BILIRUB SERPL-MCNC: 0.7 MG/DL (ref 0.1–1)
BILIRUB SERPL-MCNC: 1.1 MG/DL (ref 0.1–1)
BILIRUB UR QL STRIP: ABNORMAL
BUN BLD-MCNC: 24 MG/DL (ref 5–21)
BUN BLD-MCNC: 34 MG/DL (ref 5–21)
BUN/CREAT SERPL: 38.6 (ref 7–25)
BUN/CREAT SERPL: 38.7 (ref 7–25)
CALCIUM SPEC-SCNC: 10.5 MG/DL (ref 8.4–10.4)
CALCIUM SPEC-SCNC: 9.4 MG/DL (ref 8.4–10.4)
CHLORIDE SERPL-SCNC: 101 MMOL/L (ref 98–110)
CHLORIDE SERPL-SCNC: 102 MMOL/L (ref 98–110)
CLARITY UR: ABNORMAL
CO2 SERPL-SCNC: 26 MMOL/L (ref 24–31)
CO2 SERPL-SCNC: 29 MMOL/L (ref 24–31)
COLOR UR: ABNORMAL
CREAT BLD-MCNC: 0.62 MG/DL (ref 0.5–1.4)
CREAT BLD-MCNC: 0.88 MG/DL (ref 0.5–1.4)
DEPRECATED RDW RBC AUTO: 44.9 FL (ref 40–54)
DEPRECATED RDW RBC AUTO: 45.9 FL (ref 40–54)
EOSINOPHIL # BLD AUTO: 0.02 10*3/MM3 (ref 0–0.7)
EOSINOPHIL # BLD AUTO: 0.18 10*3/MM3 (ref 0–0.7)
EOSINOPHIL NFR BLD AUTO: 0.1 % (ref 0–4)
EOSINOPHIL NFR BLD AUTO: 1.8 % (ref 0–4)
ERYTHROCYTE [DISTWIDTH] IN BLOOD BY AUTOMATED COUNT: 13.6 % (ref 12–15)
ERYTHROCYTE [DISTWIDTH] IN BLOOD BY AUTOMATED COUNT: 13.7 % (ref 12–15)
GFR SERPL CREATININE-BSD FRML MDRD: 60 ML/MIN/1.73
GFR SERPL CREATININE-BSD FRML MDRD: 90 ML/MIN/1.73
GLOBULIN UR ELPH-MCNC: 2.7 GM/DL
GLOBULIN UR ELPH-MCNC: 3 GM/DL
GLUCOSE BLD-MCNC: 282 MG/DL (ref 70–100)
GLUCOSE BLD-MCNC: 285 MG/DL (ref 70–100)
GLUCOSE UR STRIP-MCNC: ABNORMAL MG/DL
HCT VFR BLD AUTO: 40.2 % (ref 37–47)
HCT VFR BLD AUTO: 40.6 % (ref 37–47)
HGB BLD-MCNC: 12.8 G/DL (ref 12–16)
HGB BLD-MCNC: 13 G/DL (ref 12–16)
HGB UR QL STRIP.AUTO: NEGATIVE
HYALINE CASTS UR QL AUTO: ABNORMAL /LPF
IMM GRANULOCYTES # BLD AUTO: 0.05 10*3/MM3 (ref 0–0.05)
IMM GRANULOCYTES # BLD AUTO: 0.09 10*3/MM3 (ref 0–0.05)
IMM GRANULOCYTES NFR BLD AUTO: 0.5 % (ref 0–5)
IMM GRANULOCYTES NFR BLD AUTO: 0.6 % (ref 0–5)
INR PPP: 1.05 (ref 0.91–1.09)
KETONES UR QL STRIP: ABNORMAL
LEUKOCYTE ESTERASE UR QL STRIP.AUTO: ABNORMAL
LYMPHOCYTES # BLD AUTO: 0.76 10*3/MM3 (ref 0.72–4.86)
LYMPHOCYTES # BLD AUTO: 0.92 10*3/MM3 (ref 0.72–4.86)
LYMPHOCYTES NFR BLD AUTO: 4.7 % (ref 15–45)
LYMPHOCYTES NFR BLD AUTO: 9.2 % (ref 15–45)
MCH RBC QN AUTO: 28.8 PG (ref 28–32)
MCH RBC QN AUTO: 29.3 PG (ref 28–32)
MCHC RBC AUTO-ENTMCNC: 31.8 G/DL (ref 33–36)
MCHC RBC AUTO-ENTMCNC: 32 G/DL (ref 33–36)
MCV RBC AUTO: 90 FL (ref 82–98)
MCV RBC AUTO: 92 FL (ref 82–98)
MONOCYTES # BLD AUTO: 0.65 10*3/MM3 (ref 0.19–1.3)
MONOCYTES # BLD AUTO: 0.93 10*3/MM3 (ref 0.19–1.3)
MONOCYTES NFR BLD AUTO: 5.8 % (ref 4–12)
MONOCYTES NFR BLD AUTO: 6.5 % (ref 4–12)
NEUTROPHILS # BLD AUTO: 14.23 10*3/MM3 (ref 1.87–8.4)
NEUTROPHILS # BLD AUTO: 8.21 10*3/MM3 (ref 1.87–8.4)
NEUTROPHILS NFR BLD AUTO: 81.6 % (ref 39–78)
NEUTROPHILS NFR BLD AUTO: 88.5 % (ref 39–78)
NITRITE UR QL STRIP: POSITIVE
NRBC BLD AUTO-RTO: 0 /100 WBC (ref 0–0)
NRBC BLD AUTO-RTO: 0 /100 WBC (ref 0–0)
PH UR STRIP.AUTO: <=5 [PH] (ref 5–8)
PLATELET # BLD AUTO: 259 10*3/MM3 (ref 130–400)
PLATELET # BLD AUTO: 277 10*3/MM3 (ref 130–400)
PMV BLD AUTO: 10 FL (ref 6–12)
PMV BLD AUTO: 10.7 FL (ref 6–12)
POTASSIUM BLD-SCNC: 4.3 MMOL/L (ref 3.5–5.3)
POTASSIUM BLD-SCNC: 5 MMOL/L (ref 3.5–5.3)
PROT SERPL-MCNC: 6.7 G/DL (ref 6.3–8.7)
PROT SERPL-MCNC: 7.2 G/DL (ref 6.3–8.7)
PROT UR QL STRIP: NEGATIVE
PROTHROMBIN TIME: 14 SECONDS (ref 11.9–14.6)
RBC # BLD AUTO: 4.37 10*6/MM3 (ref 4.2–5.4)
RBC # BLD AUTO: 4.51 10*6/MM3 (ref 4.2–5.4)
RBC # UR: ABNORMAL /HPF
REF LAB TEST METHOD: ABNORMAL
SODIUM BLD-SCNC: 138 MMOL/L (ref 135–145)
SODIUM BLD-SCNC: 138 MMOL/L (ref 135–145)
SP GR UR STRIP: 1.02 (ref 1–1.03)
SQUAMOUS #/AREA URNS HPF: ABNORMAL /HPF
UROBILINOGEN UR QL STRIP: ABNORMAL
WBC CLUMPS # UR AUTO: ABNORMAL /HPF
WBC NRBC COR # BLD: 10.05 10*3/MM3 (ref 4.8–10.8)
WBC NRBC COR # BLD: 16.08 10*3/MM3 (ref 4.8–10.8)
WBC UR QL AUTO: ABNORMAL /HPF

## 2019-01-01 PROCEDURE — 80053 COMPREHEN METABOLIC PANEL: CPT | Performed by: PHYSICIAN ASSISTANT

## 2019-01-01 PROCEDURE — 93005 ELECTROCARDIOGRAM TRACING: CPT | Performed by: EMERGENCY MEDICINE

## 2019-01-01 PROCEDURE — 73030 X-RAY EXAM OF SHOULDER: CPT

## 2019-01-01 PROCEDURE — 96374 THER/PROPH/DIAG INJ IV PUSH: CPT

## 2019-01-01 PROCEDURE — 74018 RADEX ABDOMEN 1 VIEW: CPT

## 2019-01-01 PROCEDURE — 73090 X-RAY EXAM OF FOREARM: CPT

## 2019-01-01 PROCEDURE — 73130 X-RAY EXAM OF HAND: CPT

## 2019-01-01 PROCEDURE — 71045 X-RAY EXAM CHEST 1 VIEW: CPT

## 2019-01-01 PROCEDURE — 85025 COMPLETE CBC W/AUTO DIFF WBC: CPT | Performed by: EMERGENCY MEDICINE

## 2019-01-01 PROCEDURE — 81001 URINALYSIS AUTO W/SCOPE: CPT | Performed by: EMERGENCY MEDICINE

## 2019-01-01 PROCEDURE — 87088 URINE BACTERIA CULTURE: CPT | Performed by: EMERGENCY MEDICINE

## 2019-01-01 PROCEDURE — 99315 NF DSCHRG MGMT 30 MIN/LESS: CPT | Performed by: FAMILY MEDICINE

## 2019-01-01 PROCEDURE — 93010 ELECTROCARDIOGRAM REPORT: CPT | Performed by: INTERNAL MEDICINE

## 2019-01-01 PROCEDURE — 96372 THER/PROPH/DIAG INJ SC/IM: CPT

## 2019-01-01 PROCEDURE — 87186 SC STD MICRODIL/AGAR DIL: CPT | Performed by: EMERGENCY MEDICINE

## 2019-01-01 PROCEDURE — 25010000002 MORPHINE PER 10 MG: Performed by: EMERGENCY MEDICINE

## 2019-01-01 PROCEDURE — 70450 CT HEAD/BRAIN W/O DYE: CPT

## 2019-01-01 PROCEDURE — 99284 EMERGENCY DEPT VISIT MOD MDM: CPT

## 2019-01-01 PROCEDURE — 73110 X-RAY EXAM OF WRIST: CPT

## 2019-01-01 PROCEDURE — 99283 EMERGENCY DEPT VISIT LOW MDM: CPT

## 2019-01-01 PROCEDURE — 99304 1ST NF CARE SF/LOW MDM 25: CPT | Performed by: FAMILY MEDICINE

## 2019-01-01 PROCEDURE — 96375 TX/PRO/DX INJ NEW DRUG ADDON: CPT

## 2019-01-01 PROCEDURE — 96372 THER/PROPH/DIAG INJ SC/IM: CPT | Performed by: FAMILY MEDICINE

## 2019-01-01 PROCEDURE — 85610 PROTHROMBIN TIME: CPT | Performed by: PHYSICIAN ASSISTANT

## 2019-01-01 PROCEDURE — 85025 COMPLETE CBC W/AUTO DIFF WBC: CPT | Performed by: PHYSICIAN ASSISTANT

## 2019-01-01 PROCEDURE — 25010000002 FENTANYL CITRATE (PF) 100 MCG/2ML SOLUTION: Performed by: EMERGENCY MEDICINE

## 2019-01-01 PROCEDURE — 87077 CULTURE AEROBIC IDENTIFY: CPT | Performed by: EMERGENCY MEDICINE

## 2019-01-01 PROCEDURE — 73070 X-RAY EXAM OF ELBOW: CPT

## 2019-01-01 PROCEDURE — 87086 URINE CULTURE/COLONY COUNT: CPT | Performed by: EMERGENCY MEDICINE

## 2019-01-01 PROCEDURE — 25010000002 ONDANSETRON PER 1 MG: Performed by: EMERGENCY MEDICINE

## 2019-01-01 PROCEDURE — 80053 COMPREHEN METABOLIC PANEL: CPT | Performed by: EMERGENCY MEDICINE

## 2019-01-01 PROCEDURE — 73060 X-RAY EXAM OF HUMERUS: CPT

## 2019-01-01 PROCEDURE — 99231 SBSQ HOSP IP/OBS SF/LOW 25: CPT | Performed by: FAMILY MEDICINE

## 2019-01-01 PROCEDURE — P9612 CATHETERIZE FOR URINE SPEC: HCPCS

## 2019-01-01 PROCEDURE — 99285 EMERGENCY DEPT VISIT HI MDM: CPT

## 2019-01-01 PROCEDURE — 85730 THROMBOPLASTIN TIME PARTIAL: CPT | Performed by: PHYSICIAN ASSISTANT

## 2019-01-01 RX ORDER — RANITIDINE 150 MG/1
TABLET ORAL
Qty: 180 TABLET | Refills: 1 | Status: SHIPPED | OUTPATIENT
Start: 2019-01-01

## 2019-01-01 RX ORDER — PROPRANOLOL HYDROCHLORIDE 40 MG/1
TABLET ORAL
Qty: 270 TABLET | Refills: 1 | Status: SHIPPED | OUTPATIENT
Start: 2019-01-01

## 2019-01-01 RX ORDER — LOSARTAN POTASSIUM 100 MG/1
TABLET ORAL
Qty: 90 TABLET | Refills: 1 | Status: SHIPPED | OUTPATIENT
Start: 2019-01-01

## 2019-01-01 RX ORDER — HYDROCODONE BITARTRATE AND ACETAMINOPHEN 7.5; 325 MG/1; MG/1
1 TABLET ORAL ONCE
Status: COMPLETED | OUTPATIENT
Start: 2019-01-01 | End: 2019-01-01

## 2019-01-01 RX ORDER — HYDROCODONE BITARTRATE AND ACETAMINOPHEN 7.5; 325 MG/1; MG/1
1 TABLET ORAL EVERY 8 HOURS PRN
Qty: 12 TABLET | Refills: 0 | Status: SHIPPED | OUTPATIENT
Start: 2019-01-01

## 2019-01-01 RX ORDER — DULOXETIN HYDROCHLORIDE 30 MG/1
CAPSULE, DELAYED RELEASE ORAL
Qty: 90 CAPSULE | Refills: 1 | Status: SHIPPED | OUTPATIENT
Start: 2019-01-01

## 2019-01-01 RX ORDER — POTASSIUM CHLORIDE 750 MG/1
CAPSULE, EXTENDED RELEASE ORAL
Qty: 180 CAPSULE | Refills: 1 | Status: SHIPPED | OUTPATIENT
Start: 2019-01-01

## 2019-01-01 RX ORDER — CIPROFLOXACIN 250 MG/1
250 TABLET, FILM COATED ORAL 2 TIMES DAILY
Qty: 10 TABLET | Refills: 0 | Status: SHIPPED | OUTPATIENT
Start: 2019-01-01

## 2019-01-01 RX ORDER — ONDANSETRON 2 MG/ML
4 INJECTION INTRAMUSCULAR; INTRAVENOUS ONCE
Status: COMPLETED | OUTPATIENT
Start: 2019-01-01 | End: 2019-01-01

## 2019-01-01 RX ORDER — GLIMEPIRIDE 4 MG/1
TABLET ORAL
Qty: 90 TABLET | Refills: 1 | Status: SHIPPED | OUTPATIENT
Start: 2019-01-01

## 2019-01-01 RX ORDER — AMLODIPINE BESYLATE 5 MG/1
TABLET ORAL
Qty: 180 TABLET | Refills: 1 | Status: SHIPPED | OUTPATIENT
Start: 2019-01-01

## 2019-01-01 RX ORDER — ONDANSETRON 4 MG/1
4 TABLET, ORALLY DISINTEGRATING ORAL ONCE
Status: COMPLETED | OUTPATIENT
Start: 2019-01-01 | End: 2019-01-01

## 2019-01-01 RX ORDER — SITAGLIPTIN 100 MG/1
TABLET, FILM COATED ORAL
Qty: 90 TABLET | Refills: 1 | Status: SHIPPED | OUTPATIENT
Start: 2019-01-01

## 2019-01-01 RX ORDER — FENTANYL CITRATE 50 UG/ML
25 INJECTION, SOLUTION INTRAMUSCULAR; INTRAVENOUS ONCE
Status: COMPLETED | OUTPATIENT
Start: 2019-01-01 | End: 2019-01-01

## 2019-01-01 RX ORDER — ONDANSETRON 4 MG/1
4 TABLET, FILM COATED ORAL EVERY 8 HOURS PRN
Qty: 12 TABLET | Refills: 0 | Status: SHIPPED | OUTPATIENT
Start: 2019-01-01

## 2019-01-01 RX ORDER — ATORVASTATIN CALCIUM 10 MG/1
TABLET, FILM COATED ORAL
Qty: 90 TABLET | Refills: 1 | Status: SHIPPED | OUTPATIENT
Start: 2019-01-01

## 2019-01-01 RX ORDER — METFORMIN HYDROCHLORIDE 500 MG/1
TABLET, EXTENDED RELEASE ORAL
Qty: 180 TABLET | Refills: 1 | Status: SHIPPED | OUTPATIENT
Start: 2019-01-01

## 2019-01-01 RX ADMIN — ONDANSETRON 4 MG: 2 INJECTION INTRAMUSCULAR; INTRAVENOUS at 17:04

## 2019-01-01 RX ADMIN — ONDANSETRON 4 MG: 4 TABLET, ORALLY DISINTEGRATING ORAL at 15:33

## 2019-01-01 RX ADMIN — MORPHINE SULFATE 4 MG: 4 INJECTION INTRAVENOUS at 17:04

## 2019-01-01 RX ADMIN — HYDROCODONE BITARTRATE AND ACETAMINOPHEN 1 TABLET: 7.5; 325 TABLET ORAL at 15:33

## 2019-01-01 RX ADMIN — CYANOCOBALAMIN 1000 MCG: 1000 INJECTION, SOLUTION INTRAMUSCULAR; SUBCUTANEOUS at 10:08

## 2019-01-01 RX ADMIN — CYANOCOBALAMIN 1000 MCG: 1000 INJECTION, SOLUTION INTRAMUSCULAR; SUBCUTANEOUS at 10:37

## 2019-01-01 RX ADMIN — FENTANYL CITRATE 25 MCG: 50 INJECTION, SOLUTION INTRAMUSCULAR; INTRAVENOUS at 10:50

## 2019-01-01 RX ADMIN — METHYLNALTREXONE BROMIDE 6 MG: 12 INJECTION, SOLUTION SUBCUTANEOUS at 10:10

## 2019-01-07 NOTE — PROGRESS NOTES
Pt here for B 12 injection as ordered. Given in right ventrogluteal. Pt tolerated well and no adverse reactions noted and pt left office.

## 2019-02-04 NOTE — PROGRESS NOTES
Pt here for B 12 injection as ordered. Given in LVG site . Pt tolerated well and no adverse reactions noted and pt left office.

## 2019-03-05 NOTE — ED PROVIDER NOTES
Subjective   History of Present Illness  89-year-old female presents with a chief complaint of left wrist, left shoulder left elbow pain.  Patient reports prior to arrival she was walking down her hallway without her cane and she had tripped over her feet and fell forward with her left hand outstretched.  Patient reports she had immediate pain has notable deformity to her wrist.  She has difficulty with range of motion with her left shoulder.  No numbness or tingling.  Review of Systems   All other systems reviewed and are negative.      Past Medical History:   Diagnosis Date   • Anemia    • CHF (congestive heart failure) (CMS/[facility])    • Diabetes (CMS/[facility])    • Hyperlipidemia    • Hypertension        Allergies   Allergen Reactions   • Penicillins Swelling and Rash       Past Surgical History:   Procedure Laterality Date   • FEMUR FRACTURE SURGERY         Family History   Problem Relation Age of Onset   • Diabetes Mother    • Parkinsonism Mother    • Bone cancer Father        Social History     Socioeconomic History   • Marital status:      Spouse name:    • Number of children: 2   • Years of education: 12   • Highest education level: Not on file   Occupational History   • Occupation: retired     Comment: telephone co/homemaker   Tobacco Use   • Smoking status: Never Smoker   • Smokeless tobacco: Never Used   Substance and Sexual Activity   • Alcohol use: No   • Drug use: No   • Sexual activity: Defer           Objective   Physical Exam   Constitutional: She is oriented to person, place, and time. She appears well-developed and well-nourished.   HENT:   Head: Normocephalic and atraumatic.   Eyes: EOM are normal. Pupils are equal, round, and reactive to light.   Neck: Normal range of motion.   Cardiovascular: Normal rate and regular rhythm.   Pulmonary/Chest: Effort normal and breath sounds normal.   Abdominal: Soft. Bowel sounds are normal.   Musculoskeletal: She exhibits edema, tenderness and deformity.    Tenderness palpation left shoulder, flexion extension at the elbow demonstrated, distal radius deformity, +2 radial pulse   Neurological: She is alert and oriented to person, place, and time. No cranial nerve deficit. Coordination normal.   Skin: Skin is warm. Capillary refill takes less than 2 seconds.   Psychiatric: She has a normal mood and affect. Her behavior is normal.   Nursing note and vitals reviewed.      Procedures           ED Course  ED Course as of Mar 05 2021   Tue Mar 05, 2019   1715 XR Wrist 3+ View Left [LM]   1716 XR Wrist 3+ View Left [LM]   1716 XR Wrist 3+ View Left [LM]   1716 XR Wrist 3+ View Left [LM]      ED Course User Index  [LM] Dat Clark PA-C        Labs Reviewed   COMPREHENSIVE METABOLIC PANEL - Abnormal; Notable for the following components:       Result Value    Glucose 285 (*)     BUN 24 (*)     BUN/Creatinine Ratio 38.7 (*)     All other components within normal limits    Narrative:     The MDRD GFR formula is only valid for adults with stable renal function between ages 18 and 70.   CBC WITH AUTO DIFFERENTIAL - Abnormal; Notable for the following components:    MCHC 32.0 (*)     Neutrophil % 81.6 (*)     Lymphocyte % 9.2 (*)     All other components within normal limits   PROTIME-INR - Normal   APTT - Normal   CBC AND DIFFERENTIAL    Narrative:     The following orders were created for panel order CBC & Differential.  Procedure                               Abnormality         Status                     ---------                               -----------         ------                     CBC Auto Differential[[id number]]        Abnormal            Final result                 Please view results for these tests on the individual orders.     XR Elbow 2 View Left   Final Result   1. No acute osseous abnormality.   This report was finalized on 03/05/2019 16:21 by Dr. Manuel Martinez MD.      XR Forearm 2 View Left   Final Result   1. Distal radial fracture   This report  was finalized on 03/05/2019 16:20 by Dr. Manuel Martinez MD.      XR Humerus Left   Final Result   Acute comminuted proximal left humerus fracture.   This report was finalized on 03/05/2019 16:20 by Dr Osmar Rene, .      XR Wrist 3+ View Left   Final Result   1. Distal radial/collie's fracture.   This report was finalized on 03/05/2019 16:19 by Dr. Manuel Martinez MD.      XR Shoulder 2+ View Left   Final Result   Acute, comminuted fracture of the surgical neck proximal left humerus.   Possible left rib fracture. Consider dedicated rib series.   This report was finalized on 03/05/2019 16:19 by Dr Osmar Rene, .      XR Hand 3+ View Left   Final Result   1. Colles' type fracture distal radius.   2. No additional fractures.   This report was finalized on 03/05/2019 16:19 by Dr. Manuel Martinez MD.      XR Chest 1 View   Final Result   Acute proximal left humerus fracture. No other acute findings.   This report was finalized on 03/05/2019 16:17 by Dr Osmar Rene, .                  MDM  Number of Diagnoses or Management Options  Diagnosis management comments: Dr. Eric Cuevas evaluated patient, they have chosen to splint extremity and sling shoulder, will follow up with March 18th        Amount and/or Complexity of Data Reviewed  Tests in the radiology section of CPT®: ordered and reviewed    Risk of Complications, Morbidity, and/or Mortality  Presenting problems: moderate  Diagnostic procedures: moderate  Management options: moderate    Patient Progress  Patient progress: stable        Final diagnoses:   Closed fracture of head of left humerus, initial encounter   Closed Colles' fracture of left radius, initial encounter            Dat Clark PA-C  03/05/19 2021

## 2019-03-07 NOTE — TELEPHONE ENCOUNTER
Daughter called and said Pt is still confused/ they went to [facility] ER and they sent her home. Daughter says they can't deal with this at home.     Dr. Paniagua talked to DR Gaines and she was told to take her to [facility] earlier before 7 and let them know what had been done today at [facility] and will probably get adm per Dr. Gaines

## 2019-03-07 NOTE — TELEPHONE ENCOUNTER
She said her mother fell on the 5th and fractured her left wrist and humerus.She said she was scheduled for a b12 injection here today, but will not be here. She says she is less responsive this morning and can't even get her out of the bed. She is calling an ambulance and taking her to the hospital.

## 2019-03-07 NOTE — ED PROVIDER NOTES
Subjective   Patient with a history of fall broke her forearm and her shoulder 2 days ago was given pain medications no complaint constipation generalized fatigue and weakness and confusion        Weakness - Generalized   Severity:  Moderate  Onset quality:  Gradual  Timing:  Constant  Progression:  Worsening  Chronicity:  New  Context: not alcohol use, not allergies, not change in medication, not dehydration, not drug use, not increased activity, not stress and not urinary tract infection    Relieved by:  Nothing  Worsened by:  Nothing  Ineffective treatments:  None tried  Associated symptoms: arthralgias    Associated symptoms: no abdominal pain, no anorexia, no ataxia, no chest pain, no cough, no difficulty walking, no dizziness, no drooling, no dysphagia, no falls, no fever, no frequency, no headaches, no hematochezia, no lethargy, no loss of consciousness, no near-syncope, no seizures, no shortness of breath, no stroke symptoms and no vision change    Risk factors: new medications    Risk factors: no anemia and no family hx of stroke    Pain   Associated symptoms: no abdominal pain, no chest pain, no cough, no fever, no headaches, no loss of consciousness and no shortness of breath        Review of Systems   Constitutional: Negative.  Negative for fever.   HENT: Negative.  Negative for drooling.    Eyes: Negative.    Respiratory: Negative.  Negative for cough and shortness of breath.    Cardiovascular: Negative.  Negative for chest pain and near-syncope.   Gastrointestinal: Negative.  Negative for abdominal pain, anorexia, dysphagia and hematochezia.   Genitourinary: Negative for frequency.   Musculoskeletal: Positive for arthralgias. Negative for back pain, falls and neck pain.   Skin: Negative.    Neurological: Negative for dizziness, seizures, loss of consciousness and headaches.   All other systems reviewed and are negative.      Past Medical History:   Diagnosis Date   • Anemia    • CHF (congestive heart  failure) (CMS/[facility], located within [facility])    • Diabetes (CMS/[facility]AC, located within [facility])    • Hyperlipidemia    • Hypertension        Allergies   Allergen Reactions   • Penicillins Swelling and Rash       Past Surgical History:   Procedure Laterality Date   • FEMUR FRACTURE SURGERY         Family History   Problem Relation Age of Onset   • Diabetes Mother    • Parkinsonism Mother    • Bone cancer Father        Social History     Socioeconomic History   • Marital status:      Spouse name:    • Number of children: 2   • Years of education: 12   • Highest education level: Not on file   Occupational History   • Occupation: retired     Comment: telephone co/homemaker   Tobacco Use   • Smoking status: Never Smoker   • Smokeless tobacco: Never Used   Substance and Sexual Activity   • Alcohol use: No   • Drug use: No   • Sexual activity: Defer           Objective   Physical Exam   Constitutional: She appears well-nourished. She appears lethargic. She has a sickly appearance.   HENT:   Head: Normocephalic and atraumatic.   Eyes: Conjunctivae and EOM are normal. Pupils are equal, round, and reactive to light.   Neck: Normal range of motion. Neck supple.   Cardiovascular: Normal rate, regular rhythm, normal heart sounds and intact distal pulses.   Pulmonary/Chest: Effort normal and breath sounds normal.   Abdominal: Soft. Bowel sounds are normal. There is no tenderness. There is no guarding.   Musculoskeletal: Normal range of motion.   Neurological: She has normal reflexes. She appears lethargic. She is disoriented. No cranial nerve deficit. GCS eye subscore is 4. GCS motor subscore is 6.   Skin: Skin is warm and dry.   Psychiatric: She has a normal mood and affect.   Nursing note and vitals reviewed.      Procedures           ED Course  ED Course as of Mar 07 1126   Thu Mar 07, 2019   1121 Admitted with confusion CT scans lab workup is negative urinalysis shows a UTI with a white cell count 16,000 there is no evidence of sepsis or systemic inflammatory  response anion gap is 11 with a normal bicarb the patient had a bowel movement over here in the ED and is feeling much better is more awake and alert at this time in no distress the family is agreeable to taking her home pain medicine along with UTI probably cause her to have and appeared confused the patient will be discharged home  [TS]      ED Course User Index  [TS] Hai Sanchez MD                  [facility]      Final diagnoses:   Confusion   Acute UTI (urinary tract infection)   Constipation, unspecified constipation type   Use of opiates for therapeutic purposes            Hai Sanchez MD  03/07/19 1124

## 2019-03-07 NOTE — ED NOTES
PT had a large BM. She states she cannot remember the last time she had a BM. BM is ongoing. PT has been cleaned up. Incontinence care preformed.      Sarah Rondon, RN  03/07/19 2650

## 2019-03-08 NOTE — OUTREACH NOTE
Care Management Plan 3/8/2019   Lifestyle Goals Decrease falls risk;Medication management;Routine follow-up with doctor(s)   Self Management Medication Adherence   Annual Wellness Visit:  Care Coordinator Will Schedule   Care Gaps Addressed Flu Shot   Does patient have depression diagnosis? Yes   Advanced Directives: Send Information   Ed Visits past 12 months: 2 or 3   Hospitalizations past 12 months None   Health Literacy Good     The main concerns and/or symptoms the patient would like to address are: None    Education/instruction provided by Care Coordinator: Spoke with daughter and she indicated the patient will go to Dr. Paniagua's office in the AM to get B12 injection and then to [place] to stay with the daughter. MsGuillermo Allan with bring patient back to appt with Dr. Cuevas. Educated caregiver on pain medication and she verbalized understanding. Caregiver agreeable for enrollment into CM program.    Follow Up Outreach Due: 1 week    Opal Alva RN

## 2019-03-08 NOTE — TELEPHONE ENCOUNTER
Spoke to Lucy [facility] for occult, and H-11.3 was 11.7 , 12.6  Hct -35.5 was 39.8, 36.9  was and has had couple more BM same color    Current H & H 11.6, 35.5

## 2019-03-08 NOTE — TELEPHONE ENCOUNTER
"Notified Lucy [facility], nurse advised \"I cant get her to swallow, she hasnt taken any of her medication\" advised to please call the stat CBC at 4p and agreeable  "

## 2019-03-08 NOTE — TELEPHONE ENCOUNTER
CUAUHTEMOC from [place] at [facility].  She was brought in last  Night.  Calling to report pt had large mucousy liquid maroon BM.  She has responded minorly with her name.  Lightly squeezed with her left hand and moved left leg.  She has been unable to arouse to give her oral meds.  CT was clear just showed age undetermined infarct.  Do you want to repeat?  Any other orders?

## 2019-03-08 NOTE — PROGRESS NOTES
Consult  [facility]      Kortney Henson (8/16/1927)  3/5/2019    Reason for Consult: Fall with left shoulder pain and left wrist pain  Requesting Physician: No ref. provider found      CHIEF COMPLAINT: Left shoulder pain and left wrist pain    History Obtained From: Patient and daughter    HISTORY OF PRESENT ILLNESS:                The patient is a 89 y.o. female who presents with above chief complaint.  Left shoulder pain and left wrist pain following a fall.  She presents to the emergency room with x-rays revealing a minimally displaced fracture of the surgical neck of the left proximal humerus as well as a displaced angulated extra-articular fracture of the left distal radius.  The orthopedic service was consulted for evaluation of these injuries      Past Medical History:    Past Medical History:   Diagnosis Date   • Anemia    • CHF (congestive heart failure) (CMS/HCC)    • Diabetes (CMS/HCC)    • Hyperlipidemia    • Hypertension        Past Surgical History:    Past Surgical History:   Procedure Laterality Date   • FEMUR FRACTURE SURGERY         Current Medications:     Current Outpatient Medications:   •  amLODIPine (NORVASC) 5 MG tablet, TAKE ONE TABLET TWICE DAILY GENERIC FOR NORVASC, Disp: 180 tablet, Rfl: 1  •  aspirin 81 MG EC tablet, Take 81 mg by mouth Daily., Disp: , Rfl:   •  atorvastatin (LIPITOR) 10 MG tablet, TAKE ONE TABLET AT BEDTIME GENERIC FOR LIPITOR, Disp: 90 tablet, Rfl: 1  •  Calcium Carb-Cholecalciferol (CALCIUM-VITAMIN D) 600-400 MG-UNIT tablet, Take 1 tablet by mouth 2 (Two) Times a Day., Disp: , Rfl:   •  ciprofloxacin (CIPRO) 250 MG tablet, Take 1 tablet by mouth 2 (Two) Times a Day., Disp: 10 tablet, Rfl: 0  •  docusate sodium (COLACE) 100 MG capsule, Take 100 mg by mouth 2 (Two) Times a Day As Needed for constipation., Disp: , Rfl:   •  DULoxetine (CYMBALTA) 30 MG capsule, TAKE 1 CAPSULE DAILY GENERIC FOR CYMBALTA, Disp: 90 capsule, Rfl: 1  •   furosemide (LASIX) 20 MG tablet, TAKE 1 TABLET DAILY GENERIC FOR LASIX, Disp: 90 tablet, Rfl: 3  •  glimepiride (AMARYL) 4 MG tablet, TAKE ONE TABLET EVERY MORNING GENERIC FOR AMARYL, Disp: 90 tablet, Rfl: 1  •  glucose blood (COOL BLOOD GLUCOSE TEST STRIPS) test strip, USE AS DIRECTED DAILY, Disp: 100 each, Rfl: 3  •  HYDROcodone-acetaminophen (NORCO) 7.5-325 MG per tablet, Take 1 tablet by mouth Every 8 (Eight) Hours As Needed for Moderate Pain ., Disp: 12 tablet, Rfl: 0  •  JANUVIA 100 MG tablet, TAKE ONE TABLET DAILY, Disp: 90 tablet, Rfl: 1  •  losartan (COZAAR) 100 MG tablet, TAKE ONE TABLET DAILY GENERIC FOR COZAAR, Disp: 90 tablet, Rfl: 1  •  metFORMIN ER (GLUCOPHAGE-XR) 500 MG 24 hr tablet, TAKE  2 TABLETS DAILY WITH EVENING MEAL GENERIC FOR GLUCOPHAGE ER( DOSE IN  REHAB CENTER), Disp: 180 tablet, Rfl: 1  •  Multiple Vitamins-Minerals (WOMENS MULTI VITAMIN & MINERAL) tablet, Take 1 tablet by mouth Daily., Disp: , Rfl:   •  ondansetron (ZOFRAN) 4 MG tablet, Take 1 tablet by mouth Every 8 (Eight) Hours As Needed for Nausea., Disp: 12 tablet, Rfl: 0  •  potassium chloride (MICRO-K) 10 MEQ CR capsule, TAKE 1 CAPSULE TWICE DAILY (WITH LASIX), Disp: 180 capsule, Rfl: 1  •  propranolol (INDERAL) 40 MG tablet, TAKE ONE TABLET THREE TIMES DAILY GENERIC FOR INDERAL, Disp: 270 tablet, Rfl: 1  •  raNITIdine (ZANTAC) 150 MG tablet, TAKE ONE TABLET TWICE DAILY GENERIC FOR ZANTAC, Disp: 180 tablet, Rfl: 1    Current Facility-Administered Medications:   •  cyanocobalamin injection 1,000 mcg, 1,000 mcg, Intramuscular, Q30 Days, Luciano Paniagua MD, 1,000 mcg at 02/04/19 1037    Allergies:  Penicillins    Social History:   Social History     Socioeconomic History   • Marital status:      Spouse name:    • Number of children: 2   • Years of education: 12   • Highest education level: Not on file   Occupational History   • Occupation: retired     Comment: telephone co/homemaker   Tobacco Use   • Smoking  status: Never Smoker   • Smokeless tobacco: Never Used   Substance and Sexual Activity   • Alcohol use: No   • Drug use: No   • Sexual activity: Defer       Family History:   Family History   Problem Relation Age of Onset   • Diabetes Mother    • Parkinsonism Mother    • Bone cancer Father        REVIEW OF SYSTEMS:    All systems were reviewed and negative except for: Pain about the left shoulder and the left wrist    PHYSICAL EXAM:        General Appearance:    Alert, cooperative, in no acute distress   Head:    Normocephalic, without obvious abnormality, atraumatic   Eyes:            Vision intact, EOM intact     Ears:    Ears appear intact with no abnormalities noted   Neck:   No adenopathy, supple, trachea midline, no thyromegaly   Back:     No kyphosis present, no scoliosis present, no skin lesions,      erythema or scars, no tenderness to palpation,   range of motion normal   Lungs:     Clear, respirations regular, even and unlabored    Heart:    Regular rhythm and normal rate, no edema   Chest Wall:    No abnormalities observed   Abdomen:     Normal bowel sounds, no masses, no organomegaly, soft        non-tender, non-distended, no guarding   Rectal:     Deferred   Extremities:  She has pain and swelling about the left shoulder but there is no deformity.  She has pain with a deformity of the left distal radius with the distal fracture fragment displaced dorsally   Pulses:   Pulses palpable and equal bilaterally   Skin:   No bleeding, bruising or rash   Lymph nodes:   No palpable adenopathy   Neurologic:   Cranial nerves 2 - 12 grossly intact, alert and oriented times 3.         DATA:    Lab Results (last 24 hours)     ** No results found for the last 24 hours. **          Radiology:   Imaging Results (last 7 days)     ** No results found for the last 168 hours. **          Imaging was brought up and reviewed and I agree with the radiology findings.    IMPRESSION/RECOMMENDATIONS:      * No active hospital  problems. *      Assessment: #1 minimally displaced fracture left proximal humerus #2 displaced angulated extra-articular fracture left distal radius    Plan: I long discussion with the patient and her daughter concerning treatment options.  They want to avoid surgery.  They understand that she will have a deformity of her wrist but it should heal and should be functional for her therefore going to place her in a well-padded splint on the wrist and a sling for her shoulder and will follow up with her in the office    Electronically signed by Eric Cuevas MD12:01 PM3/8/2019

## 2019-03-11 NOTE — PROGRESS NOTES
"[facility]  History/physicial    Patient ID: Kortney Henson  MRN: [id number]    Acct:  [id number]  Admit Date: 3.7.19  Date of service: 3.11.19    SOURCE: The source of this information is prior knowledge of the patient, review of her office records, her current chart, as well as discussion with daughter/Dr Gaines; all are considered reliable.      PATIENT PROFILE: The patient is a 89 y/o white  female resident of [facility]; she was SOB but still nodded/ was cooperative.      CHIEF COMPLAINT: \"she is doing badly\"     HISTORY OF PRESENT ILLNESS: she fell 3.6.19 and went to /ED with referral/ saw KATE ramirez with dx colles fracture and L humerus fracture.  Was treated conservatively.  She went back to the ED 3.7.19 with a dx UTI and was sent home.  Our office was notifed after the fact and the daughter was having trouble taking care of the situations.  We advised [facility].  There she was admitted and since has had CT finding colitis and UTI.  Since she has developed melena, delirum and associated difficulty swallowing, etc.  The family/POA has requested a conservative approach and slowly there seemed to be some improvement; this later today when she developed rhonchi and more lethargy.      Allergies   Allergen Reactions   • Penicillins Swelling and Rash       HOME MEDICATIONS:  Prior to Admission medications    Medication Sig Start Date End Date Taking? Authorizing Provider   amLODIPine (NORVASC) 5 MG tablet TAKE ONE TABLET TWICE DAILY GENERIC FOR NORVASC 1/14/19   Luciano Paniagua MD   atorvastatin (LIPITOR) 10 MG tablet TAKE ONE TABLET AT BEDTIME GENERIC FOR LIPITOR 1/14/19   Luciano Paniagua MD   Calcium Carb-Cholecalciferol (CALCIUM-VITAMIN D) 600-400 MG-UNIT tablet Take 1 tablet by mouth 2 (Two) Times a Day.    Provider, MD Darryl   ciprofloxacin (CIPRO) 250 MG tablet Take 1 tablet by mouth 2 (Two) Times a Day. 3/7/19   Hai Sanchez MD   docusate sodium (COLACE) 100 MG capsule " Take 100 mg by mouth 2 (Two) Times a Day As Needed for constipation.    Provider, MD Darryl   DULoxetine (CYMBALTA) 30 MG capsule TAKE 1 CAPSULE DAILY GENERIC FOR CYMBALTA 19   Luciano Paniagua MD   furosemide (LASIX) 20 MG tablet TAKE 1 TABLET DAILY GENERIC FOR LASIX 10/19/18   Luciano Paniagua MD   glimepiride (AMARYL) 4 MG tablet TAKE ONE TABLET EVERY MORNING GENERIC FOR AMARYL 19   Luciano Paniagua MD   glucose blood (COOL BLOOD GLUCOSE TEST STRIPS) test strip USE AS DIRECTED DAILY 18   Luciano Paniagua MD   HYDROcodone-acetaminophen (NORCO) 7.5-325 MG per tablet Take 1 tablet by mouth Every 8 (Eight) Hours As Needed for Moderate Pain . 3/5/19   Kyaw Osborn MD   JANUVIA 100 MG tablet TAKE ONE TABLET DAILY 19   Luciano Paniagua MD   losartan (COZAAR) 100 MG tablet TAKE ONE TABLET DAILY GENERIC FOR COZAAR 19   Luciano Paniagua MD   metFORMIN ER (GLUCOPHAGE-XR) 500 MG 24 hr tablet TAKE  2 TABLETS DAILY WITH EVENING MEAL GENERIC FOR GLUCOPHAGE ER( DOSE IN  REHAB CENTER) 19   Luciano Paniagua MD   Multiple Vitamins-Minerals (WOMENS MULTI VITAMIN & MINERAL) tablet Take 1 tablet by mouth Daily.    Provider, MD Darryl   ondansetron (ZOFRAN) 4 MG tablet Take 1 tablet by mouth Every 8 (Eight) Hours As Needed for Nausea. 3/5/19   Dat Clark PA-C   potassium chloride (MICRO-K) 10 MEQ CR capsule TAKE 1 CAPSULE TWICE DAILY (WITH LASIX) 19   Luciano Paniagua MD   propranolol (INDERAL) 40 MG tablet TAKE ONE TABLET THREE TIMES DAILY GENERIC FOR INDERAL 19   Luciano Paniagua MD   raNITIdine (ZANTAC) 150 MG tablet TAKE ONE TABLET TWICE DAILY GENERIC FOR ZANTAC 19   Luciano Paniagua MD       PAST HISTORY:  PROCEDURES:  [id number]     Colonoscopy/[facility]/B/?     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30  Children/2     PATIENT EDUCATION:     ADVISED:  Mammogram/8-12-08+  Bone density/8-12-08+  Colonoscopy/8-12-08+  Cologuard/3.12.15     OTHER:  last flu shot/10-27-15/[facility]     HOSPITAL ADMITS:   :      HOSPITAL NOTES: [facility]  2.20.16-2.24.16  L hip fracture  osteoporosis  DM-2/controlled  hypertension  hyperlipidemia  arrythmia-undefined  vitamin D deficiency  macular degeneration  glaucoma  anticoagulation-ASA for DM/xarelto for hip fx  (to [facility])     4.15.18-4.19.18  Shortness of breath  Respiratory failure-hypoxic-acute-oxygen supported (hx nocturnal hypoxemia)  Pneumonia-bilateral upper/community exposed/CT chest  Severe sepsis (RR 36, , lactic acid 2.4, WBC 12.58, neuto % 89%)-BC           neg/parameters improved  CHF-acute/chronic diastolic (proBNP 4250)  Pulmonary HTN on echo  Hyperglycemia 344-at admit;  Hypoglycemia-  Labile blood sugar  A fib-new  Cough-improved  Anticoagulation-DM2, now new a fib/ASA 81 (no coumadin with age/etc)-temporary DVT            prevention lovonex  Gait decline-acute/chronic  Hypopotassemia-diuretic affected    [facility]:   none     [facility]:   none    GENERAL:  Inactive/slower with limits, speed, stamina for age and results of this. Sleep is on/off. No fever now.  ENDO:  No syncope, near or diaphoretic sweaty spells to this point.   BS Ok here.  HEENT: No head injury or headache.  No vision change.   Same hearing loss.  Ears without pain/drainage.  No sore throat.  No significant nasal/sinus congestion/drainage. No epistaxis.  CHEST: No chest wall tenderness or mass.  Increased rhonchi, cough, wheeze, SOB today; no hemoptysis.  CV: No chest pain, palpitations,  "ankle edema.  GI: No heartburn, dysphagia.  No abdominal pain, diarrhea, constipation current rectal bleeding, or melena.  :  Voids without dysuria; some incontinence.   ORTHO: No painful/swollen joints but various on /off sore and especially fractures.   No sore neck or back.  No acute neck or back pain without recent injury.   NEURO: No dizziness, weakness of extremities.  No new numbness/paresthesias.   PSYCH:  Lethargic/ memory loss.  Mood calm; not anxious, depressed.     PHYSICAL EXAMINATION:  WT: 132#  HT: 65\"  P: 105 RR: 38  BP: 144/76  T 96.7  GENERAL:  Well nourished/developed in no distress but audible rhonchi  SKIN: Turgor ok without wound, rash, lesion.  HEENT: Normal cephalic without trauma.  Pupils equal round reactive to light. Extraocular motions full without nystagmus.     Oral cavity without growths, exudates, and moist.  Posterior pharynx without mass, obstruction, redness.  No thyromegaly, mass, tenderness, lymphadenopathy and supple.  CV: Regular rhythm.  No murmur, gallop, trace LE edema. Posterior pulses intact.  No carotid bruits.   CHEST: No chest wall tenderness or mass.   LUNGS: Symmetric motion with rhonchi to auscultation.   ABD: Soft, nontender without mass.   ORTHO: Symmetric extremities without swelling/point tenderness.  Full gross range of motion except that splinted and painful.     NEURO: CN 2-12 grossly intact.  Symmetric facies. 1/4 x bicep equal reflexes.  UE/LE   1/5 strength throughout.  Nonfocal use extremities. Speech very weak.     PSYCH: Disoriented x 2: says she knew me.  Calm.       ASSESSMENT/PROBLEM LIST:   89 y/o white female-advanced age  Allergy/intolerance: see above  Procedural history: see above  Family history: see above  [id number]  Menopause  Hypertension  Diabetes 2-controlled  Anticoagulated-ASA 81/DM2  Hyperlipidemia-statin treated  Cardiac arrhythmia-prior PVCs; now new a fib  Nocturnal hypoxemia-oxygen treated  Osteoporosis (hip fx)  Macular " degeneration  Glaucoma  Vision decline  Anemia-chronic on/off  Low B12-IM replaced  Tremor-chronic/essential  Urinary incontience  Gait decline-chronic  Recurrent wax impaction  Depression-chronic     REASON FOR ADMISSION:    Shortness of breath-at least today and is taking precedence over other issues  Rhonchi  Respiratory failure-likely  Lethargy  Colitis (CT findings, 1/3+ occult blood)  Hyperkalemia 5.51  UTI (pyuria 10-20, 4+ bacteria)  Multiple trauma  L humerus fracture  L colles fracture  ? L sacral wing fracture  Recent fall  Recurrent falls    PLANS:   Rx-reviewed; ordered as needed and will review daily/as needed.   Includes no  anticoagulation for DVT prevention due to melena   LAB-reviewed; ordered as needed and will review daily.  Particular:  Daily CBC, chemistry  Imaging-reviewed; ordered as needed and will review daily.  Particular:  None as family   Wants conservative approach  Consults none  Diet: none  Fluids: slow  Special issues: Lasix, duoneb approach; look to results  DC planning: will have to improve to see if able  Code status: DNR

## 2019-03-12 NOTE — TELEPHONE ENCOUNTER
She wanted to let Dr. Paniagua know that she may be on hospice when he comes tonight.They are waiting on the  Hospice  Also wanted to let us know that she has not been able to get breathing txs because her HR has been 140-160

## 2019-03-12 NOTE — PROGRESS NOTES
[facility]  PROGRESS NOTE       Patient ID: Kortney Henson  MRN: [id number]                       Acct:  [id number]  Admit Date: 3.7.19  Date of visit: 3.12.19  Date of service: 3.12.19    Patient Care Team:  TAINA Paniagua MD    Chief Complaint:  Doing badly    Subjective      HISTORY OF PRESENT ILLNESS: She fell 3.6.19 and went to /ED. Was referred to KATE ramirez with dx L colles fracture, L humerus fracture and rib fractures. She was treated conservatively by him.  She was given hycodone 7.5 mg and became very lethargic.  She went back to the ED 3.7.19 and was diagnosed with a UTI and was sent home. Our office was notifed after the fact and that the daughter was having trouble taking care of the situations.  We advised [facility] ER.  There she was admitted. She was diagnosed eventually with E coli UTI/sepsis and placed on broad spectrum antibiotics.  She then developed melena.  A CT showed colitis. With all of this she became progressively weaker and developed an inability to swallow.  The family/POA then requested a conservative approach.  With even such an approach she seemed slowly to overall show some improvement but then began to develop increasing sob and rhonchi.  Dr Gaines had covered the early part of this.  When I returned the family had resigned themselves to her not surviving and wanted her kept comfortable.  The first night we gave lasix/steroids/oxygen and bronchodilators to see if she would survive the night.     Interval History:   Continued with less but some chest congestion all night.  Not able to swallow.  Denies pain.     Allergy/Meds reviewed:     Review of Systems:   GENERAL:  Inactive simply lying in bed. Sleep is on/off. No fever now.  ENDO:  No syncope, near or diaphoretic sweaty spells.  BS Ok.  HEENT: No head injury or headache.  No vision change.  Same mild hearing loss.  Ears without pain/drainage.  No sore throat.  No significant nasal/sinus congestion/drainage. No  epistaxis.  CHEST: No chest wall tenderness or mass.  Increased occ cough, with rhonchi, wheeze.  With oxygen no SOB; no hemoptysis.  CV: No chest pain, palpitations, ankle edema.  GI:  No abdominal pain, diarrhea, constipation.  No rectal bleeding, or melena now. Not able to swallow/not eating.  :  Eli.   ORTHO: No painful/swollen joints but various on /off sore fractures as noted.  No sore neck or back.    NEURO: Moves all extremities.   PSYCH: No obvious anxious.     Objective     Vital Signs  T:  97.4  Pulse: 108  RR: 28  BP: 135/83    Lab Results:  Reviewed    Physical Exam:  GENERAL:  Well nourished/developed in no acute distress. Frail appearing.   SKIN: Turgor excellent, without wound, rash, lesion  HEENT: Normal cephalic without trauma.  Pupils equal round reactive to light. Extraocular motions full without nystagmus.     External canals nonobstructive nontender without reddness.  Oral cavity without growths, exudates, and moist.  Posterior pharynx without mass, obstruction, redness.  No thyromegaly, mass, tenderness, lymphadenopathy and supple.  CV: Regular rhythm.  No murmur, gallop, no edema. Posterior pulses intact.  No carotid bruits.   CHEST: No chest wall tenderness or mass.   LUNGS: Symmetric motion with wheeze/rhonchi to auscultation.   ABD: Soft, nontender without mass.   ORTHO: Symmetric extremities with sore L humerus, splinted L forearm.  Full gross range of motion.    NEURO: CN 2-12 grossly intact.  Symmetric facies.  UE/LE   0-2/5 strength throughout.  Nonfocal use extremities. Speech weak/slow.  PSYCH: Disoriented x date, place.  Pleasant calm, well kept.  Shallow but answered some questions; somewhat purposeful/directed conservation.    Results Review:    above    ASSESSMENT/PLAN:  Reason for admit/problems addressing acutely:  Shortness of breath-(improved with oxygen)  Rhonchi  Respiratory failure-hypoxic-  Lethargy  Colitis (CT findings, 1/3+ occult blood)  Hyperkalemia 5.51  UTI  (pyuria 10-20, 4+ bacteria)  Multiple trauma  L humerus fracture  L colles fracture  Rib fractures-bilateral  ? L sacral wing fracture  Recent fall  Recurrent falls    Chronic problems to review/consider during care:  89 y/o white female-advanced age  Allergy/intolerance: see above  Procedural history: see above  Family history: see above  [id number]  Menopause  Hypertension  Diabetes 2-controlled  Anticoagulated-ASA 81/DM2  Hyperlipidemia-statin treated  Cardiac arrhythmia-prior PVCs; now new a fib  Nocturnal hypoxemia-oxygen treated  Osteoporosis (hip fx)  Macular degeneration  Glaucoma  Vision decline  Anemia-chronic on/off  Low B12-IM replaced  Tremor-chronic/essential  Urinary incontience  Gait decline-chronic  Recurrent wax impaction  Depression-chronic    Preceding trauma, Rx sedation, ? Infection/sepsis appears to have lead to enough general health decline to result in dysphagia to result in an aspiration event.  Patient declines aggression.  Family wants hospice and does not want feeding tubes, life support or to put Ms Farfan through a prolonged uncertain convalescence.    Rx-reviewed, considered with changes as needed: no changes  Labs reviewed, considered and changes made as needed: no changes  Imaging reviewed, and need for considered: CXR pending  Consults needed: none  Diet reviewed, considered and changes made as needed: none/not able  Fluids reviewed, considered and changes made as needed: slow  Increase activity: not an option right now  Code status: DNR  Discussed possible/future discharge plans: family does not expect patient to    Survive. Hospice referral requested.   Case discussed with daughter, son-in-law, grandson; case management  Available to talk if needed with others- POA/caregiver and members care team.      Addendum: [facility] hospice accepted later in day; orders for hospice care given.   Luciano Paniagua MD

## 2019-03-14 NOTE — PROGRESS NOTES
[facility]  SWING BED/HOSPICE/PROGRESS FRANCOIS     Patient ID: Kortney Henson  MRN: [id number]                       Acct:  [id number]  Admit Date: 3.12.19  Date of visit: 3.13.19  Date of service: 3.13.19      Patient Care Team:  TAINA Paniagua MD    Chief Complaint:  She is dying.     Subjective  From acute care . to 3.12.19.  Impression:     Shortness of breath-at least today and is taking precedence over other issues  Rhonchi  Respiratory failure-likely  Lethargy  Colitis (CT findings, 1/3+ occult blood)  Hyperkalemia 5.51  UTI (pyuria 10-20, 4+ bacteria)  Multiple trauma  L humerus fracture  L colles fracture  ? L sacral wing fracture  Recent fall  Recurrent falls    Interval History: Using MS, ativan and sleeping all the time.  No response to family now.  40% mask/oxygen that family does not want changed.     Allergy/Meds reviewed:     Review of Systems:   GENERAL: No signs of pain  CHEST: Continued chest rattle on/off worse.   GI/: no bleeding.     Objective     Vital Signs  T:  100.2 Pulse: 150  RR: 19  BP: 116/65    Lab Results:  none      Physical Exam:  GENERAL: NAD/asleep.   CV: tachy; without edema.   CHEST: scattered crackle  NEURO: No response to noxious stimuli.  Pupils sluggish reactive.     Results Review:    none    ASSESSMENT/PLAN:  Reason for admit/problems addressing acutely:  Terminal respiratory and gi situations  dysphagia  Shortness of breath-at least today and is taking precedence over other issues  Respiratory failure-hypoxemia  Lethargy  Recent colitis (CT findings, /3+ occult blood)  Hyperkalemia 5.51  UTI (pyuria 10-20, 4+ bacteria)-likely sepsis  Multiple trauma  L humerus fracture  L colles fracture  ? L sacral wing fracture  Recent fall  Recurrent falls    Chronic problems to review/consider during care:  89 y/o white female-advanced age  Allergy/intolerance: see above  Procedural history: see above  Family history: see above  [id number]  Menopause  Hypertension  Diabetes  2-controlled  Anticoagulated-ASA 81/DM2  Hyperlipidemia-statin treated  Cardiac arrhythmia-prior PVCs; now new a fib  Nocturnal hypoxemia-oxygen treated  Osteoporosis (hip fx)  Macular degeneration  Glaucoma  Vision decline  Anemia-chronic on/off  Low B12-IM replaced  Tremor-chronic/essential  Urinary incontience  Gait decline-chronic  Recurrent wax impaction  Depression-chronic     Rx-reviewed, considered with changes as needed: increase MS/ativan as needed  Labs reviewed, considered and changes made as needed: none  Imaging reviewed, and need for considered: none  Consults needed: none  Diet reviewed, considered and changes made as needed: NPO  Fluids reviewed, considered and changes made as needed: none  Increase activity: impossible  Code status: DNR/dying  Discussed possible/future discharge plans: family advised in decline/terminal  Case discussed with daughter, son-in-law and grandson; nursing/case management and  nursing  Available to talk if needed with POA/caregiver and members care team.    Luciano Paniagua MD

## 2019-03-14 NOTE — PROGRESS NOTES
"[facility]  DISCHARGE SUMMARY     Patient ID: Kortney Henson  MRN: [id number]                       Acct:  [id number]  Admit Date: 3.7.19  Date of visit: 3.12.19  Date of service: 3.12.19      PATIENT PROFILE: The patient is a 89 y/o white  female resident of [place]; she was SOB but still nodded/ was cooperative.      CHIEF COMPLAINT: \"she is doing badly\"     HISTORY OF PRESENT ILLNESS: she fell 3.6.19 and went to /ED with referral/ saw KATE ramirez with dx colles fracture and L humerus fracture.  Was treated conservatively.  She went back to the ED 3.7.19 with a dx UTI and was sent home.  Our office was notifed after the fact and the daughter was having trouble taking care of the situations.  We advised [facility] ER.  There she was admitted and since has had CT finding colitis and UTI.  Since she has developed melena, delirum and associated difficulty swallowing, etc.  The family/POA has requested a conservative approach and slowly there seemed to be some improvement; this later today when she developed rhonchi and more lethargy.            Allergies   Allergen Reactions   • Penicillins Swelling and Rash         HOME MEDICATIONS:          Prior to Admission medications    Medication Sig Start Date End Date Taking? Authorizing Provider   amLODIPine (NORVASC) 5 MG tablet TAKE ONE TABLET TWICE DAILY GENERIC FOR NORVASC 1/14/19     Luciano Paniagua MD   atorvastatin (LIPITOR) 10 MG tablet TAKE ONE TABLET AT BEDTIME GENERIC FOR LIPITOR 1/14/19     Luciano Paniagua MD   Calcium Carb-Cholecalciferol (CALCIUM-VITAMIN D) 600-400 MG-UNIT tablet Take 1 tablet by mouth 2 (Two) Times a Day.       ProviderDarryl MD   ciprofloxacin (CIPRO) 250 MG tablet Take 1 tablet by mouth 2 (Two) Times a Day. 3/7/19     Hai Sanchez MD   docusate sodium (COLACE) 100 MG capsule Take 100 mg by mouth 2 (Two) Times a Day As Needed for constipation.       ProviderDarryl MD   DULoxetine (CYMBALTA) 30 MG " capsule TAKE 1 CAPSULE DAILY GENERIC FOR CYMBALTA 19     Luciano Paniagua MD   furosemide (LASIX) 20 MG tablet TAKE 1 TABLET DAILY GENERIC FOR LASIX 10/19/18     Luciano Paniagua MD   glimepiride (AMARYL) 4 MG tablet TAKE ONE TABLET EVERY MORNING GENERIC FOR AMARYL 19     Luciano Paniagua MD   glucose blood (COOL BLOOD GLUCOSE TEST STRIPS) test strip USE AS DIRECTED DAILY 18     Luciano Paniagua MD   HYDROcodone-acetaminophen (NORCO) 7.5-325 MG per tablet Take 1 tablet by mouth Every 8 (Eight) Hours As Needed for Moderate Pain . 3/5/19     Kyaw Osborn MD   JANUVIA 100 MG tablet TAKE ONE TABLET DAILY 19     Luciano Paniagua MD   losartan (COZAAR) 100 MG tablet TAKE ONE TABLET DAILY GENERIC FOR COZAAR 19     Luciano Paniagua MD   metFORMIN ER (GLUCOPHAGE-XR) 500 MG 24 hr tablet TAKE  2 TABLETS DAILY WITH EVENING MEAL GENERIC FOR GLUCOPHAGE ER( DOSE IN  REHAB CENTER) 19     Luciano Paniagua MD   Multiple Vitamins-Minerals (WOMENS MULTI VITAMIN & MINERAL) tablet Take 1 tablet by mouth Daily.       Provider, MD Darryl   ondansetron (ZOFRAN) 4 MG tablet Take 1 tablet by mouth Every 8 (Eight) Hours As Needed for Nausea. 3/5/19     Dat Clark PA-C   potassium chloride (MICRO-K) 10 MEQ CR capsule TAKE 1 CAPSULE TWICE DAILY (WITH LASIX) 19     Luciano Paniagua MD   propranolol (INDERAL) 40 MG tablet TAKE ONE TABLET THREE TIMES DAILY GENERIC FOR INDERAL 19     Luciano Paniagua MD   raNITIdine (ZANTAC) 150 MG tablet TAKE ONE TABLET TWICE DAILY GENERIC FOR ZANTAC 19     Luciano Paniagua MD         PAST HISTORY:  PROCEDURES:  [id number]     Colonoscopy/[facility]/B/?     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30  Children/2     PATIENT EDUCATION:     ADVISED:  Mammogram/8-12-08+  Bone density/8-12-08+  Colonoscopy/8-12-08+  Cologuard/3.12.15     OTHER:  last flu shot/10-27-15/[facility]     HOSPITAL ADMITS:   :      HOSPITAL NOTES: [facility]  2.20.16-2.24.16  L hip fracture  osteoporosis  DM-2/controlled  hypertension  hyperlipidemia  arrythmia-undefined  vitamin D deficiency  macular degeneration  glaucoma  anticoagulation-ASA for DM/xarelto for hip fx  (to [facility])      4.15.18-4.19.18  Shortness of breath  Respiratory failure-hypoxic-acute-oxygen supported (hx nocturnal hypoxemia)  Pneumonia-bilateral upper/community exposed/CT chest  Severe sepsis (RR 36, , lactic acid 2.4, WBC 12.58, neuto % 89%)-BC           neg/parameters improved  CHF-acute/chronic diastolic (proBNP 4250)  Pulmonary HTN on echo  Hyperglycemia 344-at admit;  Hypoglycemia-  Labile blood sugar  A fib-new  Cough-improved  Anticoagulation-DM2, now new a fib/ASA 81 (no coumadin with age/etc)-temporary DVT            prevention lovonex  Gait decline-acute/chronic  Hypopotassemia-diuretic affected     [facility]:   none     [facility]:   none     GENERAL:  Inactive/slower with limits, speed, stamina for age and results of this. Sleep is on/off. No fever now.  ENDO:  No syncope, near or diaphoretic sweaty spells to this point.   BS Ok here.  HEENT: No head injury or headache.  No vision change.   Same hearing loss.  Ears without pain/drainage.  No sore throat.  No significant nasal/sinus congestion/drainage. No epistaxis.  CHEST: No chest wall tenderness or mass.  Increased rhonchi, cough, wheeze, SOB today; no hemoptysis.  CV: No chest pain, palpitations,  "ankle edema.  GI: No heartburn, dysphagia.  No abdominal pain, diarrhea, constipation current rectal bleeding, or melena.  :  Voids without dysuria; some incontinence.   ORTHO: No painful/swollen joints but various on /off sore and especially fractures.   No sore neck or back.  No acute neck or back pain without recent injury.   NEURO: No dizziness, weakness of extremities.  No new numbness/paresthesias.   PSYCH:  Lethargic/ memory loss.  Mood calm; not anxious, depressed.      PHYSICAL EXAMINATION:  WT: 132#  HT: 65\"  P: 105 RR: 38  BP: 144/76  T 96.7  GENERAL:  Well nourished/developed in no distress but audible rhonchi  SKIN: Turgor ok without wound, rash, lesion.  HEENT: Normal cephalic without trauma.  Pupils equal round reactive to light. Extraocular motions full without nystagmus.     Oral cavity without growths, exudates, and moist.  Posterior pharynx without mass, obstruction, redness.  No thyromegaly, mass, tenderness, lymphadenopathy and supple.  CV: Regular rhythm.  No murmur, gallop, trace LE edema. Posterior pulses intact.  No carotid bruits.   CHEST: No chest wall tenderness or mass.   LUNGS: Symmetric motion with rhonchi to auscultation.   ABD: Soft, nontender without mass.   ORTHO: Symmetric extremities without swelling/point tenderness.  Full gross range of motion except that splinted and painful.     NEURO: CN 2-12 grossly intact.  Symmetric facies. 1/4 x bicep equal reflexes.  UE/LE   1/5 strength throughout.  Nonfocal use extremities. Speech very weak.     PSYCH: Disoriented x 2: says she knew me.  Calm.       ASSESSMENT/PROBLEM LIST:   89 y/o white female-advanced age  Allergy/intolerance: see above  Procedural history: see above  Family history: see above  [id number]  Menopause  Hypertension  Diabetes 2-controlled  Anticoagulated-ASA 81/DM2  Hyperlipidemia-statin treated  Cardiac arrhythmia-prior PVCs; now new a fib  Nocturnal hypoxemia-oxygen treated  Osteoporosis (hip fx)  Macular " degeneration  Glaucoma  Vision decline  Anemia-chronic on/off  Low B12-IM replaced  Tremor-chronic/essential  Urinary incontience  Gait decline-chronic  Recurrent wax impaction  Depression-chronic     REASON FOR ADMISSION:    Shortness of breath-at least today and is taking precedence over other issues  Rhonchi  Respiratory failure-likely  Lethargy  Colitis (CT findings, 1/3+ occult blood)  Hyperkalemia 5.51  UTI (pyuria 10-20, 4+ bacteria)  Multiple trauma  L humerus fracture  L colles fracture  ? L sacral wing fracture  Recent fall  Recurrent falls    HOSPITAL COARSE: Again she was treated with broad-spectrum antibiotics IV fluids nebulized bronchodilators and seemed to gradually show a little improvement.  She grew E. coli in her urine.  She was felt to be possibly septic.  Then she developed difficulty swallowing; shortly after that she developed fever and increasing chest congestion.  Chest x-rays did not show anything definite; but this looks like an aspiration event.  Also during his hospital stay she developed rectal bleeding manifest by melena; CT showed colitis.  With she and her family's decision to be very conservative even to the point of hospice; these wishes were honored and a consult to hospice orders was placed.  They accepted her and she was transitioned to the [facility] bed program/for hospice care.    Labs: White count ranged from 9-10 hemoglobin from 11-10.  The differential was benign-appearing.  Chemistry started with a GFR of 37 and improved with fluids; at one point her troponin was normal amylase lipase were 8647 and normal respectively.  Albumin and total protein were low reflecting some degree of malnutrition.  Her potassium started at 5.5 and range down to 3.4 some point was being replaced with the decision for hospice was made.    REASON FOR ADMISSION:    Shortness of breath-at least today and is taking precedence over other issues  Rhonchi  Respiratory failure-likely  Lethargy  Colitis (CT  findings, 1/3+ occult blood)  Hyperkalemia 5.51  UTI (pyuria 10-20, 4+ bacteria)  Multiple trauma  L humerus fracture  L colles fracture  ? L sacral wing fracture  Recent fall  Recurrent falls    PLANS:   Transferred to swing bed/with  consult/care.   MS, ativan, peeweepamine off the  routine/standing hospice orders.     Prognosis: terminal.     Goal: comfort.